# Patient Record
Sex: FEMALE | Race: WHITE | HISPANIC OR LATINO | URBAN - METROPOLITAN AREA
[De-identification: names, ages, dates, MRNs, and addresses within clinical notes are randomized per-mention and may not be internally consistent; named-entity substitution may affect disease eponyms.]

---

## 2017-01-22 ENCOUNTER — OUTPATIENT (OUTPATIENT)
Dept: OUTPATIENT SERVICES | Facility: HOSPITAL | Age: 57
LOS: 1 days | End: 2017-01-22
Payer: COMMERCIAL

## 2017-01-22 DIAGNOSIS — G47.33 OBSTRUCTIVE SLEEP APNEA (ADULT) (PEDIATRIC): ICD-10-CM

## 2017-01-22 PROCEDURE — 95811 POLYSOM 6/>YRS CPAP 4/> PARM: CPT | Mod: 26

## 2017-01-22 PROCEDURE — 95811 POLYSOM 6/>YRS CPAP 4/> PARM: CPT

## 2017-01-23 ENCOUNTER — APPOINTMENT (OUTPATIENT)
Dept: CARDIOLOGY | Facility: CLINIC | Age: 57
End: 2017-01-23

## 2017-07-03 ENCOUNTER — EMERGENCY (EMERGENCY)
Facility: HOSPITAL | Age: 57
LOS: 1 days | Discharge: HOME | End: 2017-07-03
Attending: EMERGENCY MEDICINE
Payer: COMMERCIAL

## 2017-07-03 VITALS
WEIGHT: 199.96 LBS | DIASTOLIC BLOOD PRESSURE: 92 MMHG | RESPIRATION RATE: 18 BRPM | OXYGEN SATURATION: 98 % | SYSTOLIC BLOOD PRESSURE: 142 MMHG | HEIGHT: 61 IN | HEART RATE: 83 BPM | TEMPERATURE: 98 F

## 2017-07-03 PROCEDURE — 29515 APPLICATION SHORT LEG SPLINT: CPT

## 2017-07-03 PROCEDURE — 99284 EMERGENCY DEPT VISIT MOD MDM: CPT | Mod: 25

## 2017-07-04 PROCEDURE — 73610 X-RAY EXAM OF ANKLE: CPT

## 2017-07-04 PROCEDURE — 99284 EMERGENCY DEPT VISIT MOD MDM: CPT | Mod: 25

## 2017-07-04 PROCEDURE — 93971 EXTREMITY STUDY: CPT

## 2017-07-04 PROCEDURE — 93971 EXTREMITY STUDY: CPT | Mod: 26,RT

## 2017-07-04 PROCEDURE — 73630 X-RAY EXAM OF FOOT: CPT | Mod: 26,RT

## 2017-07-04 PROCEDURE — 73630 X-RAY EXAM OF FOOT: CPT

## 2017-07-04 PROCEDURE — 73610 X-RAY EXAM OF ANKLE: CPT | Mod: 26,RT

## 2017-07-04 PROCEDURE — 29515 APPLICATION SHORT LEG SPLINT: CPT | Mod: RT

## 2017-07-04 RX ORDER — IBUPROFEN 200 MG
1 TABLET ORAL
Qty: 15 | Refills: 0
Start: 2017-07-04 | End: 2017-07-09

## 2017-07-04 RX ORDER — IBUPROFEN 200 MG
400 TABLET ORAL ONCE
Qty: 0 | Refills: 0 | Status: COMPLETED | OUTPATIENT
Start: 2017-07-04 | End: 2017-07-04

## 2017-07-04 RX ADMIN — Medication 400 MILLIGRAM(S): at 03:13

## 2017-07-04 NOTE — ED PROVIDER NOTE - MEDICAL DECISION MAKING DETAILS
pt is a 56yo female with right ankle pain and swelling without injury. will do x rays to r/o bony injury. will do US to r/o DVT. will give motrin for pain and re-evaluate.

## 2017-07-04 NOTE — ED PROCEDURE NOTE - CPROC ED POST PROC CARE GUIDE1
Keep the cast/splint/dressing clean and dry./Instructed patient/caregiver regarding signs and symptoms of infection./Elevate the injured extremity as instructed./Verbal/written post procedure instructions were given to patient/caregiver./Instructed patient/caregiver to follow-up with primary care physician.

## 2017-07-04 NOTE — ED PROVIDER NOTE - OBJECTIVE STATEMENT
pt is a 58yo female with PMhx of DM on metformin, HTN, sarcoidosis, psoriasis c/o R ankle pain x tonight. pt reports she woke up pt is a 56yo female with PMhx of DM on metformin, HTN, sarcoidosis, psoriasis c/o R ankle pain x tonight. pt reports she woke up tonight with right ankle pain. pt reports she took 200mg of motrin which provided minimal relief. pt reports she did not injury extremity. pt reports previous episode which was an ankle sprain. pt endorses car ride last week to [place]. ALLERGY: ASA BUT PT TAKES DAILY?

## 2017-07-04 NOTE — ED PROVIDER NOTE - PHYSICAL EXAMINATION
PV: + 2 pitting edema R ankle + 2 dp R LE. cap refill < 2 seconds. no calf pain. + varicosity lateral right calf.   MS: RIGHT ANKLE AND LATERAL FOOT TTP. FROM OF BL LE. SENSATION GROSSLY INTACT BL LE.

## 2017-07-04 NOTE — ED PROCEDURE NOTE - NS ED PERI VASCULAR NEG
no swelling/no paresthesia/no cyanosis of extremity/capillary refill time < 2 seconds/fingers/toes warm to touch

## 2017-07-04 NOTE — ED PROVIDER NOTE - ATTENDING CONTRIBUTION TO CARE
pt with ankle pain over lateral malleolus. pt has avulsion fx, plan splint, x-ray    I personally saw the patient with the PA, and completed the key components of the history and physical exam. I then discussed the management plan with the PA

## 2017-07-04 NOTE — ED PROVIDER NOTE - PROGRESS NOTE DETAILS
X RAY SHOWS LATERAL MALLEOLUS AVULSION FRACTURE, POSSIBLY OLD. WILL SPLINT AFTER US RESULTS. us reviewed. pt splinted and rx motrin since pt states she takes motrin. advised pt to follow up with ortho. pt has crutches with her. pt verbalized understanding and agreement with plan and dx will dc. X RAY SHOWS LATERAL MALLEOLUS AVULSION FRACTURE TO R LE, POSSIBLY OLD. WILL SPLINT AFTER US RESULTS.

## 2017-07-10 ENCOUNTER — APPOINTMENT (OUTPATIENT)
Dept: PULMONOLOGY | Facility: CLINIC | Age: 57
End: 2017-07-10

## 2017-07-24 ENCOUNTER — EMERGENCY (EMERGENCY)
Facility: HOSPITAL | Age: 57
LOS: 1 days | Discharge: HOME | End: 2017-07-24
Attending: EMERGENCY MEDICINE | Admitting: EMERGENCY MEDICINE
Payer: COMMERCIAL

## 2017-07-24 VITALS
SYSTOLIC BLOOD PRESSURE: 145 MMHG | HEART RATE: 85 BPM | HEIGHT: 61 IN | DIASTOLIC BLOOD PRESSURE: 88 MMHG | TEMPERATURE: 99 F | WEIGHT: 199.96 LBS | RESPIRATION RATE: 18 BRPM | OXYGEN SATURATION: 98 %

## 2017-07-24 PROCEDURE — 99284 EMERGENCY DEPT VISIT MOD MDM: CPT

## 2017-07-24 PROCEDURE — 73562 X-RAY EXAM OF KNEE 3: CPT

## 2017-07-24 PROCEDURE — 93971 EXTREMITY STUDY: CPT | Mod: 26,LT

## 2017-07-24 PROCEDURE — 93971 EXTREMITY STUDY: CPT

## 2017-07-24 RX ORDER — IBUPROFEN 200 MG
600 TABLET ORAL ONCE
Qty: 0 | Refills: 0 | Status: COMPLETED | OUTPATIENT
Start: 2017-07-24 | End: 2017-07-24

## 2017-07-24 RX ORDER — CYCLOBENZAPRINE HYDROCHLORIDE 10 MG/1
10 TABLET, FILM COATED ORAL ONCE
Qty: 0 | Refills: 0 | Status: COMPLETED | OUTPATIENT
Start: 2017-07-24 | End: 2017-07-24

## 2017-07-24 RX ADMIN — Medication 600 MILLIGRAM(S): at 23:58

## 2017-07-24 RX ADMIN — CYCLOBENZAPRINE HYDROCHLORIDE 10 MILLIGRAM(S): 10 TABLET, FILM COATED ORAL at 23:58

## 2017-07-24 NOTE — ED ADULT NURSE NOTE - OBJECTIVE STATEMENT
Assumed pt care @ 7715. Pt received sitting on stretcher in NAD. Pt AOx3 C/O 10/10 left knee pain no trauma or injury to knee deon some swelling. Pt presents with a cast to the right ankle due to a  fx from previous injury. Pt ambulates with crutches due to that injury tonight too much and pt in wheelchair at this time.  Neuro WNL. Skin warm, dry, color appropriate for age and race.

## 2017-07-25 PROCEDURE — 73562 X-RAY EXAM OF KNEE 3: CPT | Mod: 26,LT

## 2017-07-25 RX ORDER — CYCLOBENZAPRINE HYDROCHLORIDE 10 MG/1
1 TABLET, FILM COATED ORAL
Qty: 15 | Refills: 0
Start: 2017-07-25 | End: 2017-07-30

## 2017-07-25 NOTE — ED PROVIDER NOTE - PHYSICAL EXAMINATION
appears uncomfortable  mm moist  sitting comfortbale  lef leg and knee no swelling  no skin changes  distal sensory, motor rand capillary intact  neuro cn grossly inatct

## 2017-07-25 NOTE — ED PROVIDER NOTE - OBJECTIVE STATEMENT
57y old has a crutch and boot for right left, states since then has had gradual increasing pain to left knee, pain is sharp, achy, does not radiate, aggravtated with movement, did not take any thing for pain, no fall, no trauma, no skin rash

## 2017-08-04 ENCOUNTER — APPOINTMENT (OUTPATIENT)
Dept: PULMONOLOGY | Facility: CLINIC | Age: 57
End: 2017-08-04
Payer: MEDICAID

## 2017-08-04 VITALS
OXYGEN SATURATION: 98 % | BODY MASS INDEX: 36.4 KG/M2 | WEIGHT: 199 LBS | SYSTOLIC BLOOD PRESSURE: 110 MMHG | HEART RATE: 77 BPM | DIASTOLIC BLOOD PRESSURE: 70 MMHG

## 2017-08-04 PROCEDURE — 99214 OFFICE O/P EST MOD 30 MIN: CPT

## 2017-08-07 ENCOUNTER — APPOINTMENT (OUTPATIENT)
Dept: MAMMOGRAPHY | Facility: CLINIC | Age: 57
End: 2017-08-07

## 2017-08-29 ENCOUNTER — CHART COPY (OUTPATIENT)
Age: 57
End: 2017-08-29

## 2017-09-05 ENCOUNTER — CHART COPY (OUTPATIENT)
Age: 57
End: 2017-09-05

## 2017-09-07 ENCOUNTER — APPOINTMENT (OUTPATIENT)
Dept: PULMONOLOGY | Facility: CLINIC | Age: 57
End: 2017-09-07

## 2017-09-19 ENCOUNTER — CHART COPY (OUTPATIENT)
Age: 57
End: 2017-09-19

## 2017-09-26 ENCOUNTER — OUTPATIENT (OUTPATIENT)
Dept: OUTPATIENT SERVICES | Facility: HOSPITAL | Age: 57
LOS: 1 days | End: 2017-09-26
Payer: MEDICAID

## 2017-09-26 ENCOUNTER — APPOINTMENT (OUTPATIENT)
Dept: MAMMOGRAPHY | Facility: CLINIC | Age: 57
End: 2017-09-26
Payer: MEDICAID

## 2017-09-26 DIAGNOSIS — Z00.8 ENCOUNTER FOR OTHER GENERAL EXAMINATION: ICD-10-CM

## 2017-09-26 PROCEDURE — 77063 BREAST TOMOSYNTHESIS BI: CPT | Mod: 26

## 2017-09-26 PROCEDURE — G0202: CPT | Mod: 26

## 2017-09-26 PROCEDURE — 77067 SCR MAMMO BI INCL CAD: CPT

## 2017-09-26 PROCEDURE — 77063 BREAST TOMOSYNTHESIS BI: CPT

## 2017-09-29 ENCOUNTER — CHART COPY (OUTPATIENT)
Age: 57
End: 2017-09-29

## 2017-10-25 ENCOUNTER — APPOINTMENT (OUTPATIENT)
Dept: RHEUMATOLOGY | Facility: CLINIC | Age: 57
End: 2017-10-25
Payer: MEDICAID

## 2017-10-25 VITALS
DIASTOLIC BLOOD PRESSURE: 75 MMHG | OXYGEN SATURATION: 96 % | BODY MASS INDEX: 35.88 KG/M2 | HEIGHT: 62 IN | HEART RATE: 86 BPM | TEMPERATURE: 98 F | SYSTOLIC BLOOD PRESSURE: 108 MMHG | RESPIRATION RATE: 17 BRPM | WEIGHT: 195 LBS

## 2017-10-25 PROCEDURE — 99205 OFFICE O/P NEW HI 60 MIN: CPT | Mod: 25

## 2017-10-25 PROCEDURE — 36415 COLL VENOUS BLD VENIPUNCTURE: CPT

## 2017-11-03 ENCOUNTER — APPOINTMENT (OUTPATIENT)
Dept: PULMONOLOGY | Facility: CLINIC | Age: 57
End: 2017-11-03
Payer: MEDICAID

## 2017-11-03 VITALS — HEART RATE: 82 BPM | DIASTOLIC BLOOD PRESSURE: 80 MMHG | SYSTOLIC BLOOD PRESSURE: 120 MMHG | OXYGEN SATURATION: 98 %

## 2017-11-03 VITALS — WEIGHT: 188 LBS | BODY MASS INDEX: 34.39 KG/M2

## 2017-11-03 PROCEDURE — 99214 OFFICE O/P EST MOD 30 MIN: CPT | Mod: 25

## 2017-11-03 PROCEDURE — 94010 BREATHING CAPACITY TEST: CPT

## 2017-12-28 LAB
ALBUMIN SERPL ELPH-MCNC: 4.3 G/DL
ALP BLD-CCNC: 38 U/L
ALT SERPL-CCNC: 18 U/L
ANION GAP SERPL CALC-SCNC: 15 MMOL/L
AST SERPL-CCNC: 20 U/L
BASOPHILS # BLD AUTO: 0.02 K/UL
BASOPHILS NFR BLD AUTO: 0.6 %
BILIRUB SERPL-MCNC: 0.4 MG/DL
BUN SERPL-MCNC: 19 MG/DL
CALCIUM SERPL-MCNC: 10.3 MG/DL
CHLORIDE SERPL-SCNC: 99 MMOL/L
CO2 SERPL-SCNC: 27 MMOL/L
CREAT SERPL-MCNC: 0.84 MG/DL
CRP SERPL-MCNC: 1.9 MG/DL
EOSINOPHIL # BLD AUTO: 0.17 K/UL
EOSINOPHIL NFR BLD AUTO: 5.2 %
ERYTHROCYTE [SEDIMENTATION RATE] IN BLOOD BY WESTERGREN METHOD: 37 MM/HR
GLUCOSE SERPL-MCNC: 93 MG/DL
HCT VFR BLD CALC: 35.8 %
HGB BLD-MCNC: 11.6 G/DL
IMM GRANULOCYTES NFR BLD AUTO: 0 %
LYMPHOCYTES # BLD AUTO: 0.81 K/UL
LYMPHOCYTES NFR BLD AUTO: 24.8 %
MAN DIFF?: NORMAL
MCHC RBC-ENTMCNC: 26.4 PG
MCHC RBC-ENTMCNC: 32.4 GM/DL
MCV RBC AUTO: 81.4 FL
MONOCYTES # BLD AUTO: 0.38 K/UL
MONOCYTES NFR BLD AUTO: 11.7 %
NEUTROPHILS # BLD AUTO: 1.88 K/UL
NEUTROPHILS NFR BLD AUTO: 57.7 %
PLATELET # BLD AUTO: 225 K/UL
POTASSIUM SERPL-SCNC: 4.5 MMOL/L
PROT SERPL-MCNC: 8 G/DL
RBC # BLD: 4.4 M/UL
RBC # FLD: 15.8 %
SODIUM SERPL-SCNC: 141 MMOL/L
URATE SERPL-MCNC: 11 MG/DL
WBC # FLD AUTO: 3.26 K/UL

## 2018-05-07 ENCOUNTER — APPOINTMENT (OUTPATIENT)
Dept: PULMONOLOGY | Facility: CLINIC | Age: 58
End: 2018-05-07
Payer: MEDICAID

## 2018-05-07 VITALS
WEIGHT: 193 LBS | DIASTOLIC BLOOD PRESSURE: 72 MMHG | HEART RATE: 82 BPM | OXYGEN SATURATION: 96 % | SYSTOLIC BLOOD PRESSURE: 116 MMHG | BODY MASS INDEX: 35.51 KG/M2 | HEIGHT: 62 IN

## 2018-05-07 PROCEDURE — 99214 OFFICE O/P EST MOD 30 MIN: CPT

## 2018-05-16 ENCOUNTER — APPOINTMENT (OUTPATIENT)
Dept: RHEUMATOLOGY | Facility: CLINIC | Age: 58
End: 2018-05-16
Payer: MEDICAID

## 2018-05-16 VITALS
HEART RATE: 81 BPM | RESPIRATION RATE: 18 BRPM | HEIGHT: 62 IN | DIASTOLIC BLOOD PRESSURE: 80 MMHG | WEIGHT: 193 LBS | TEMPERATURE: 98.3 F | BODY MASS INDEX: 35.51 KG/M2 | SYSTOLIC BLOOD PRESSURE: 122 MMHG | OXYGEN SATURATION: 97 %

## 2018-05-16 PROCEDURE — 99214 OFFICE O/P EST MOD 30 MIN: CPT | Mod: 25

## 2018-05-16 PROCEDURE — 36415 COLL VENOUS BLD VENIPUNCTURE: CPT

## 2018-05-29 ENCOUNTER — APPOINTMENT (OUTPATIENT)
Dept: NEPHROLOGY | Facility: CLINIC | Age: 58
End: 2018-05-29
Payer: MEDICAID

## 2018-05-29 VITALS
HEIGHT: 62 IN | SYSTOLIC BLOOD PRESSURE: 130 MMHG | BODY MASS INDEX: 34.41 KG/M2 | WEIGHT: 187 LBS | DIASTOLIC BLOOD PRESSURE: 70 MMHG | HEART RATE: 95 BPM

## 2018-05-29 PROCEDURE — 99205 OFFICE O/P NEW HI 60 MIN: CPT | Mod: 25

## 2018-05-29 PROCEDURE — 36415 COLL VENOUS BLD VENIPUNCTURE: CPT

## 2018-05-30 LAB
ALBUMIN SERPL ELPH-MCNC: 4.5 G/DL
ANION GAP SERPL CALC-SCNC: 16 MMOL/L
APPEARANCE: CLEAR
BACTERIA: ABNORMAL
BILIRUBIN URINE: NEGATIVE
BLOOD URINE: NEGATIVE
BUN SERPL-MCNC: 24 MG/DL
CALCIUM OXALATE CRYSTALS: ABNORMAL
CALCIUM SERPL-MCNC: 11.3 MG/DL
CHLORIDE SERPL-SCNC: 100 MMOL/L
CO2 SERPL-SCNC: 27 MMOL/L
COLOR: YELLOW
CREAT SERPL-MCNC: 1.09 MG/DL
CREAT SPEC-SCNC: 80 MG/DL
CREAT SPEC-SCNC: 80 MG/DL
CREAT/PROT UR: 0.1 RATIO
GLUCOSE QUALITATIVE U: NEGATIVE MG/DL
GLUCOSE SERPL-MCNC: 87 MG/DL
HYALINE CASTS: 3 /LPF
KETONES URINE: NEGATIVE
LEUKOCYTE ESTERASE URINE: ABNORMAL
MICROALBUMIN 24H UR DL<=1MG/L-MCNC: 0.9 MG/DL
MICROALBUMIN/CREAT 24H UR-RTO: 11 MG/G
MICROSCOPIC-UA: NORMAL
NITRITE URINE: NEGATIVE
PH URINE: 5.5
PHOSPHATE SERPL-MCNC: 3.9 MG/DL
POTASSIUM SERPL-SCNC: 4.7 MMOL/L
PROT UR-MCNC: 10 MG/DL
PROTEIN URINE: NEGATIVE MG/DL
RED BLOOD CELLS URINE: 2 /HPF
SODIUM SERPL-SCNC: 143 MMOL/L
SPECIFIC GRAVITY URINE: 1.01
SQUAMOUS EPITHELIAL CELLS: 6 /HPF
UROBILINOGEN URINE: NEGATIVE MG/DL
WHITE BLOOD CELLS URINE: 4 /HPF

## 2018-05-31 LAB
BASOPHILS # BLD AUTO: 0.02 K/UL
BASOPHILS NFR BLD AUTO: 0.7 %
C3 SERPL-MCNC: 192 MG/DL
C4 SERPL-MCNC: 43 MG/DL
DSDNA AB SER-ACNC: <12 IU/ML
EOSINOPHIL # BLD AUTO: 0.14 K/UL
EOSINOPHIL NFR BLD AUTO: 4.8 %
ESTIMATED AVERAGE GLUCOSE: 126 MG/DL
HBA1C MFR BLD HPLC: 6 %
HCT VFR BLD CALC: 38.7 %
HGB BLD-MCNC: 12 G/DL
IMM GRANULOCYTES NFR BLD AUTO: 0 %
LYMPHOCYTES # BLD AUTO: 0.68 K/UL
LYMPHOCYTES NFR BLD AUTO: 23.4 %
MAN DIFF?: NORMAL
MCHC RBC-ENTMCNC: 26.2 PG
MCHC RBC-ENTMCNC: 31 GM/DL
MCV RBC AUTO: 84.5 FL
MONOCYTES # BLD AUTO: 0.32 K/UL
MONOCYTES NFR BLD AUTO: 11 %
NEUTROPHILS # BLD AUTO: 1.75 K/UL
NEUTROPHILS NFR BLD AUTO: 60.1 %
PLATELET # BLD AUTO: 257 K/UL
RBC # BLD: 4.58 M/UL
RBC # FLD: 15.8 %
WBC # FLD AUTO: 2.91 K/UL

## 2018-06-11 ENCOUNTER — FORM ENCOUNTER (OUTPATIENT)
Age: 58
End: 2018-06-11

## 2018-06-12 ENCOUNTER — OUTPATIENT (OUTPATIENT)
Dept: OUTPATIENT SERVICES | Facility: HOSPITAL | Age: 58
LOS: 1 days | End: 2018-06-12
Payer: MEDICAID

## 2018-06-12 ENCOUNTER — APPOINTMENT (OUTPATIENT)
Dept: ULTRASOUND IMAGING | Facility: CLINIC | Age: 58
End: 2018-06-12
Payer: MEDICAID

## 2018-06-12 DIAGNOSIS — N18.3 CHRONIC KIDNEY DISEASE, STAGE 3 (MODERATE): ICD-10-CM

## 2018-06-12 PROCEDURE — 76775 US EXAM ABDO BACK WALL LIM: CPT | Mod: 26

## 2018-06-12 PROCEDURE — 76775 US EXAM ABDO BACK WALL LIM: CPT

## 2018-06-20 ENCOUNTER — APPOINTMENT (OUTPATIENT)
Dept: RHEUMATOLOGY | Facility: CLINIC | Age: 58
End: 2018-06-20

## 2018-06-27 ENCOUNTER — APPOINTMENT (OUTPATIENT)
Dept: NEPHROLOGY | Facility: CLINIC | Age: 58
End: 2018-06-27
Payer: MEDICAID

## 2018-06-27 VITALS
HEIGHT: 62 IN | SYSTOLIC BLOOD PRESSURE: 160 MMHG | DIASTOLIC BLOOD PRESSURE: 90 MMHG | HEART RATE: 96 BPM | WEIGHT: 185.25 LBS | BODY MASS INDEX: 34.09 KG/M2

## 2018-06-27 VITALS — SYSTOLIC BLOOD PRESSURE: 132 MMHG | DIASTOLIC BLOOD PRESSURE: 80 MMHG

## 2018-06-27 PROCEDURE — 99215 OFFICE O/P EST HI 40 MIN: CPT

## 2018-07-24 ENCOUNTER — RESULT REVIEW (OUTPATIENT)
Age: 58
End: 2018-07-24

## 2018-07-24 LAB
24R-OH-CALCIDIOL SERPL-MCNC: 83.3 PG/ML
25(OH)D3 SERPL-MCNC: 11 NG/ML
ACE BLD-CCNC: 23 U/L
ALBUMIN SERPL ELPH-MCNC: 4.2 G/DL
ANION GAP SERPL CALC-SCNC: 16 MMOL/L
BUN SERPL-MCNC: 35 MG/DL
CA-I SERPL-SCNC: 1.49 MMOL/L
CALCIUM SERPL-MCNC: 11.4 MG/DL
CALCIUM SERPL-MCNC: 11.4 MG/DL
CHLORIDE SERPL-SCNC: 99 MMOL/L
CO2 SERPL-SCNC: 25 MMOL/L
CREAT SERPL-MCNC: 1.31 MG/DL
DEPRECATED KAPPA LC FREE/LAMBDA SER: 2.19 RATIO
GLUCOSE SERPL-MCNC: 88 MG/DL
KAPPA LC CSF-MCNC: 5.34 MG/DL
KAPPA LC SERPL-MCNC: 11.69 MG/DL
M PROTEIN SPEC IFE-MCNC: NORMAL
PARATHYROID HORMONE INTACT: 9 PG/ML
PHOSPHATE SERPL-MCNC: 3.9 MG/DL
POTASSIUM SERPL-SCNC: 4.9 MMOL/L
SODIUM SERPL-SCNC: 140 MMOL/L

## 2018-07-26 PROBLEM — I10 ESSENTIAL (PRIMARY) HYPERTENSION: Chronic | Status: ACTIVE | Noted: 2017-07-04

## 2018-07-26 PROBLEM — D86.9 SARCOIDOSIS, UNSPECIFIED: Chronic | Status: ACTIVE | Noted: 2017-07-04

## 2018-07-26 PROBLEM — E11.9 TYPE 2 DIABETES MELLITUS WITHOUT COMPLICATIONS: Chronic | Status: ACTIVE | Noted: 2017-07-04

## 2018-07-31 ENCOUNTER — APPOINTMENT (OUTPATIENT)
Dept: NEPHROLOGY | Facility: CLINIC | Age: 58
End: 2018-07-31
Payer: MEDICAID

## 2018-07-31 VITALS
BODY MASS INDEX: 32.39 KG/M2 | DIASTOLIC BLOOD PRESSURE: 70 MMHG | SYSTOLIC BLOOD PRESSURE: 118 MMHG | WEIGHT: 176 LBS | HEIGHT: 62 IN | HEART RATE: 78 BPM | OXYGEN SATURATION: 98 %

## 2018-07-31 PROCEDURE — 36415 COLL VENOUS BLD VENIPUNCTURE: CPT

## 2018-07-31 PROCEDURE — 99215 OFFICE O/P EST HI 40 MIN: CPT | Mod: 25

## 2018-08-02 LAB
24R-OH-CALCIDIOL SERPL-MCNC: 81.4 PG/ML
25(OH)D3 SERPL-MCNC: 11 NG/ML
ALBUMIN SERPL ELPH-MCNC: 4.3 G/DL
ANION GAP SERPL CALC-SCNC: 18 MMOL/L
BUN SERPL-MCNC: 35 MG/DL
CALCIUM SERPL-MCNC: 11.7 MG/DL
CALCIUM SERPL-MCNC: 11.7 MG/DL
CHLORIDE SERPL-SCNC: 99 MMOL/L
CO2 SERPL-SCNC: 22 MMOL/L
CREAT SERPL-MCNC: 1.25 MG/DL
GLUCOSE SERPL-MCNC: 84 MG/DL
PARATHYROID HORMONE INTACT: 8 PG/ML
PHOSPHATE SERPL-MCNC: 3.6 MG/DL
POTASSIUM SERPL-SCNC: 4.8 MMOL/L
SODIUM SERPL-SCNC: 140 MMOL/L

## 2018-08-07 LAB — PTH RELATED PROT SERPL-MCNC: <2 PMOL/L

## 2018-08-28 ENCOUNTER — APPOINTMENT (OUTPATIENT)
Dept: NEPHROLOGY | Facility: CLINIC | Age: 58
End: 2018-08-28
Payer: MEDICAID

## 2018-08-28 VITALS
HEIGHT: 62 IN | WEIGHT: 180 LBS | SYSTOLIC BLOOD PRESSURE: 128 MMHG | BODY MASS INDEX: 33.13 KG/M2 | HEART RATE: 72 BPM | DIASTOLIC BLOOD PRESSURE: 80 MMHG

## 2018-08-28 PROCEDURE — 99215 OFFICE O/P EST HI 40 MIN: CPT

## 2018-08-29 LAB
ALBUMIN SERPL ELPH-MCNC: 4.2 G/DL
ANION GAP SERPL CALC-SCNC: 14 MMOL/L
BUN SERPL-MCNC: 36 MG/DL
CA-I SERPL-SCNC: 1.34 MMOL/L
CALCIUM SERPL-MCNC: 10.2 MG/DL
CHLORIDE SERPL-SCNC: 100 MMOL/L
CO2 SERPL-SCNC: 25 MMOL/L
CREAT SERPL-MCNC: 1.12 MG/DL
GLUCOSE SERPL-MCNC: 78 MG/DL
PHOSPHATE SERPL-MCNC: 3.8 MG/DL
POTASSIUM SERPL-SCNC: 4.9 MMOL/L
SODIUM SERPL-SCNC: 139 MMOL/L

## 2018-09-01 ENCOUNTER — OUTPATIENT (OUTPATIENT)
Dept: OUTPATIENT SERVICES | Facility: HOSPITAL | Age: 58
LOS: 1 days | End: 2018-09-01
Payer: MEDICAID

## 2018-09-01 PROCEDURE — G9001: CPT

## 2018-09-14 ENCOUNTER — APPOINTMENT (OUTPATIENT)
Dept: ORTHOPEDIC SURGERY | Facility: CLINIC | Age: 58
End: 2018-09-14
Payer: MEDICAID

## 2018-09-14 VITALS
WEIGHT: 185 LBS | DIASTOLIC BLOOD PRESSURE: 78 MMHG | SYSTOLIC BLOOD PRESSURE: 125 MMHG | HEART RATE: 78 BPM | HEIGHT: 62 IN | BODY MASS INDEX: 34.04 KG/M2

## 2018-09-14 DIAGNOSIS — M79.671 PAIN IN RIGHT LEG: ICD-10-CM

## 2018-09-14 DIAGNOSIS — M25.561 PAIN IN RIGHT KNEE: ICD-10-CM

## 2018-09-14 DIAGNOSIS — M79.604 PAIN IN RIGHT LEG: ICD-10-CM

## 2018-09-14 DIAGNOSIS — M79.672 PAIN IN RIGHT LEG: ICD-10-CM

## 2018-09-14 DIAGNOSIS — M79.605 PAIN IN RIGHT LEG: ICD-10-CM

## 2018-09-14 PROCEDURE — 99214 OFFICE O/P EST MOD 30 MIN: CPT

## 2018-09-14 PROCEDURE — 73630 X-RAY EXAM OF FOOT: CPT | Mod: RT

## 2018-09-18 ENCOUNTER — APPOINTMENT (OUTPATIENT)
Dept: PULMONOLOGY | Facility: CLINIC | Age: 58
End: 2018-09-18
Payer: MEDICAID

## 2018-09-18 VITALS
HEART RATE: 90 BPM | SYSTOLIC BLOOD PRESSURE: 122 MMHG | RESPIRATION RATE: 16 BRPM | OXYGEN SATURATION: 96 % | WEIGHT: 181 LBS | DIASTOLIC BLOOD PRESSURE: 84 MMHG | BODY MASS INDEX: 33.11 KG/M2

## 2018-09-18 PROCEDURE — 85018 HEMOGLOBIN: CPT | Mod: QW

## 2018-09-18 PROCEDURE — 94727 GAS DIL/WSHOT DETER LNG VOL: CPT

## 2018-09-18 PROCEDURE — 94729 DIFFUSING CAPACITY: CPT

## 2018-09-18 PROCEDURE — 99214 OFFICE O/P EST MOD 30 MIN: CPT | Mod: 25

## 2018-09-18 PROCEDURE — 94010 BREATHING CAPACITY TEST: CPT

## 2018-09-18 RX ORDER — PREDNISONE 10 MG/1
10 TABLET ORAL
Qty: 90 | Refills: 0 | Status: DISCONTINUED | COMMUNITY
Start: 2018-08-02 | End: 2018-09-18

## 2018-09-24 ENCOUNTER — EMERGENCY (EMERGENCY)
Facility: HOSPITAL | Age: 58
LOS: 1 days | Discharge: HOME | End: 2018-09-24
Attending: EMERGENCY MEDICINE
Payer: COMMERCIAL

## 2018-09-24 VITALS
RESPIRATION RATE: 18 BRPM | HEART RATE: 97 BPM | DIASTOLIC BLOOD PRESSURE: 81 MMHG | OXYGEN SATURATION: 99 % | SYSTOLIC BLOOD PRESSURE: 143 MMHG | TEMPERATURE: 99 F

## 2018-09-24 VITALS — HEIGHT: 66 IN | WEIGHT: 179.9 LBS

## 2018-09-24 PROCEDURE — 99283 EMERGENCY DEPT VISIT LOW MDM: CPT

## 2018-09-24 PROCEDURE — 73562 X-RAY EXAM OF KNEE 3: CPT

## 2018-09-24 RX ORDER — ACETAMINOPHEN 500 MG
650 TABLET ORAL ONCE
Qty: 0 | Refills: 0 | Status: COMPLETED | OUTPATIENT
Start: 2018-09-24 | End: 2018-09-24

## 2018-09-24 RX ADMIN — Medication 60 MILLIGRAM(S): at 17:38

## 2018-09-24 RX ADMIN — Medication 650 MILLIGRAM(S): at 16:23

## 2018-09-24 NOTE — ED STATDOCS - OBJECTIVE STATEMENT
57 y/o with hx of DM, gout, arthritis presents to ED c/o left knee pain radiating to foot x 1 week, worsening last night. Pt went to orthopedic doctor before pain began and was told she may have neuropathy. Pt had similar issue in the past in right knee and she had fluid taken out of knee. She is using crutches due to knee pain. Pt denies trauma, falls, injury, increased walking. Denies fever. Pt has been taking Tylenol for pain, last dose was last night. She was advised by nephrologist to not take ibuprofen due to kidney issues.  Nephrologist: Dr. Cheng 57 y/o with hx of DM, HTN, gout, arthritis presents to ED c/o left knee pain radiating to foot x 1 week, worsening last night. Pt went to orthopedic doctor before pain began and was told she may have neuropathy. Pt had similar issue in the past in right knee and she had fluid taken out of knee. She is using crutches due to knee pain. Pt denies trauma, falls, injury, increased walking. Denies fever. Pt has been taking Tylenol for pain, last dose was last night. She was advised by nephrologist to not take ibuprofen due to kidney issues.  Nephrologist: Dr. Cheng

## 2018-09-24 NOTE — ED STATDOCS - PROGRESS NOTE DETAILS
knee exam- left - laterally tender and warm, no medial tenderness. xray neg, discussed with dr shana kelsey ortho consult, will treat with steroid and ortho fu. pt will return if fever increased swelling increased pain or erythema

## 2018-09-24 NOTE — ED ADULT NURSE NOTE - NSIMPLEMENTINTERV_GEN_ALL_ED
Implemented All Universal Safety Interventions:  [place] to call system. Call bell, personal items and telephone within reach. Instruct patient to call for assistance. Room bathroom lighting operational. Non-slip footwear when patient is off stretcher. Physically safe environment: no spills, clutter or unnecessary equipment. Stretcher in lowest position, wheels locked, appropriate side rails in place.

## 2018-09-24 NOTE — ED STATDOCS - MEDICAL DECISION MAKING DETAILS
Gout vs arthritis vs septic arthritis. Get x-ray, pain control Gout vs arthritis vs septic arthritis. Get x-ray, pain control, ortho consult

## 2018-09-24 NOTE — ED ADULT NURSE NOTE - OBJECTIVE STATEMENT
Assumed care at 1555.  A+OX4, c/o L knee pain radiating to foot.  Hx; of arthritis and gout.  ambulating with crutches.  L knee appears swollen and is warm to touch.  Denies any other complaints at this time

## 2018-09-24 NOTE — ED STATDOCS - ATTENDING CONTRIBUTION TO CARE
I, Dr. Mcadams, performed the initial face to face bedside interview with this patient regarding history of present illness, review of symptoms and relevant past medical, social and family history.  I completed an independent physical examination.  I was the initial provider who evaluated this patient. I have signed out the follow up of any pending tests (i.e. labs, radiological studies) to the ACP.  I have communicated the patient’s plan of care and disposition with the ACP.

## 2018-09-24 NOTE — ED STATDOCS - MUSCULOSKELETAL, MLM
warmth and tenderness to left knee compared to right, ROM is limited due to pain. tenderness to lateral aspect of left knee with swelling

## 2018-09-25 DIAGNOSIS — Z71.89 OTHER SPECIFIED COUNSELING: ICD-10-CM

## 2018-09-26 PROCEDURE — 73562 X-RAY EXAM OF KNEE 3: CPT | Mod: 26,LT

## 2018-10-02 ENCOUNTER — LABORATORY RESULT (OUTPATIENT)
Age: 58
End: 2018-10-02

## 2018-10-02 ENCOUNTER — APPOINTMENT (OUTPATIENT)
Dept: NEPHROLOGY | Facility: CLINIC | Age: 58
End: 2018-10-02
Payer: MEDICAID

## 2018-10-02 VITALS
WEIGHT: 182 LBS | HEART RATE: 77 BPM | SYSTOLIC BLOOD PRESSURE: 130 MMHG | OXYGEN SATURATION: 97 % | HEIGHT: 62 IN | DIASTOLIC BLOOD PRESSURE: 68 MMHG | BODY MASS INDEX: 33.49 KG/M2

## 2018-10-02 PROCEDURE — 99215 OFFICE O/P EST HI 40 MIN: CPT

## 2018-10-04 LAB
ALBUMIN SERPL ELPH-MCNC: 4.1 G/DL
ANION GAP SERPL CALC-SCNC: 13 MMOL/L
APPEARANCE: CLEAR
BACTERIA: NEGATIVE
BASOPHILS # BLD AUTO: 0.01 K/UL
BASOPHILS NFR BLD AUTO: 0.2 %
BILIRUBIN URINE: NEGATIVE
BLOOD URINE: NEGATIVE
BUN SERPL-MCNC: 32 MG/DL
CALCIUM SERPL-MCNC: 10.1 MG/DL
CHLORIDE SERPL-SCNC: 103 MMOL/L
CO2 SERPL-SCNC: 28 MMOL/L
COLOR: YELLOW
CREAT SERPL-MCNC: 1.1 MG/DL
CREAT SPEC-SCNC: 53 MG/DL
CREAT/PROT UR: 0.1 RATIO
DEPRECATED KAPPA LC FREE/LAMBDA SER: 1.4 RATIO
EOSINOPHIL # BLD AUTO: 0.17 K/UL
EOSINOPHIL NFR BLD AUTO: 3.4 %
GLUCOSE QUALITATIVE U: NEGATIVE MG/DL
GLUCOSE SERPL-MCNC: 85 MG/DL
HCT VFR BLD CALC: 36.1 %
HGB BLD-MCNC: 11.2 G/DL
HYALINE CASTS: 1 /LPF
IMM GRANULOCYTES NFR BLD AUTO: 2.2 %
KAPPA LC CSF-MCNC: 2.29 MG/DL
KAPPA LC SERPL-MCNC: 3.2 MG/DL
KETONES URINE: NEGATIVE
LEUKOCYTE ESTERASE URINE: NEGATIVE
LYMPHOCYTES # BLD AUTO: 0.97 K/UL
LYMPHOCYTES NFR BLD AUTO: 19.2 %
M PROTEIN SPEC IFE-MCNC: NORMAL
MAN DIFF?: NORMAL
MCHC RBC-ENTMCNC: 26.2 PG
MCHC RBC-ENTMCNC: 31 GM/DL
MCV RBC AUTO: 84.3 FL
MICROSCOPIC-UA: NORMAL
MONOCYTES # BLD AUTO: 0.47 K/UL
MONOCYTES NFR BLD AUTO: 9.3 %
NEUTROPHILS # BLD AUTO: 3.33 K/UL
NEUTROPHILS NFR BLD AUTO: 65.7 %
NITRITE URINE: NEGATIVE
PH URINE: 6.5
PHOSPHATE SERPL-MCNC: 3.5 MG/DL
PLATELET # BLD AUTO: 310 K/UL
POTASSIUM SERPL-SCNC: 4.7 MMOL/L
PROT UR-MCNC: 4 MG/DL
PROTEIN URINE: NEGATIVE MG/DL
RBC # BLD: 4.28 M/UL
RBC # FLD: 16.3 %
RED BLOOD CELLS URINE: 3 /HPF
SODIUM SERPL-SCNC: 144 MMOL/L
SPECIFIC GRAVITY URINE: 1.01
SQUAMOUS EPITHELIAL CELLS: 2 /HPF
UROBILINOGEN URINE: NEGATIVE MG/DL
WBC # FLD AUTO: 5.06 K/UL
WHITE BLOOD CELLS URINE: 2 /HPF

## 2018-10-16 ENCOUNTER — APPOINTMENT (OUTPATIENT)
Dept: ORTHOPEDIC SURGERY | Facility: CLINIC | Age: 58
End: 2018-10-16
Payer: MEDICAID

## 2018-10-16 ENCOUNTER — OUTPATIENT (OUTPATIENT)
Dept: OUTPATIENT SERVICES | Facility: HOSPITAL | Age: 58
LOS: 1 days | End: 2018-10-16
Payer: MEDICAID

## 2018-10-16 ENCOUNTER — APPOINTMENT (OUTPATIENT)
Dept: ULTRASOUND IMAGING | Facility: CLINIC | Age: 58
End: 2018-10-16
Payer: MEDICAID

## 2018-10-16 ENCOUNTER — APPOINTMENT (OUTPATIENT)
Dept: MAMMOGRAPHY | Facility: CLINIC | Age: 58
End: 2018-10-16
Payer: MEDICAID

## 2018-10-16 ENCOUNTER — OTHER (OUTPATIENT)
Age: 58
End: 2018-10-16

## 2018-10-16 VITALS
BODY MASS INDEX: 33.49 KG/M2 | SYSTOLIC BLOOD PRESSURE: 107 MMHG | HEIGHT: 62 IN | DIASTOLIC BLOOD PRESSURE: 70 MMHG | WEIGHT: 182 LBS | HEART RATE: 106 BPM

## 2018-10-16 DIAGNOSIS — Z00.00 ENCOUNTER FOR GENERAL ADULT MEDICAL EXAMINATION WITHOUT ABNORMAL FINDINGS: ICD-10-CM

## 2018-10-16 PROCEDURE — 20610 DRAIN/INJ JOINT/BURSA W/O US: CPT | Mod: LT

## 2018-10-16 PROCEDURE — G0279: CPT | Mod: 26

## 2018-10-16 PROCEDURE — 73564 X-RAY EXAM KNEE 4 OR MORE: CPT | Mod: LT

## 2018-10-16 PROCEDURE — G0279: CPT

## 2018-10-16 PROCEDURE — 77066 DX MAMMO INCL CAD BI: CPT

## 2018-10-16 PROCEDURE — 76641 ULTRASOUND BREAST COMPLETE: CPT | Mod: 26,50

## 2018-10-16 PROCEDURE — 99214 OFFICE O/P EST MOD 30 MIN: CPT | Mod: 25

## 2018-10-16 PROCEDURE — 76641 ULTRASOUND BREAST COMPLETE: CPT

## 2018-10-16 PROCEDURE — 77066 DX MAMMO INCL CAD BI: CPT | Mod: 26

## 2018-10-25 ENCOUNTER — APPOINTMENT (OUTPATIENT)
Dept: RHEUMATOLOGY | Facility: CLINIC | Age: 58
End: 2018-10-25
Payer: MEDICAID

## 2018-10-25 VITALS
OXYGEN SATURATION: 98 % | HEART RATE: 79 BPM | RESPIRATION RATE: 20 BRPM | SYSTOLIC BLOOD PRESSURE: 160 MMHG | DIASTOLIC BLOOD PRESSURE: 80 MMHG | TEMPERATURE: 97.7 F

## 2018-10-25 PROCEDURE — 36415 COLL VENOUS BLD VENIPUNCTURE: CPT

## 2018-10-25 PROCEDURE — 99215 OFFICE O/P EST HI 40 MIN: CPT | Mod: 25

## 2018-10-26 ENCOUNTER — RX CHANGE (OUTPATIENT)
Age: 58
End: 2018-10-26

## 2018-10-26 RX ORDER — FEBUXOSTAT 40 MG/1
40 TABLET ORAL DAILY
Qty: 30 | Refills: 3 | Status: DISCONTINUED | COMMUNITY
Start: 2018-10-25 | End: 2018-10-26

## 2018-11-23 ENCOUNTER — EMERGENCY (EMERGENCY)
Facility: HOSPITAL | Age: 58
LOS: 1 days | Discharge: HOME | End: 2018-11-23
Attending: EMERGENCY MEDICINE
Payer: COMMERCIAL

## 2018-11-23 VITALS
SYSTOLIC BLOOD PRESSURE: 120 MMHG | TEMPERATURE: 97 F | DIASTOLIC BLOOD PRESSURE: 74 MMHG | HEART RATE: 75 BPM | RESPIRATION RATE: 19 BRPM | OXYGEN SATURATION: 98 %

## 2018-11-23 VITALS
HEIGHT: 63 IN | HEART RATE: 98 BPM | OXYGEN SATURATION: 98 % | RESPIRATION RATE: 20 BRPM | TEMPERATURE: 98 F | DIASTOLIC BLOOD PRESSURE: 79 MMHG | WEIGHT: 184.97 LBS | SYSTOLIC BLOOD PRESSURE: 126 MMHG

## 2018-11-23 LAB
ALBUMIN SERPL ELPH-MCNC: 4 G/DL — SIGNIFICANT CHANGE UP (ref 3.3–5.2)
ALP SERPL-CCNC: 45 U/L — SIGNIFICANT CHANGE UP (ref 40–120)
ALT FLD-CCNC: 12 U/L — SIGNIFICANT CHANGE UP
ANION GAP SERPL CALC-SCNC: 15 MMOL/L — SIGNIFICANT CHANGE UP (ref 5–17)
APPEARANCE UR: CLEAR — SIGNIFICANT CHANGE UP
APTT BLD: 37.4 SEC — HIGH (ref 27.5–36.3)
AST SERPL-CCNC: 15 U/L — SIGNIFICANT CHANGE UP
BASOPHILS # BLD AUTO: 0 K/UL — SIGNIFICANT CHANGE UP (ref 0–0.2)
BASOPHILS NFR BLD AUTO: 0.3 % — SIGNIFICANT CHANGE UP (ref 0–2)
BILIRUB SERPL-MCNC: 0.5 MG/DL — SIGNIFICANT CHANGE UP (ref 0.4–2)
BILIRUB UR-MCNC: NEGATIVE — SIGNIFICANT CHANGE UP
BUN SERPL-MCNC: 24 MG/DL — HIGH (ref 8–20)
CALCIUM SERPL-MCNC: 10.3 MG/DL — HIGH (ref 8.6–10.2)
CHLORIDE SERPL-SCNC: 97 MMOL/L — LOW (ref 98–107)
CO2 SERPL-SCNC: 26 MMOL/L — SIGNIFICANT CHANGE UP (ref 22–29)
COD CRY URNS QL: ABNORMAL
COLOR SPEC: YELLOW — SIGNIFICANT CHANGE UP
CREAT SERPL-MCNC: 1.16 MG/DL — SIGNIFICANT CHANGE UP (ref 0.5–1.3)
DIFF PNL FLD: ABNORMAL
EOSINOPHIL # BLD AUTO: 0.1 K/UL — SIGNIFICANT CHANGE UP (ref 0–0.5)
EOSINOPHIL NFR BLD AUTO: 0.8 % — SIGNIFICANT CHANGE UP (ref 0–6)
EPI CELLS # UR: SIGNIFICANT CHANGE UP
GLUCOSE SERPL-MCNC: 214 MG/DL — HIGH (ref 70–115)
GLUCOSE UR QL: NEGATIVE MG/DL — SIGNIFICANT CHANGE UP
HCT VFR BLD CALC: 34.4 % — LOW (ref 37–47)
HGB BLD-MCNC: 10.7 G/DL — LOW (ref 12–16)
INR BLD: 1.07 RATIO — SIGNIFICANT CHANGE UP (ref 0.88–1.16)
KETONES UR-MCNC: NEGATIVE — SIGNIFICANT CHANGE UP
LEUKOCYTE ESTERASE UR-ACNC: ABNORMAL
LIDOCAIN IGE QN: 42 U/L — SIGNIFICANT CHANGE UP (ref 22–51)
LYMPHOCYTES # BLD AUTO: 0.6 K/UL — LOW (ref 1–4.8)
LYMPHOCYTES # BLD AUTO: 7.4 % — LOW (ref 20–55)
MAGNESIUM SERPL-MCNC: 1.6 MG/DL — SIGNIFICANT CHANGE UP (ref 1.6–2.6)
MCHC RBC-ENTMCNC: 25.9 PG — LOW (ref 27–31)
MCHC RBC-ENTMCNC: 31.1 G/DL — LOW (ref 32–36)
MCV RBC AUTO: 83.3 FL — SIGNIFICANT CHANGE UP (ref 81–99)
MONOCYTES # BLD AUTO: 0.6 K/UL — SIGNIFICANT CHANGE UP (ref 0–0.8)
MONOCYTES NFR BLD AUTO: 7.4 % — SIGNIFICANT CHANGE UP (ref 3–10)
NEUTROPHILS # BLD AUTO: 6.4 K/UL — SIGNIFICANT CHANGE UP (ref 1.8–8)
NEUTROPHILS NFR BLD AUTO: 83.7 % — HIGH (ref 37–73)
NITRITE UR-MCNC: NEGATIVE — SIGNIFICANT CHANGE UP
PH UR: 5 — SIGNIFICANT CHANGE UP (ref 5–8)
PLATELET # BLD AUTO: 197 K/UL — SIGNIFICANT CHANGE UP (ref 150–400)
POTASSIUM SERPL-MCNC: 4.9 MMOL/L — SIGNIFICANT CHANGE UP (ref 3.5–5.3)
POTASSIUM SERPL-SCNC: 4.9 MMOL/L — SIGNIFICANT CHANGE UP (ref 3.5–5.3)
PROT SERPL-MCNC: 8 G/DL — SIGNIFICANT CHANGE UP (ref 6.6–8.7)
PROT UR-MCNC: 15 MG/DL
PROTHROM AB SERPL-ACNC: 12.3 SEC — SIGNIFICANT CHANGE UP (ref 10–12.9)
RBC # BLD: 4.13 M/UL — LOW (ref 4.4–5.2)
RBC # FLD: 15.3 % — SIGNIFICANT CHANGE UP (ref 11–15.6)
RBC CASTS # UR COMP ASSIST: SIGNIFICANT CHANGE UP /HPF (ref 0–4)
SODIUM SERPL-SCNC: 138 MMOL/L — SIGNIFICANT CHANGE UP (ref 135–145)
SP GR SPEC: 1.02 — SIGNIFICANT CHANGE UP (ref 1.01–1.02)
TROPONIN T SERPL-MCNC: <0.01 NG/ML — SIGNIFICANT CHANGE UP (ref 0–0.06)
UROBILINOGEN FLD QL: NEGATIVE MG/DL — SIGNIFICANT CHANGE UP
WBC # BLD: 7.7 K/UL — SIGNIFICANT CHANGE UP (ref 4.8–10.8)
WBC # FLD AUTO: 7.7 K/UL — SIGNIFICANT CHANGE UP (ref 4.8–10.8)
WBC UR QL: ABNORMAL

## 2018-11-23 PROCEDURE — 81001 URINALYSIS AUTO W/SCOPE: CPT

## 2018-11-23 PROCEDURE — T1013: CPT

## 2018-11-23 PROCEDURE — 99284 EMERGENCY DEPT VISIT MOD MDM: CPT | Mod: 25

## 2018-11-23 PROCEDURE — 84484 ASSAY OF TROPONIN QUANT: CPT

## 2018-11-23 PROCEDURE — 85610 PROTHROMBIN TIME: CPT

## 2018-11-23 PROCEDURE — 83735 ASSAY OF MAGNESIUM: CPT

## 2018-11-23 PROCEDURE — 85027 COMPLETE CBC AUTOMATED: CPT

## 2018-11-23 PROCEDURE — 83690 ASSAY OF LIPASE: CPT

## 2018-11-23 PROCEDURE — 36415 COLL VENOUS BLD VENIPUNCTURE: CPT

## 2018-11-23 PROCEDURE — 85730 THROMBOPLASTIN TIME PARTIAL: CPT

## 2018-11-23 PROCEDURE — 87086 URINE CULTURE/COLONY COUNT: CPT

## 2018-11-23 PROCEDURE — 96375 TX/PRO/DX INJ NEW DRUG ADDON: CPT

## 2018-11-23 PROCEDURE — 80053 COMPREHEN METABOLIC PANEL: CPT

## 2018-11-23 PROCEDURE — 96365 THER/PROPH/DIAG IV INF INIT: CPT

## 2018-11-23 RX ORDER — METOCLOPRAMIDE HCL 10 MG
10 TABLET ORAL ONCE
Qty: 0 | Refills: 0 | Status: COMPLETED | OUTPATIENT
Start: 2018-11-23 | End: 2018-11-23

## 2018-11-23 RX ORDER — PANTOPRAZOLE SODIUM 20 MG/1
40 TABLET, DELAYED RELEASE ORAL ONCE
Qty: 0 | Refills: 0 | Status: COMPLETED | OUTPATIENT
Start: 2018-11-23 | End: 2018-11-23

## 2018-11-23 RX ORDER — SUCRALFATE 1 G
1 TABLET ORAL ONCE
Qty: 0 | Refills: 0 | Status: COMPLETED | OUTPATIENT
Start: 2018-11-23 | End: 2018-11-23

## 2018-11-23 RX ADMIN — Medication 1 GRAM(S): at 04:58

## 2018-11-23 RX ADMIN — PANTOPRAZOLE SODIUM 40 MILLIGRAM(S): 20 TABLET, DELAYED RELEASE ORAL at 03:46

## 2018-11-23 RX ADMIN — Medication 10 MILLIGRAM(S): at 04:20

## 2018-11-23 RX ADMIN — Medication 104 MILLIGRAM(S): at 03:47

## 2018-11-23 RX ADMIN — Medication 60 MILLIGRAM(S): at 04:57

## 2018-11-23 NOTE — ED PROVIDER NOTE - CARE PLAN
Principal Discharge DX:	GERD (gastroesophageal reflux disease)  Secondary Diagnosis:	Chronic knee pain

## 2018-11-23 NOTE — ED PROVIDER NOTE - NS ED ROS FT
no weight change, no fever or chills  no recent travel, no recent abox, no sick contacts  no recent change in medications  has diffuse rash, no bruises  no visual changes no eye discharge  no cough cold or congestion,   no sob, no chest pain  no orthopnea, no pnd  has abd pain, has n/v, -d  no hematuria, no change in urinary habits  has joint pain, artheritis related deformity  no headache, no paresthesia

## 2018-11-23 NOTE — ED ADULT NURSE NOTE - CHIEF COMPLAINT QUOTE
c/o vomiting, started on new medication [id number] today for her gout and after that she started to  vomit

## 2018-11-23 NOTE — ED ADULT NURSE NOTE - NSIMPLEMENTINTERV_GEN_ALL_ED
Implemented All Fall Risk Interventions:  [place] to call system. Call bell, personal items and telephone within reach. Instruct patient to call for assistance. Room bathroom lighting operational. Non-slip footwear when patient is off stretcher. Physically safe environment: no spills, clutter or unnecessary equipment. Stretcher in lowest position, wheels locked, appropriate side rails in place. Provide visual cue, wrist band, yellow gown, etc. Monitor gait and stability. Monitor for mental status changes and reorient to person, place, and time. Review medications for side effects contributing to fall risk. Reinforce activity limits and safety measures with patient and family.

## 2018-11-23 NOTE — ED ADULT NURSE NOTE - OBJECTIVE STATEMENT
Received patient sitting in bed, a&ox3, able to make needs known. Noted with crutches at bedside as stated " I can't walk well because of my gout". Patient states she started vomiting yesterday morning 50 minutes after taking duloxetine. Patient complains of + nausea, (-) vomiting at this time, complains of abdominal pain. Patient denies chest pain, sob, dizziness, headache and fever. Patient not in distress. To continue to monitor.

## 2018-11-23 NOTE — ED PROVIDER NOTE - OBJECTIVE STATEMENT
History via translation  Presents to emergency room with complaints ofEpigastric pain radiating to the back. Started 4 hoursAfter eating a heavy meal.Pain has been gradual increasing constantNo aggravating factors no relieving factors no previous history of similar complaints. Patient did not take any pain medications.Is passing gas feels abdomen is distended no complaints of chest painNo complains of fever or chills no recent travel. Patient was recently started on a new medication she believes this could be side effect of the medicationShe hasComplaints of psoriasis and chronicKnee pain secondary to Psoriasis arthritis was seen by primary care physician

## 2018-11-23 NOTE — ED PROVIDER NOTE - MEDICAL DECISION MAKING DETAILS
51-year-old female otherwise in good state of health with recent change in medications presents to emergency room with complaints of epigastric painPlan to check labsUltrasound of gallbladder and outpatient follow-up

## 2018-11-23 NOTE — ED ADULT TRIAGE NOTE - CHIEF COMPLAINT QUOTE
c/o vomiting, started on new medication [id number] today for her gout and after that she started to  vomit

## 2018-11-23 NOTE — ED PROVIDER NOTE - PHYSICAL EXAMINATION
Constitutional : Appears uncomfortable, talking in full sentences  Head :NC AT , no swelling  Eyes :eomi, no swelling  Mouth :mm moist,  Neck : supple, trachea in midline  Chest :Og air entry, symm chest expansion, no distress  Heart :S1 S2 distant  Abdomen :abd soft, non tender, obese  Musc/Skel :ext no swelling, no deformity, knee swelling noted, no erythema, not warm  no spine tenderness, distal pulses present  rash dry scalt over extensor surface of body  Neuro  :AAO 3 no focal deficits

## 2018-11-23 NOTE — ED PROVIDER NOTE - PROGRESS NOTE DETAILS
Laboratory results reviewed patient feeling better  Results reviewed with patient explainedDifferential diagnoses  Outpatient follow-up with gastroenterologist advised

## 2018-11-25 LAB
CULTURE RESULTS: SIGNIFICANT CHANGE UP
SPECIMEN SOURCE: SIGNIFICANT CHANGE UP

## 2018-11-26 RX ORDER — NITROFURANTOIN MACROCRYSTAL 50 MG
1 CAPSULE ORAL
Qty: 14 | Refills: 0
Start: 2018-11-26 | End: 2018-12-02

## 2018-11-26 NOTE — ED POST DISCHARGE NOTE - RESULT SUMMARY
+[facility] needs abx, spoke to patient, will RX Macrobid, allergy to PCN, swelling to lips +[facility] needs abx, spoke to patient, will RX Macrobid, allergy to PCN, swelling to lips, f/u in [facility]

## 2018-11-29 ENCOUNTER — APPOINTMENT (OUTPATIENT)
Dept: ORTHOPEDIC SURGERY | Facility: CLINIC | Age: 58
End: 2018-11-29
Payer: MEDICAID

## 2018-11-29 VITALS
BODY MASS INDEX: 33.49 KG/M2 | DIASTOLIC BLOOD PRESSURE: 83 MMHG | WEIGHT: 182 LBS | TEMPERATURE: 98.2 F | HEIGHT: 62 IN | HEART RATE: 87 BPM | SYSTOLIC BLOOD PRESSURE: 132 MMHG

## 2018-11-29 PROCEDURE — 20610 DRAIN/INJ JOINT/BURSA W/O US: CPT | Mod: RT

## 2018-11-29 PROCEDURE — 99214 OFFICE O/P EST MOD 30 MIN: CPT | Mod: 25

## 2018-11-29 RX ORDER — DULOXETINE HYDROCHLORIDE 30 MG/1
30 CAPSULE, DELAYED RELEASE PELLETS ORAL
Qty: 30 | Refills: 2 | Status: DISCONTINUED | COMMUNITY
Start: 2018-10-25 | End: 2018-11-29

## 2018-11-30 ENCOUNTER — APPOINTMENT (OUTPATIENT)
Dept: PULMONOLOGY | Facility: CLINIC | Age: 58
End: 2018-11-30

## 2018-12-06 ENCOUNTER — APPOINTMENT (OUTPATIENT)
Dept: RHEUMATOLOGY | Facility: CLINIC | Age: 58
End: 2018-12-06
Payer: MEDICAID

## 2018-12-06 VITALS
RESPIRATION RATE: 20 BRPM | SYSTOLIC BLOOD PRESSURE: 140 MMHG | DIASTOLIC BLOOD PRESSURE: 80 MMHG | HEART RATE: 83 BPM | HEIGHT: 62 IN | OXYGEN SATURATION: 98 %

## 2018-12-06 DIAGNOSIS — M17.11 UNILATERAL PRIMARY OSTEOARTHRITIS, RIGHT KNEE: ICD-10-CM

## 2018-12-06 DIAGNOSIS — M10.062 IDIOPATHIC GOUT, LEFT KNEE: ICD-10-CM

## 2018-12-06 PROCEDURE — 99215 OFFICE O/P EST HI 40 MIN: CPT | Mod: 25

## 2018-12-06 PROCEDURE — 36415 COLL VENOUS BLD VENIPUNCTURE: CPT

## 2018-12-06 RX ORDER — PREDNISONE 5 MG/1
5 TABLET ORAL
Qty: 12 | Refills: 0 | Status: COMPLETED | COMMUNITY
Start: 2018-10-25 | End: 2018-11-10

## 2018-12-10 PROBLEM — M17.11 PRIMARY OSTEOARTHRITIS OF RIGHT KNEE: Status: RESOLVED | Noted: 2018-05-16 | Resolved: 2018-12-10

## 2018-12-10 PROBLEM — M10.062 ACUTE IDIOPATHIC GOUT OF LEFT KNEE: Status: RESOLVED | Noted: 2018-10-25 | Resolved: 2018-12-10

## 2019-01-08 ENCOUNTER — APPOINTMENT (OUTPATIENT)
Dept: NEPHROLOGY | Facility: CLINIC | Age: 59
End: 2019-01-08

## 2019-01-24 ENCOUNTER — APPOINTMENT (OUTPATIENT)
Dept: RHEUMATOLOGY | Facility: CLINIC | Age: 59
End: 2019-01-24

## 2019-01-24 LAB
25(OH)D3 SERPL-MCNC: 10.9 NG/ML
ALBUMIN SERPL ELPH-MCNC: 4 G/DL
ALBUMIN SERPL ELPH-MCNC: 4.2 G/DL
ALBUMIN SERPL ELPH-MCNC: 4.5 G/DL
ALP BLD-CCNC: 35 U/L
ALP BLD-CCNC: 39 U/L
ALP BLD-CCNC: 45 U/L
ALP BONE SERPL-MCNC: 5.9 MCG/L
ALT SERPL-CCNC: 13 U/L
ALT SERPL-CCNC: 26 U/L
ALT SERPL-CCNC: 9 U/L
ANION GAP SERPL CALC-SCNC: 13 MMOL/L
ANION GAP SERPL CALC-SCNC: 13 MMOL/L
ANION GAP SERPL CALC-SCNC: 14 MMOL/L
AST SERPL-CCNC: 14 U/L
AST SERPL-CCNC: 30 U/L
AST SERPL-CCNC: 9 U/L
BASOPHILS # BLD AUTO: 0.01 K/UL
BASOPHILS # BLD AUTO: 0.02 K/UL
BASOPHILS # BLD AUTO: 0.03 K/UL
BASOPHILS NFR BLD AUTO: 0.3 %
BASOPHILS NFR BLD AUTO: 0.4 %
BASOPHILS NFR BLD AUTO: 0.8 %
BILIRUB SERPL-MCNC: 0.2 MG/DL
BILIRUB SERPL-MCNC: 0.3 MG/DL
BILIRUB SERPL-MCNC: 0.6 MG/DL
BUN SERPL-MCNC: 25 MG/DL
BUN SERPL-MCNC: 28 MG/DL
BUN SERPL-MCNC: 33 MG/DL
CA-I SERPL-SCNC: 1.35 MMOL/L
CALCIUM SERPL-MCNC: 10.1 MG/DL
CALCIUM SERPL-MCNC: 10.1 MG/DL
CALCIUM SERPL-MCNC: 10.4 MG/DL
CALCIUM SERPL-MCNC: 10.9 MG/DL
CHLORIDE SERPL-SCNC: 100 MMOL/L
CHLORIDE SERPL-SCNC: 97 MMOL/L
CHLORIDE SERPL-SCNC: 99 MMOL/L
CO2 SERPL-SCNC: 26 MMOL/L
CO2 SERPL-SCNC: 28 MMOL/L
CO2 SERPL-SCNC: 28 MMOL/L
CREAT SERPL-MCNC: 1.02 MG/DL
CREAT SERPL-MCNC: 1.21 MG/DL
CREAT SERPL-MCNC: 1.24 MG/DL
CRP SERPL-MCNC: 0.66 MG/DL
CRP SERPL-MCNC: 0.82 MG/DL
CRP SERPL-MCNC: 2.2 MG/DL
EOSINOPHIL # BLD AUTO: 0.12 K/UL
EOSINOPHIL # BLD AUTO: 0.15 K/UL
EOSINOPHIL # BLD AUTO: 0.2 K/UL
EOSINOPHIL NFR BLD AUTO: 2.5 %
EOSINOPHIL NFR BLD AUTO: 4 %
EOSINOPHIL NFR BLD AUTO: 5.1 %
ERYTHROCYTE [SEDIMENTATION RATE] IN BLOOD BY WESTERGREN METHOD: 38 MM/HR
ERYTHROCYTE [SEDIMENTATION RATE] IN BLOOD BY WESTERGREN METHOD: 47 MM/HR
ERYTHROCYTE [SEDIMENTATION RATE] IN BLOOD BY WESTERGREN METHOD: 50 MM/HR
G6PD SER-CCNC: 19 U/G HGB
GLUCOSE SERPL-MCNC: 82 MG/DL
GLUCOSE SERPL-MCNC: 83 MG/DL
GLUCOSE SERPL-MCNC: 86 MG/DL
HCT VFR BLD CALC: 34.1 %
HCT VFR BLD CALC: 35 %
HCT VFR BLD CALC: 37.2 %
HGB BLD-MCNC: 10.4 G/DL
HGB BLD-MCNC: 10.9 G/DL
HGB BLD-MCNC: 12.1 G/DL
IMM GRANULOCYTES NFR BLD AUTO: 0.3 %
IMM GRANULOCYTES NFR BLD AUTO: 0.3 %
IMM GRANULOCYTES NFR BLD AUTO: 0.8 %
LYMPHOCYTES # BLD AUTO: 0.66 K/UL
LYMPHOCYTES # BLD AUTO: 0.76 K/UL
LYMPHOCYTES # BLD AUTO: 0.97 K/UL
LYMPHOCYTES NFR BLD AUTO: 15.8 %
LYMPHOCYTES NFR BLD AUTO: 17 %
LYMPHOCYTES NFR BLD AUTO: 25.9 %
MAGNESIUM SERPL-MCNC: 1.6 MG/DL
MAN DIFF?: NORMAL
MCHC RBC-ENTMCNC: 25.2 PG
MCHC RBC-ENTMCNC: 26 PG
MCHC RBC-ENTMCNC: 26.9 PG
MCHC RBC-ENTMCNC: 30.5 GM/DL
MCHC RBC-ENTMCNC: 31.1 GM/DL
MCHC RBC-ENTMCNC: 32.5 GM/DL
MCV RBC AUTO: 82.6 FL
MCV RBC AUTO: 82.7 FL
MCV RBC AUTO: 83.3 FL
MONOCYTES # BLD AUTO: 0.38 K/UL
MONOCYTES # BLD AUTO: 0.52 K/UL
MONOCYTES # BLD AUTO: 0.55 K/UL
MONOCYTES NFR BLD AUTO: 13.9 %
MONOCYTES NFR BLD AUTO: 14.1 %
MONOCYTES NFR BLD AUTO: 7.9 %
NEUTROPHILS # BLD AUTO: 2.06 K/UL
NEUTROPHILS # BLD AUTO: 2.46 K/UL
NEUTROPHILS # BLD AUTO: 3.49 K/UL
NEUTROPHILS NFR BLD AUTO: 55.1 %
NEUTROPHILS NFR BLD AUTO: 63.2 %
NEUTROPHILS NFR BLD AUTO: 72.6 %
PARATHYROID HORMONE INTACT: 17 PG/ML
PHOSPHATE SERPL-MCNC: 4.1 MG/DL
PLATELET # BLD AUTO: 239 K/UL
PLATELET # BLD AUTO: 360 K/UL
PLATELET # BLD AUTO: 385 K/UL
POTASSIUM SERPL-SCNC: 4.2 MMOL/L
POTASSIUM SERPL-SCNC: 4.7 MMOL/L
POTASSIUM SERPL-SCNC: 4.8 MMOL/L
PROT SERPL-MCNC: 7.2 G/DL
PROT SERPL-MCNC: 8.1 G/DL
PROT SERPL-MCNC: 8.4 G/DL
RBC # BLD: 4.13 M/UL
RBC # BLD: 4.2 M/UL
RBC # BLD: 4.5 M/UL
RBC # FLD: 15.1 %
RBC # FLD: 15.3 %
RBC # FLD: 15.4 %
SODIUM SERPL-SCNC: 136 MMOL/L
SODIUM SERPL-SCNC: 141 MMOL/L
SODIUM SERPL-SCNC: 141 MMOL/L
URATE SERPL-MCNC: 11.6 MG/DL
URATE SERPL-MCNC: 9.5 MG/DL
WBC # FLD AUTO: 3.74 K/UL
WBC # FLD AUTO: 3.89 K/UL
WBC # FLD AUTO: 4.81 K/UL

## 2019-02-07 ENCOUNTER — APPOINTMENT (OUTPATIENT)
Dept: RHEUMATOLOGY | Facility: CLINIC | Age: 59
End: 2019-02-07

## 2019-04-17 ENCOUNTER — APPOINTMENT (OUTPATIENT)
Dept: NEPHROLOGY | Facility: CLINIC | Age: 59
End: 2019-04-17
Payer: MEDICAID

## 2019-04-17 VITALS
DIASTOLIC BLOOD PRESSURE: 72 MMHG | SYSTOLIC BLOOD PRESSURE: 130 MMHG | HEIGHT: 62 IN | OXYGEN SATURATION: 94 % | WEIGHT: 183 LBS | BODY MASS INDEX: 33.68 KG/M2 | HEART RATE: 83 BPM

## 2019-04-17 PROCEDURE — 99215 OFFICE O/P EST HI 40 MIN: CPT

## 2019-04-17 NOTE — REVIEW OF SYSTEMS
[Fever] : no fever [Chills] : no chills [Red Eyes] : eyes not red [Eye Pain] : no eye pain [Earache] : no earache [Loss Of Hearing] : no hearing loss [Heart Rate Is Fast] : the heart rate was not fast [Heart Rate Is Slow] : the heart rate was not slow [Shortness Of Breath] : no shortness of breath [Arthralgias] : no arthralgias [Wheezing] : no wheezing [Convulsions] : no convulsions [Joint Pain] : no joint pain [Confused] : no confusion [Suicidal] : not suicidal [Sleep Disturbances] : no sleep disturbances [Easy Bleeding] : no tendency for easy bleeding [Proptosis] : no proptosis [Hot Flashes] : no hot flashes [Easy Bruising] : no tendency for easy bruising [Negative] : Heme/Lymph [de-identified] : psoriasis; LE lesions

## 2019-04-17 NOTE — HISTORY OF PRESENT ILLNESS
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 Patient found to have normal SCr of 0.80 and now presents with SCr of 1.3. Patient reports taking ibuprofen 800mg during gout flares or for arthritis pains. Otherwise feels well; is in no distress. Nonsmoker, nondrinker. No pertinent family history. Referred to me by Dr. Tobias. Pt seen and examined;  hypercalcemic on previous labs. Patient seen and examined; no complaints; on last renal panel found to have Cr 1.3; hypercalcemic; history of sarcoid with numerous pulmonary nodules.Patient seen and examined; on last visit was placed on steroids for sarcoidosis induced hypercalcemia. Has been on steroids for 1 month titrated from 30mg daily down to current of 5mg daily. Feels well; having some LE cramping.\par \par Current: Pt seen/examined; no complaints; doing well; just saw derm for psoriasis; \par Current: Patient seen and examined for follow up of hypercalcemia and elevated Cr. Patient was started on prednisone taper by me; with improved Cr and calcium. Has since been off steroids; however was in ER last week for arthritis of knee; given steroids.

## 2019-04-17 NOTE — ASSESSMENT
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

## 2019-04-17 NOTE — PHYSICAL EXAM
[General Appearance - Alert] : alert [General Appearance - In No Acute Distress] : in no acute distress [General Appearance - Well Nourished] : well nourished [Sclera] : the sclera and conjunctiva were normal [General Appearance - Well Developed] : well developed [Extraocular Movements] : extraocular movements were intact [Hearing Threshold Finger Rub Not [place]] : hearing was normal [Outer Ear] : the ears and nose were normal in appearance [Neck Appearance] : the appearance of the neck was normal [Neck Cervical Mass (___cm)] : no neck mass was observed [Respiration, Rhythm And Depth] : normal respiratory rhythm and effort [Heart Rate And Rhythm] : heart rate was normal and rhythm regular [Auscultation Breath Sounds / Voice Sounds] : lungs were clear to auscultation bilaterally [Heart Sounds Gallop] : no gallops [Heart Sounds] : normal S1 and S2 [Arterial Pulses Carotid] : carotid pulses were normal with no bruits [Bowel Sounds] : normal bowel sounds [Abdomen Soft] : soft [Abnormal Walk] : normal gait [Abdomen Tenderness] : non-tender [No CVA Tenderness] : no ~M costovertebral angle tenderness [Skin Turgor] : normal skin turgor [Skin Color & Pigmentation] : normal skin color and pigmentation [Cranial Nerves] : cranial nerves 2-12 were intact [] : no rash [Oriented To Time, Place, And Person] : oriented to person, place, and time

## 2019-06-26 ENCOUNTER — APPOINTMENT (OUTPATIENT)
Dept: PULMONOLOGY | Facility: CLINIC | Age: 59
End: 2019-06-26
Payer: MEDICAID

## 2019-06-26 VITALS
OXYGEN SATURATION: 98 % | SYSTOLIC BLOOD PRESSURE: 112 MMHG | BODY MASS INDEX: 33.47 KG/M2 | HEART RATE: 87 BPM | DIASTOLIC BLOOD PRESSURE: 80 MMHG | WEIGHT: 183 LBS

## 2019-06-26 PROCEDURE — 99214 OFFICE O/P EST MOD 30 MIN: CPT

## 2019-06-26 RX ORDER — HYDROXYCHLOROQUINE SULFATE 200 MG/1
200 TABLET, FILM COATED ORAL TWICE DAILY
Qty: 60 | Refills: 2 | Status: DISCONTINUED | COMMUNITY
Start: 2018-10-25 | End: 2019-06-26

## 2019-06-26 NOTE — REASON FOR VISIT
[Follow-Up] : a follow-up visit [Sarcoidosis] : sarcoidosis [Shortness of Breath] : shortness of Breath [Sleep Apnea] : sleep apnea [FreeTextEntry2] : obesity

## 2019-06-26 NOTE — PROCEDURE
[FreeTextEntry1] : PFTs performed previously were normal.\par Spirometry subsequently was normal.\par PFTs 9/18/18 - normal

## 2019-06-26 NOTE — REVIEW OF SYSTEMS
[Hypertension] : ~T hypertension [Diabetes] : diabetes mellitus [Fever] : no fever [Chills] : no chills [Dry Eyes] : no dryness of the eyes [Eye Irritation] : no ~T irritation of the eyes [Nasal Congestion] : no nasal congestion [Epistaxis] : no nosebleeds [Postnasal Drip] : no postnasal drip [Sinus Problems] : no sinus problems [Cough] : no cough [Sputum] : not coughing up ~M sputum [Dyspnea] : no dyspnea [Chest Tightness] : no chest tightness [Pleuritic Pain] : no pleuritic pain [Wheezing] : no wheezing [Chest Discomfort] : no chest discomfort [Dysrhythmia] : no dysrhythmia [Murmurs] : no murmurs were heard [Palpitations] : no palpitations [Edema] : ~T edema was not present [Hay Fever] : no hay fever [Itchy Eyes] : no itching of ~T the eyes [Reflux] : no reflux [Nausea] : no nausea [Vomiting] : no vomiting [Constipation] : no constipation [Diarrhea] : no diarrhea [Abdominal Pain] : no abdominal pain [Dysuria] : no dysuria [Trauma] : no ~T physical trauma [Fracture] : no fracture [Anemia] : no anemia [Headache] : no headache [Dizziness] : no dizziness [Syncope] : no fainting [Numbness] : no numbness [Paralysis] : no paralysis was seen [Seizures] : no seizures [Depression] : no depression [Anxiety] : no anxiety [Thyroid Problem] : no thyroid problem

## 2019-06-26 NOTE — PHYSICAL EXAM
[General Appearance - Well Developed] : well developed [Normal Appearance] : normal appearance [Normal Conjunctiva] : the conjunctiva exhibited no abnormalities [General Appearance - In No Acute Distress] : no acute distress [Low Lying Soft Palate] : low lying soft palate [Enlarged Base of the Tongue] : enlargement of the base of the tongue [II] : II [Neck Appearance] : the appearance of the neck was normal [Heart Rate And Rhythm] : heart rate and rhythm were normal [Heart Sounds] : normal S1 and S2 [Murmurs] : no murmurs present [Edema] : no peripheral edema present [] : no respiratory distress [Exaggerated Use Of Accessory Muscles For Inspiration] : no accessory muscle use [Respiration, Rhythm And Depth] : normal respiratory rhythm and effort [Abdomen Soft] : soft [Auscultation Breath Sounds / Voice Sounds] : lungs were clear to auscultation bilaterally [Nail Clubbing] : no clubbing of the fingernails [Abdomen Tenderness] : non-tender [No Focal Deficits] : no focal deficits [Cyanosis, Localized] : no localized cyanosis [Oriented To Time, Place, And Person] : oriented to person, place, and time [FreeTextEntry1] : Patient in wheelchair

## 2019-06-26 NOTE — CONSULT LETTER
[Dear  ___] : Dear  [unfilled], [Consult Letter:] : I had the pleasure of evaluating your patient, [unfilled]. [Consult Closing:] : Thank you very much for allowing me to participate in the care of this patient.  If you have any questions, please do not hesitate to contact me. [Sincerely,] : Sincerely, [Please see my note below.] : Please see my note below. [Leland Sims MD] : Leland Sims MD [FreeTextEntry3] : Leland Sims MD, FCCP, SEMAJ. ABSM\par

## 2019-06-26 NOTE — HISTORY OF PRESENT ILLNESS
[Excess Weight] : excess weight [Joint Pain] : joint pain [Dyspnea] : dyspnea [Low Calorie Diet] : low calorie diet [Fair Tolerance] : fair tolerance of treatment [Fair Compliance] : fair compliance with treatment [Diabetes] : diabetes [Hypertension] : hypertension [High] : high [Low Calorie] : low calorie [Infrequently] : exercises infrequently [Well Balanced Diet] : well balanced meals [Walking] : walking [Follow-Up - Routine Clinic] : a routine clinic follow-up of [Excessive Daytime Sleepiness] : excessive daytime sleepiness [Snoring] : snoring [Unrefreshing Sleep] : unrefreshing sleep [Currently Experiencing] : The patient is currently experiencing symptoms. [Sleepy When Sedentary] : sleepy when sedentary [CPAP] : CPAP [None] : No associated symptoms are reported [FreeTextEntry1] : The patient recently suffered an ankle injury and was on crutches and a wheelchair but now is walking without support.\par She is followed by hematology for anemia. [Witnessed Apnea During Sleep] : no witnessed apnea during sleep [Witnessed Gasping During Sleep] : no witnessed gasping during sleep [Poor Compliance] : poor compliance with treatment [Poor Tolerance] : poor tolerance of treatment [Poor Symptom Control] : poor symptom control [de-identified] : at 6 cm of water

## 2019-08-06 ENCOUNTER — RESULT REVIEW (OUTPATIENT)
Age: 59
End: 2019-08-06

## 2019-09-03 ENCOUNTER — APPOINTMENT (OUTPATIENT)
Dept: NEPHROLOGY | Facility: CLINIC | Age: 59
End: 2019-09-03
Payer: MEDICAID

## 2019-09-03 VITALS
HEART RATE: 88 BPM | SYSTOLIC BLOOD PRESSURE: 140 MMHG | WEIGHT: 185 LBS | HEIGHT: 62 IN | OXYGEN SATURATION: 98 % | BODY MASS INDEX: 34.04 KG/M2 | DIASTOLIC BLOOD PRESSURE: 70 MMHG

## 2019-09-03 PROCEDURE — 99215 OFFICE O/P EST HI 40 MIN: CPT

## 2019-09-03 NOTE — HISTORY OF PRESENT ILLNESS
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 Patient found to have normal SCr of 0.80 and now presents with SCr of 1.3. Patient reports taking ibuprofen 800mg during gout flares or for arthritis pains. Otherwise feels well; is in no distress. Nonsmoker, nondrinker. No pertinent family history. Referred to me by Dr. Tobias. Pt seen and examined;  hypercalcemic on previous labs. Patient seen and examined; no complaints; on last renal panel found to have Cr 1.3; hypercalcemic; history of sarcoid with numerous pulmonary nodules.Patient seen and examined; on last visit was placed on steroids for sarcoidosis induced hypercalcemia. Has been on steroids for 1 month titrated from 30mg daily down to current of 5mg daily. Feels well; having some LE cramping.\par Pt seen/examined; no complaints; doing well; just saw derm for psoriasis; Patient seen and examined for follow up of hypercalcemia and elevated Cr. Patient was started on prednisone taper by me; with improved Cr and calcium. Has since been off steroids; however was in ER last week for arthritis of knee; given steroids.\par \par Current: Pt seen/examined ; appears to be having a sarcoid flare; Ca up to 12; Cr up to 1.37;

## 2019-09-03 NOTE — REVIEW OF SYSTEMS
[Chills] : no chills [Fever] : no fever [Eye Pain] : no eye pain [Red Eyes] : eyes not red [Earache] : no earache [Loss Of Hearing] : no hearing loss [Heart Rate Is Fast] : the heart rate was not fast [Heart Rate Is Slow] : the heart rate was not slow [Shortness Of Breath] : no shortness of breath [Arthralgias] : no arthralgias [Wheezing] : no wheezing [Confused] : no confusion [Joint Pain] : no joint pain [Convulsions] : no convulsions [Suicidal] : not suicidal [Sleep Disturbances] : no sleep disturbances [Hot Flashes] : no hot flashes [Proptosis] : no proptosis [Easy Bleeding] : no tendency for easy bleeding [Easy Bruising] : no tendency for easy bruising [Negative] : Heme/Lymph [de-identified] : psoriasis; LE lesions

## 2019-09-03 NOTE — PHYSICAL EXAM
[General Appearance - Alert] : alert [General Appearance - In No Acute Distress] : in no acute distress [General Appearance - Well Nourished] : well nourished [General Appearance - Well Developed] : well developed [Extraocular Movements] : extraocular movements were intact [Sclera] : the sclera and conjunctiva were normal [Neck Appearance] : the appearance of the neck was normal [Outer Ear] : the ears and nose were normal in appearance [Hearing Threshold Finger Rub Not [place]] : hearing was normal [Neck Cervical Mass (___cm)] : no neck mass was observed [Respiration, Rhythm And Depth] : normal respiratory rhythm and effort [Auscultation Breath Sounds / Voice Sounds] : lungs were clear to auscultation bilaterally [Heart Rate And Rhythm] : heart rate was normal and rhythm regular [Heart Sounds] : normal S1 and S2 [Arterial Pulses Carotid] : carotid pulses were normal with no bruits [Heart Sounds Gallop] : no gallops [Abdomen Tenderness] : non-tender [Bowel Sounds] : normal bowel sounds [Abdomen Soft] : soft [Abnormal Walk] : normal gait [No CVA Tenderness] : no ~M costovertebral angle tenderness [Skin Turgor] : normal skin turgor [Skin Color & Pigmentation] : normal skin color and pigmentation [Cranial Nerves] : cranial nerves 2-12 were intact [] : no rash [Oriented To Time, Place, And Person] : oriented to person, place, and time

## 2019-09-03 NOTE — ASSESSMENT
[FreeTextEntry1] : 1) CKD III in the setting of HTN, DM, gout, NSAID use\par 2) Gout\par 3) HTN\par 4) DM\par 5) Obesity\par \par Pt has had multiple rebounds of sarcoid with SHORT tapers; will need to prolong therapy at higher dose considering this is her third flare within the year;\par Will start prednisone 40mg daily for 1 month; 30mg daily for 1 month after; etc; for 6 month taper;\par Guidelines below:\par \par Initial therapy — Therapy is usually initiated with oral prednisone at a daily dose of 0.3 to 0.6 mg/kg ideal body weight (usually 20 to 40 mg/day), depending on the severity of disease activity [2,18]. For patients with shortness of breath on exertion and slowly worsening radiographic opacities, the lower dose is usually adequate. For patients with rapidly progressive disease and severe impairment (eg, oxygen dependent), we favor using the higher end of the dose range. \par \par The initial dose is continued for four to six weeks and then the patient is re-assessed. If the symptoms, radiographic abnormalities, and pulmonary function tests (eg, spirometry, diffusing capacity, ambulatory oximetry) are stable or improved, the dose is tapered (see 'Assessing the response' below). Tapering schedules vary, but a common schedule is to decrease by 5 to 10 mg decrements every 4 to 12 weeks down to 0.2 to 0.4 mg/kg (approximately 10 to 15 mg/day). \par \par If the clinical and physiologic parameters are unimproved after four to six weeks at the initial dose, we continue that dose an additional four to six weeks. Most of the improvement with glucocorticoids is apparent in three to four weeks' time [3,19]. (See 'Assessing the response' below.)\par \par High-dose oral glucocorticoid therapy (80 to 100 mg/day) may rarely be warranted in patients with acute respiratory failure or concomitant cardiac, neurologic, ocular, or upper airway disease [20-22]. This level is continued until the disease is under control (usually 4 to 12 weeks). Once improvement is achieved, the dose is tapered as described for the maintenance phase above. (See "Neurologic sarcoidosis" and "Clinical manifestations and diagnosis of cardiac sarcoidosis".)\par \par Maintenance therapy — No formal data are available to guide maintenance dosing of oral glucocorticoids. Based on clinical experience, a maintenance dose of prednisone in the range of 0.25 to 0.4 mg/kg (usually 10 to 15 mg) per day will prevent worsening of disease [2]. Alternate day therapy with prednisone has also been used for maintenance after initial daily therapy to reduce the risk of adverse effects from the glucocorticoids, but few data are available to support its efficacy. Likely, the benefits are comparable to similar doses of daily glucocorticoid therapy [23]. \par \par During the maintenance phase, the patient is reassessed at 4 to 12 week intervals for evidence of symptomatic worsening or development of glucocorticoid-related adverse effects. (See 'Assessing the response' below and 'Adverse effects' below.)\par \par Recurrence of symptoms, such as cough, dyspnea, and chest pain, is common (occurring in approximately 60 percent of patients), so we continue the maintenance dose for at least three to six to months, giving a total treatment period of approximately one year. Frequently, a brief course of higher doses (increases of 10 to 20 mg above the maintenance dose given for two to four weeks) is required to relieve an episode of recurrent symptoms. (See "Glucocorticoid withdrawal".)\par \par The majority of patients are able to discontinue systemic glucocorticoids after one year [24]. However, approximately one-third will develop a relapse and require another course of therapy, and a small number of patients require more long-term or indefinite maintenance therapy to control their symptoms [25]. \par \par Duration of therapy — The proper length of therapy in patients who respond to treatment is not known [4,12,26]. Therapy must be given for at least three to six months to be effective and to reduce the likelihood of relapse. Relapses are frequent following reduction or withdrawal of therapy, ranging from 14 to 74 percent among those with disease of recent onset (=5 years) [3]. Consequently, we usually aim for at least one year of therapy. \par \par Lifelong low dose treatment (=0.25 mg/kg per day, or 0.25 to 0.5 mg/kg on alternate days) may be required by a minority of patients who suffer frequent relapses. 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

## 2019-09-05 ENCOUNTER — APPOINTMENT (OUTPATIENT)
Dept: NEPHROLOGY | Facility: CLINIC | Age: 59
End: 2019-09-05

## 2019-09-17 ENCOUNTER — APPOINTMENT (OUTPATIENT)
Dept: PULMONOLOGY | Facility: CLINIC | Age: 59
End: 2019-09-17
Payer: MEDICAID

## 2019-09-17 VITALS — HEIGHT: 62 IN | BODY MASS INDEX: 34.04 KG/M2 | WEIGHT: 185 LBS

## 2019-09-17 VITALS — OXYGEN SATURATION: 98 % | DIASTOLIC BLOOD PRESSURE: 78 MMHG | SYSTOLIC BLOOD PRESSURE: 124 MMHG | HEART RATE: 67 BPM

## 2019-09-17 PROCEDURE — 94729 DIFFUSING CAPACITY: CPT

## 2019-09-17 PROCEDURE — 85018 HEMOGLOBIN: CPT | Mod: QW

## 2019-09-17 PROCEDURE — 99215 OFFICE O/P EST HI 40 MIN: CPT | Mod: 25

## 2019-09-17 PROCEDURE — 94727 GAS DIL/WSHOT DETER LNG VOL: CPT

## 2019-09-17 PROCEDURE — 94010 BREATHING CAPACITY TEST: CPT

## 2019-09-17 NOTE — REASON FOR VISIT
[Follow-Up] : a follow-up visit [Shortness of Breath] : shortness of Breath [Sarcoidosis] : sarcoidosis [Sleep Apnea] : sleep apnea [FreeTextEntry2] : obesity

## 2019-09-17 NOTE — PHYSICAL EXAM
[Normal Appearance] : normal appearance [General Appearance - Well Developed] : well developed [General Appearance - In No Acute Distress] : no acute distress [Normal Conjunctiva] : the conjunctiva exhibited no abnormalities [Low Lying Soft Palate] : low lying soft palate [Enlarged Base of the Tongue] : enlargement of the base of the tongue [II] : II [Neck Appearance] : the appearance of the neck was normal [Heart Rate And Rhythm] : heart rate and rhythm were normal [Heart Sounds] : normal S1 and S2 [Murmurs] : no murmurs present [Edema] : no peripheral edema present [] : no respiratory distress [Respiration, Rhythm And Depth] : normal respiratory rhythm and effort [Auscultation Breath Sounds / Voice Sounds] : lungs were clear to auscultation bilaterally [Exaggerated Use Of Accessory Muscles For Inspiration] : no accessory muscle use [Abdomen Soft] : soft [Abdomen Tenderness] : non-tender [Cyanosis, Localized] : no localized cyanosis [Nail Clubbing] : no clubbing of the fingernails [Oriented To Time, Place, And Person] : oriented to person, place, and time [No Focal Deficits] : no focal deficits [FreeTextEntry1] : Patient in wheelchair

## 2019-09-17 NOTE — REVIEW OF SYSTEMS
[Hypertension] : ~T hypertension [Diabetes] : diabetes mellitus [Chills] : no chills [Fever] : no fever [Dry Eyes] : no dryness of the eyes [Eye Irritation] : no ~T irritation of the eyes [Nasal Congestion] : no nasal congestion [Epistaxis] : no nosebleeds [Postnasal Drip] : no postnasal drip [Cough] : no cough [Sinus Problems] : no sinus problems [Sputum] : not coughing up ~M sputum [Dyspnea] : no dyspnea [Chest Tightness] : no chest tightness [Pleuritic Pain] : no pleuritic pain [Wheezing] : no wheezing [Chest Discomfort] : no chest discomfort [Dysrhythmia] : no dysrhythmia [Murmurs] : no murmurs were heard [Palpitations] : no palpitations [Edema] : ~T edema was not present [Hay Fever] : no hay fever [Itchy Eyes] : no itching of ~T the eyes [Reflux] : no reflux [Nausea] : no nausea [Vomiting] : no vomiting [Constipation] : no constipation [Diarrhea] : no diarrhea [Abdominal Pain] : no abdominal pain [Dysuria] : no dysuria [Trauma] : no ~T physical trauma [Headache] : no headache [Anemia] : no anemia [Fracture] : no fracture [Dizziness] : no dizziness [Syncope] : no fainting [Numbness] : no numbness [Paralysis] : no paralysis was seen [Anxiety] : no anxiety [Depression] : no depression [Seizures] : no seizures [Thyroid Problem] : no thyroid problem

## 2019-09-17 NOTE — DISCUSSION/SUMMARY
[FreeTextEntry1] : \par #1. CPAP at 6 cm of water to treat moderate to severe WALTER; encouraged compliance\par #2. PFTs performed previously and subsequent spirometry were normal \par #3. Diet and exercise for weight loss\par #4. Repeat chest CT to f/u sarcoidosis reveals stability; pt is asymptomatic with normal lung function so would consider repeat chest CT in 2 months for 4 month f/u given recent progression on CT\par #5. F/u in 2 months to reassess response to CPAP\par #6. Replace equipment as needed; ordered 6/26/19 with [employer] was given to pt to improve compliance\par #7. SOBOE is likely related to weight or deconditioning given normal PFTs\par #8. The patient does not appear to require BD therapy at this time\par #9. Consider dental evaluation for an oral appliance as alternative therapy for treatment of CPAP if clinically indicated\par #10. Consider ACE level

## 2019-09-17 NOTE — CONSULT LETTER
[Dear  ___] : Dear  [unfilled], [Consult Letter:] : I had the pleasure of evaluating your patient, [unfilled]. [Please see my note below.] : Please see my note below. [Consult Closing:] : Thank you very much for allowing me to participate in the care of this patient.  If you have any questions, please do not hesitate to contact me. [Sincerely,] : Sincerely, [Leland Sims MD] : Leland Sims MD [FreeTextEntry3] : Leland Sims MD, FCCP, D. ABSM\par Pulmonary and Sleep Medicine\par [facility] [place]\par \par

## 2019-09-17 NOTE — HISTORY OF PRESENT ILLNESS
[Excess Weight] : excess weight [Dyspnea] : dyspnea [Joint Pain] : joint pain [Low Calorie Diet] : low calorie diet [Fair Compliance] : fair compliance with treatment [Fair Tolerance] : fair tolerance of treatment [Poor Symptom Control] : poor symptom control [Diabetes] : diabetes [High] : high [Hypertension] : hypertension [Low Calorie] : low calorie [Well Balanced Diet] : well balanced meals [Infrequently] : exercises infrequently [Walking] : walking [Excessive Daytime Sleepiness] : excessive daytime sleepiness [Follow-Up - Routine Clinic] : a routine clinic follow-up of [Unrefreshing Sleep] : unrefreshing sleep [Snoring] : snoring [Sleepy When Sedentary] : sleepy when sedentary [Currently Experiencing] : The patient is currently experiencing symptoms. [None] : No associated symptoms are reported [CPAP] : CPAP [Good Tolerance] : good tolerance of treatment [Good Compliance] : good compliance with treatment [Good Symptom Control] : good symptom control [FreeTextEntry1] : The patient recently suffered an ankle injury and was on crutches and a wheelchair but now is walking without support.\par She is followed by hematology for anemia. [Witnessed Apnea During Sleep] : no witnessed apnea during sleep [Witnessed Gasping During Sleep] : no witnessed gasping during sleep [de-identified] : at 6 cm of water

## 2019-09-17 NOTE — PROCEDURE
[FreeTextEntry1] : PFTs performed previously were normal.\par Spirometry subsequently was normal.\par PFTs 9/18/18 - normal\par PFTs 9/17/19 - normal and without significant change

## 2019-10-22 ENCOUNTER — APPOINTMENT (OUTPATIENT)
Dept: NEPHROLOGY | Facility: CLINIC | Age: 59
End: 2019-10-22
Payer: MEDICAID

## 2019-10-22 VITALS
HEART RATE: 85 BPM | DIASTOLIC BLOOD PRESSURE: 82 MMHG | WEIGHT: 191 LBS | SYSTOLIC BLOOD PRESSURE: 126 MMHG | BODY MASS INDEX: 35.15 KG/M2 | HEIGHT: 62 IN

## 2019-10-22 PROCEDURE — 99215 OFFICE O/P EST HI 40 MIN: CPT | Mod: 25

## 2019-10-22 PROCEDURE — 36415 COLL VENOUS BLD VENIPUNCTURE: CPT

## 2019-10-22 NOTE — PHYSICAL EXAM
[General Appearance - In No Acute Distress] : in no acute distress [General Appearance - Alert] : alert [General Appearance - Well Nourished] : well nourished [Sclera] : the sclera and conjunctiva were normal [General Appearance - Well Developed] : well developed [Extraocular Movements] : extraocular movements were intact [Outer Ear] : the ears and nose were normal in appearance [Neck Appearance] : the appearance of the neck was normal [Hearing Threshold Finger Rub Not Wilbarger] : hearing was normal [Neck Cervical Mass (___cm)] : no neck mass was observed [Respiration, Rhythm And Depth] : normal respiratory rhythm and effort [Auscultation Breath Sounds / Voice Sounds] : lungs were clear to auscultation bilaterally [Heart Rate And Rhythm] : heart rate was normal and rhythm regular [Heart Sounds] : normal S1 and S2 [Heart Sounds Gallop] : no gallops [Abdomen Soft] : soft [Bowel Sounds] : normal bowel sounds [Arterial Pulses Carotid] : carotid pulses were normal with no bruits [No CVA Tenderness] : no ~M costovertebral angle tenderness [Abdomen Tenderness] : non-tender [Abnormal Walk] : normal gait [Skin Color & Pigmentation] : normal skin color and pigmentation [Skin Turgor] : normal skin turgor [] : no rash [Cranial Nerves] : cranial nerves 2-12 were intact [Oriented To Time, Place, And Person] : oriented to person, place, and time

## 2019-10-22 NOTE — ASSESSMENT
[FreeTextEntry1] : 1) CKD III in the setting of HTN, DM, gout, NSAID use\par 2) Gout\par 3) HTN\par 4) DM\par 5) Obesity\par \par Pt has had multiple rebounds of sarcoid with SHORT tapers; will need to prolong therapy at higher dose considering this is her third flare within the year;\par On 6 month taper; did 1 month of 40 mg; on 30mg daily Oct 3-Nov 2; then 20mg Nov 3-Dec 2; then 15mg Dec 3-Jan 2; will see me Dec 3;\par Seeing rheumatology for gout\par Guidelines below:\par \par Initial therapy — Therapy is usually initiated with oral prednisone at a daily dose of 0.3 to 0.6 mg/kg ideal body weight (usually 20 to 40 mg/day), depending on the severity of disease activity [2,18]. For patients with shortness of breath on exertion and slowly worsening radiographic opacities, the lower dose is usually adequate. For patients with rapidly progressive disease and severe impairment (eg, oxygen dependent), we favor using the higher end of the dose range. \par \par The initial dose is continued for four to six weeks and then the patient is re-assessed. If the symptoms, radiographic abnormalities, and pulmonary function tests (eg, spirometry, diffusing capacity, ambulatory oximetry) are stable or improved, the dose is tapered (see 'Assessing the response' below). Tapering schedules vary, but a common schedule is to decrease by 5 to 10 mg decrements every 4 to 12 weeks down to 0.2 to 0.4 mg/kg (approximately 10 to 15 mg/day). \par \par If the clinical and physiologic parameters are unimproved after four to six weeks at the initial dose, we continue that dose an additional four to six weeks. Most of the improvement with glucocorticoids is apparent in three to four weeks' time [3,19]. (See 'Assessing the response' below.)\par \par High-dose oral glucocorticoid therapy (80 to 100 mg/day) may rarely be warranted in patients with acute respiratory failure or concomitant cardiac, neurologic, ocular, or upper airway disease [20-22]. This level is continued until the disease is under control (usually 4 to 12 weeks). Once improvement is achieved, the dose is tapered as described for the maintenance phase above. (See "Neurologic sarcoidosis" and "Clinical manifestations and diagnosis of cardiac sarcoidosis".)\par \par Maintenance therapy — No formal data are available to guide maintenance dosing of oral glucocorticoids. Based on clinical experience, a maintenance dose of prednisone in the range of 0.25 to 0.4 mg/kg (usually 10 to 15 mg) per day will prevent worsening of disease [2]. Alternate day therapy with prednisone has also been used for maintenance after initial daily therapy to reduce the risk of adverse effects from the glucocorticoids, but few data are available to support its efficacy. Likely, the benefits are comparable to similar doses of daily glucocorticoid therapy [23]. \par \par During the maintenance phase, the patient is reassessed at 4 to 12 week intervals for evidence of symptomatic worsening or development of glucocorticoid-related adverse effects. (See 'Assessing the response' below and 'Adverse effects' below.)\par \par Recurrence of symptoms, such as cough, dyspnea, and chest pain, is common (occurring in approximately 60 percent of patients), so we continue the maintenance dose for at least three to six to months, giving a total treatment period of approximately one year. Frequently, a brief course of higher doses (increases of 10 to 20 mg above the maintenance dose given for two to four weeks) is required to relieve an episode of recurrent symptoms. (See "Glucocorticoid withdrawal".)\par \par The majority of patients are able to discontinue systemic glucocorticoids after one year [24]. However, approximately one-third will develop a relapse and require another course of therapy, and a small number of patients require more long-term or indefinite maintenance therapy to control their symptoms [25]. \par \par Duration of therapy — The proper length of therapy in patients who respond to treatment is not known [4,12,26]. Therapy must be given for at least three to six months to be effective and to reduce the likelihood of relapse. Relapses are frequent following reduction or withdrawal of therapy, ranging from 14 to 74 percent among those with disease of recent onset (=5 years) [3]. Consequently, we usually aim for at least one year of therapy. \par \par Lifelong low dose treatment (=0.25 mg/kg per day, or 0.25 to 0.5 mg/kg on alternate days) may be required by a minority of patients who suffer frequent relapses. 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

## 2019-10-22 NOTE — REVIEW OF SYSTEMS
[Fever] : no fever [Chills] : no chills [Eye Pain] : no eye pain [Red Eyes] : eyes not red [Loss Of Hearing] : no hearing loss [Earache] : no earache [Heart Rate Is Fast] : the heart rate was not fast [Heart Rate Is Slow] : the heart rate was not slow [Shortness Of Breath] : no shortness of breath [Joint Pain] : no joint pain [Wheezing] : no wheezing [Arthralgias] : no arthralgias [Confused] : no confusion [Convulsions] : no convulsions [Proptosis] : no proptosis [Suicidal] : not suicidal [Sleep Disturbances] : no sleep disturbances [Hot Flashes] : no hot flashes [Easy Bleeding] : no tendency for easy bleeding [Easy Bruising] : no tendency for easy bruising [de-identified] : psoriasis; LE lesions [Negative] : Heme/Lymph

## 2019-10-22 NOTE — HISTORY OF PRESENT ILLNESS
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 Patient found to have normal SCr of 0.80 and now presents with SCr of 1.3. Patient reports taking ibuprofen 800mg during gout flares or for arthritis pains. Otherwise feels well; is in no distress. Nonsmoker, nondrinker. No pertinent family history. Referred to me by Dr. Tobias. Pt seen and examined;  hypercalcemic on previous labs. Patient seen and examined; no complaints; on last renal panel found to have Cr 1.3; hypercalcemic; history of sarcoid with numerous pulmonary nodules.Patient seen and examined; on last visit was placed on steroids for sarcoidosis induced hypercalcemia. Has been on steroids for 1 month titrated from 30mg daily down to current of 5mg daily. Feels well; having some LE cramping.\par Pt seen/examined; no complaints; doing well; just saw derm for psoriasis; Patient seen and examined for follow up of hypercalcemia and elevated Cr. Patient was started on prednisone taper by me; with improved Cr and calcium. Has since been off steroids; however was in ER last week for arthritis of knee; given steroids. Pt seen/examined ; appears to be having a sarcoid flare; Ca up to 12; Cr up to 1.37;\par \par Current: Pt seen/examined; last Cr 1.49 before starting steroids; was 1.3 with me in office; now on prednisone for a month; was on 40mg for a month; decreased to 30mg for a month, on taper; Nov 3rd going to 20mg;

## 2019-10-29 LAB
ALBUMIN SERPL ELPH-MCNC: 4.2 G/DL
ANION GAP SERPL CALC-SCNC: 12 MMOL/L
BUN SERPL-MCNC: 33 MG/DL
CALCIUM SERPL-MCNC: 10 MG/DL
CHLORIDE SERPL-SCNC: 102 MMOL/L
CO2 SERPL-SCNC: 26 MMOL/L
CREAT SERPL-MCNC: 0.94 MG/DL
GLUCOSE SERPL-MCNC: 104 MG/DL
PHOSPHATE SERPL-MCNC: 2.9 MG/DL
POTASSIUM SERPL-SCNC: 5.1 MMOL/L
SODIUM SERPL-SCNC: 140 MMOL/L

## 2019-10-30 ENCOUNTER — APPOINTMENT (OUTPATIENT)
Dept: RHEUMATOLOGY | Facility: CLINIC | Age: 59
End: 2019-10-30
Payer: MEDICAID

## 2019-10-30 VITALS
HEART RATE: 92 BPM | DIASTOLIC BLOOD PRESSURE: 66 MMHG | SYSTOLIC BLOOD PRESSURE: 130 MMHG | BODY MASS INDEX: 35.15 KG/M2 | WEIGHT: 191 LBS | TEMPERATURE: 98.7 F | RESPIRATION RATE: 18 BRPM | OXYGEN SATURATION: 98 % | HEIGHT: 62 IN

## 2019-10-30 PROCEDURE — 99215 OFFICE O/P EST HI 40 MIN: CPT

## 2019-10-30 NOTE — ASSESSMENT
[FreeTextEntry1] : 1. Gout - h/o R 1st MTP, reaction to allopurinol in past with facial rash/hypertensive.  No current gouty arthritis activity since first occurrence.  No treatment needed at this time for urate lowering therapy. If Uric acid levels > 12 or in setting of CKD or with repeat occurrences >2x/year will consider.  Uric acid last was 11.0, usually above this level I would recommend ULT, will recheck labs today for evaluation.  However, patient had reaction to allopurinol, so we cannot re-attempt use.  Furthermore, Uloric (febuxostat) now will be getting blackbox warnings of increased MI, so it is also not an option.  If her Cr is stable, I will want to start Lesinurad to lower her uric acid. She may also benefit from a weekly 0.6mg of colchicine for gout attack ppx while on a ULT, which should be continued for at least 3 months, with the attack risk lowering by 6 months after start of ULT\par Will plan to start Uloric- reviewed CAD/CVA risks, LighTouch low, recommended low dose.\par 2. Sarcoidosis - mostly pulmonary involvement with SOB that has improved, received no treatment. CT chest with mediastinal and hilar LAD, confirmed with LN bx.  Episode of nephrolithiasis 2/2 calcium stone in past, unknown if hypercalcemic at this time.  No evidence of sarcoid arthropathy.  Stable disease on recent CT imaging, followed by pulmonary with normal PFTs and no changes. Now evidence hypercalcemia, but no other stigmata of sarcoid w/ stable pulmonary disease - no E nodosum, rash, inflammatory arthritis (other than L knee crystalline arthritis), uveitis.\par 3. Psoriasis - generalized psoriatic plaques now localized to b/l shins and knees with little erythema, uses topicals if needed and moisturizes/emollients. No evidence of psoriatic arthropathy.\par 4. B/L knee OA - XR with mod tricomp narrowing on XR. Stable disease without symptoms currently.\par 5. CKD - was taking ibuprofen frequently.  Now was told she needs to stop due to CKD.  Last uric acid 10.6 She should remain off NSAIDs. Agree about remaining off probenecid for now, last renal function GFR~60s with normal Cr\par \par - labs\par - consider Uloric 40mg/d\par - c/w prednisone, taper recommended over next 3 months to off since stable pulm disease\par - pulm f/u\par - renal f/u\par \par RTO 12 weeks

## 2019-10-30 NOTE — PHYSICAL EXAM
[General Appearance - Alert] : alert [General Appearance - In No Acute Distress] : in no acute distress [General Appearance - Well Nourished] : well nourished [General Appearance - Well-Appearing] : healthy appearing [Sclera] : the sclera and conjunctiva were normal [Extraocular Movements] : extraocular movements were intact [Outer Ear] : the ears and nose were normal in appearance [Hearing Threshold Finger Rub Not [place]] : hearing was normal [Examination Of The Oral Cavity] : the lips and gums were normal [Nasal Cavity] : the nasal mucosa and septum were normal [Oropharynx] : the oropharynx was normal [Neck Appearance] : the appearance of the neck was normal [Neck Cervical Mass (___cm)] : no neck mass was observed [Jugular Venous Distention Increased] : there was no jugular-venous distention [Thyroid Diffuse Enlargement] : the thyroid was not enlarged [Thyroid Nodule] : there were no palpable thyroid nodules [Respiration, Rhythm And Depth] : normal respiratory rhythm and effort [Auscultation Breath Sounds / Voice Sounds] : lungs were clear to auscultation bilaterally [Heart Rate And Rhythm] : heart rate was normal and rhythm regular [Heart Sounds] : normal S1 and S2 [Heart Sounds Gallop] : no gallops [Murmurs] : no murmurs [Heart Sounds Pericardial Friction Rub] : no pericardial rub [Full Pulse] : the pedal pulses are present [Edema] : there was no peripheral edema [Veins - Varicosity Changes] : there were no varicosital changes [Bowel Sounds] : normal bowel sounds [Abdomen Soft] : soft [Abdomen Tenderness] : non-tender [Abdomen Mass (___ Cm)] : no abdominal mass palpated [Cervical Lymph Nodes Enlarged Posterior Bilaterally] : posterior cervical [Cervical Lymph Nodes Enlarged Anterior Bilaterally] : anterior cervical [Supraclavicular Lymph Nodes Enlarged Bilaterally] : supraclavicular [No CVA Tenderness] : no ~M costovertebral angle tenderness [No Spinal Tenderness] : no spinal tenderness [Abnormal Walk] : normal gait [Nail Clubbing] : no clubbing  or cyanosis of the fingernails [Musculoskeletal - Swelling] : no joint swelling seen [Motor Tone] : muscle strength and tone were normal [Skin Color & Pigmentation] : normal skin color and pigmentation [Skin Turgor] : normal skin turgor [] : no rash [Skin Lesions] : no skin lesions [No Focal Deficits] : no focal deficits [Oriented To Time, Place, And Person] : oriented to person, place, and time [Impaired Insight] : insight and judgment were intact [Affect] : the affect was normal [Mood] : the mood was normal [FreeTextEntry1] : B/L patellofemoral crepitus in knees, no effusions.  ROM all joints intact, no synovitis or tenderness.

## 2019-10-30 NOTE — DATA REVIEWED
[No studies available for review at this time.] : No studies available for review at this time. [FreeTextEntry1] : XR R knee as scanned \par Path report 6/2015 - non-necrotizing granulomas without fungi or AFB, no evidence of malignancy\par CT chest 4/2015 - increasing mediastinal/hilar LAD\par Pharm stress test 4/2015 - normal

## 2019-10-30 NOTE — HISTORY OF PRESENT ILLNESS
[___ Month(s) Ago] : [unfilled] month(s) ago [None] : The patient is currently asymptomatic [FreeTextEntry1] : - has not been seen in over 11 months - was placed on probenecid - pt returning here being told it was bad for her kidneys from her PCP\par - has been seeing pulmonary and renal- was placed on higher steroids due to her sarcoidosis, however spirometry was normal and not indicating any active restrictive/obstructive process. Last CT chest stable w/o changes, planned for possible CPAP and believes SOBOE is likely 2/2 weight and deconditioning, not active sarcoidosis.\par - reports rash in past w/ Allopurinol, not on Uloric 2/2 coverage issues.\par - has had free water intake increased for hypercalcemia likely 2/2 sarcoid with elevated 1,25-hydroxylation of D3 currently.\par - mild arthralgia of hands, shoulders constantly, reports 3-5/10 at times, but today < 2/10\par - not currently on any ULT, was on probenecid that she stopped.\par - Psoriasis inactive after UV treatments, no active plaques [de-identified] : \par \par All other ROS negative except as mentioned in HPI.

## 2019-11-21 ENCOUNTER — APPOINTMENT (OUTPATIENT)
Dept: PULMONOLOGY | Facility: CLINIC | Age: 59
End: 2019-11-21

## 2019-12-03 ENCOUNTER — APPOINTMENT (OUTPATIENT)
Dept: NEPHROLOGY | Facility: CLINIC | Age: 59
End: 2019-12-03
Payer: MEDICAID

## 2019-12-03 ENCOUNTER — LABORATORY RESULT (OUTPATIENT)
Age: 59
End: 2019-12-03

## 2019-12-03 VITALS
DIASTOLIC BLOOD PRESSURE: 82 MMHG | HEIGHT: 62 IN | WEIGHT: 199 LBS | SYSTOLIC BLOOD PRESSURE: 136 MMHG | HEART RATE: 89 BPM | BODY MASS INDEX: 36.62 KG/M2

## 2019-12-03 PROCEDURE — 36415 COLL VENOUS BLD VENIPUNCTURE: CPT

## 2019-12-03 PROCEDURE — 99215 OFFICE O/P EST HI 40 MIN: CPT | Mod: 25

## 2019-12-03 NOTE — ASSESSMENT
[FreeTextEntry1] : 1) CKD III in the setting of HTN, DM, gout, NSAID use\par 2) Gout\par 3) HTN\par 4) DM\par 5) Obesity\par \par Pt has had multiple rebounds of sarcoid with SHORT tapers; will need to prolong therapy at higher dose considering this is her third flare within the year;\par On 6 month taper; did 1 month of 40 mg; on 30mg daily Oct 3-Nov 2; then 20mg Nov 3-Dec 2; then 15mg Dec 3-Jan 2; then 5mg daily maintenance \par Seeing rheumatology for gout\par Guidelines below:\par \par Initial therapy — Therapy is usually initiated with oral prednisone at a daily dose of 0.3 to 0.6 mg/kg ideal body weight (usually 20 to 40 mg/day), depending on the severity of disease activity [2,18]. For patients with shortness of breath on exertion and slowly worsening radiographic opacities, the lower dose is usually adequate. For patients with rapidly progressive disease and severe impairment (eg, oxygen dependent), we favor using the higher end of the dose range. \par \par The initial dose is continued for four to six weeks and then the patient is re-assessed. If the symptoms, radiographic abnormalities, and pulmonary function tests (eg, spirometry, diffusing capacity, ambulatory oximetry) are stable or improved, the dose is tapered (see 'Assessing the response' below). Tapering schedules vary, but a common schedule is to decrease by 5 to 10 mg decrements every 4 to 12 weeks down to 0.2 to 0.4 mg/kg (approximately 10 to 15 mg/day). \par \par If the clinical and physiologic parameters are unimproved after four to six weeks at the initial dose, we continue that dose an additional four to six weeks. Most of the improvement with glucocorticoids is apparent in three to four weeks' time [3,19]. (See 'Assessing the response' below.)\par \par High-dose oral glucocorticoid therapy (80 to 100 mg/day) may rarely be warranted in patients with acute respiratory failure or concomitant cardiac, neurologic, ocular, or upper airway disease [20-22]. This level is continued until the disease is under control (usually 4 to 12 weeks). Once improvement is achieved, the dose is tapered as described for the maintenance phase above. (See "Neurologic sarcoidosis" and "Clinical manifestations and diagnosis of cardiac sarcoidosis".)\par \par Maintenance therapy — No formal data are available to guide maintenance dosing of oral glucocorticoids. Based on clinical experience, a maintenance dose of prednisone in the range of 0.25 to 0.4 mg/kg (usually 10 to 15 mg) per day will prevent worsening of disease [2]. Alternate day therapy with prednisone has also been used for maintenance after initial daily therapy to reduce the risk of adverse effects from the glucocorticoids, but few data are available to support its efficacy. Likely, the benefits are comparable to similar doses of daily glucocorticoid therapy [23]. \par \par During the maintenance phase, the patient is reassessed at 4 to 12 week intervals for evidence of symptomatic worsening or development of glucocorticoid-related adverse effects. (See 'Assessing the response' below and 'Adverse effects' below.)\par \par Recurrence of symptoms, such as cough, dyspnea, and chest pain, is common (occurring in approximately 60 percent of patients), so we continue the maintenance dose for at least three to six to months, giving a total treatment period of approximately one year. Frequently, a brief course of higher doses (increases of 10 to 20 mg above the maintenance dose given for two to four weeks) is required to relieve an episode of recurrent symptoms. (See "Glucocorticoid withdrawal".)\par \par The majority of patients are able to discontinue systemic glucocorticoids after one year [24]. However, approximately one-third will develop a relapse and require another course of therapy, and a small number of patients require more long-term or indefinite maintenance therapy to control their symptoms [25]. \par \par Duration of therapy — The proper length of therapy in patients who respond to treatment is not known [4,12,26]. Therapy must be given for at least three to six months to be effective and to reduce the likelihood of relapse. Relapses are frequent following reduction or withdrawal of therapy, ranging from 14 to 74 percent among those with disease of recent onset (=5 years) [3]. Consequently, we usually aim for at least one year of therapy. \par \par Lifelong low dose treatment (=0.25 mg/kg per day, or 0.25 to 0.5 mg/kg on alternate days) may be required by a minority of patients who suffer frequent relapses. 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

## 2019-12-03 NOTE — REVIEW OF SYSTEMS
[Fever] : no fever [Chills] : no chills [Eye Pain] : no eye pain [Loss Of Hearing] : no hearing loss [Earache] : no earache [Red Eyes] : eyes not red [Heart Rate Is Fast] : the heart rate was not fast [Heart Rate Is Slow] : the heart rate was not slow [Shortness Of Breath] : no shortness of breath [Wheezing] : no wheezing [Arthralgias] : no arthralgias [Joint Pain] : no joint pain [Confused] : no confusion [Sleep Disturbances] : no sleep disturbances [Convulsions] : no convulsions [Suicidal] : not suicidal [Easy Bleeding] : no tendency for easy bleeding [Proptosis] : no proptosis [Hot Flashes] : no hot flashes [Easy Bruising] : no tendency for easy bruising [Negative] : Endocrine [de-identified] : psoriasis; LE lesions

## 2019-12-03 NOTE — HISTORY OF PRESENT ILLNESS
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 Patient found to have normal SCr of 0.80 and now presents with SCr of 1.3. Patient reports taking ibuprofen 800mg during gout flares or for arthritis pains. Otherwise feels well; is in no distress. Nonsmoker, nondrinker. No pertinent family history. Referred to me by Dr. Tobias. Pt seen and examined;  hypercalcemic on previous labs. Patient seen and examined; no complaints; on last renal panel found to have Cr 1.3; hypercalcemic; history of sarcoid with numerous pulmonary nodules.Patient seen and examined; on last visit was placed on steroids for sarcoidosis induced hypercalcemia. Has been on steroids for 1 month titrated from 30mg daily down to current of 5mg daily. Feels well; having some LE cramping.\par Pt seen/examined; no complaints; doing well; just saw derm for psoriasis; Patient seen and examined for follow up of hypercalcemia and elevated Cr. Patient was started on prednisone taper by me; with improved Cr and calcium. Has since been off steroids; however was in ER last week for arthritis of knee; given steroids. Pt seen/examined ; appears to be having a sarcoid flare; Ca up to 12; Cr up to 1.37;\par  Pt seen/examined; last Cr 1.49 before starting steroids; was 1.3 with me in office; now on prednisone for a month; was on 40mg for a month; decreased to 30mg for a month, on taper; Nov 3rd going to 20mg;\par \par Current: Pt seen/examined; no complaints; on prednisone 15mg daily starting today; on taper; Ca on last labs normal as was Cr;

## 2019-12-03 NOTE — PHYSICAL EXAM
[General Appearance - In No Acute Distress] : in no acute distress [General Appearance - Alert] : alert [Sclera] : the sclera and conjunctiva were normal [General Appearance - Well Developed] : well developed [General Appearance - Well Nourished] : well nourished [Outer Ear] : the ears and nose were normal in appearance [Hearing Threshold Finger Rub Not [place]] : hearing was normal [Extraocular Movements] : extraocular movements were intact [Neck Cervical Mass (___cm)] : no neck mass was observed [Neck Appearance] : the appearance of the neck was normal [Respiration, Rhythm And Depth] : normal respiratory rhythm and effort [Auscultation Breath Sounds / Voice Sounds] : lungs were clear to auscultation bilaterally [Heart Rate And Rhythm] : heart rate was normal and rhythm regular [Heart Sounds] : normal S1 and S2 [Arterial Pulses Carotid] : carotid pulses were normal with no bruits [Heart Sounds Gallop] : no gallops [Bowel Sounds] : normal bowel sounds [Abdomen Soft] : soft [Abdomen Tenderness] : non-tender [Abnormal Walk] : normal gait [No CVA Tenderness] : no ~M costovertebral angle tenderness [] : no rash [Skin Turgor] : normal skin turgor [Skin Color & Pigmentation] : normal skin color and pigmentation [Cranial Nerves] : cranial nerves 2-12 were intact [Oriented To Time, Place, And Person] : oriented to person, place, and time

## 2019-12-04 ENCOUNTER — RX RENEWAL (OUTPATIENT)
Age: 59
End: 2019-12-04

## 2019-12-04 ENCOUNTER — APPOINTMENT (OUTPATIENT)
Dept: MAMMOGRAPHY | Facility: CLINIC | Age: 59
End: 2019-12-04
Payer: MEDICAID

## 2019-12-04 ENCOUNTER — APPOINTMENT (OUTPATIENT)
Dept: ULTRASOUND IMAGING | Facility: CLINIC | Age: 59
End: 2019-12-04
Payer: MEDICAID

## 2019-12-04 ENCOUNTER — OUTPATIENT (OUTPATIENT)
Dept: OUTPATIENT SERVICES | Facility: HOSPITAL | Age: 59
LOS: 1 days | End: 2019-12-04
Payer: MEDICAID

## 2019-12-04 DIAGNOSIS — Z00.8 ENCOUNTER FOR OTHER GENERAL EXAMINATION: ICD-10-CM

## 2019-12-04 PROCEDURE — 77063 BREAST TOMOSYNTHESIS BI: CPT

## 2019-12-04 PROCEDURE — 76641 ULTRASOUND BREAST COMPLETE: CPT | Mod: 26,50

## 2019-12-04 PROCEDURE — 77063 BREAST TOMOSYNTHESIS BI: CPT | Mod: 26

## 2019-12-04 PROCEDURE — 76641 ULTRASOUND BREAST COMPLETE: CPT

## 2019-12-04 PROCEDURE — 77067 SCR MAMMO BI INCL CAD: CPT | Mod: 26

## 2019-12-04 PROCEDURE — 77067 SCR MAMMO BI INCL CAD: CPT

## 2019-12-10 ENCOUNTER — APPOINTMENT (OUTPATIENT)
Dept: RADIOLOGY | Facility: CLINIC | Age: 59
End: 2019-12-10
Payer: MEDICAID

## 2019-12-10 ENCOUNTER — OUTPATIENT (OUTPATIENT)
Dept: OUTPATIENT SERVICES | Facility: HOSPITAL | Age: 59
LOS: 1 days | End: 2019-12-10
Payer: MEDICAID

## 2019-12-10 DIAGNOSIS — Z00.00 ENCOUNTER FOR GENERAL ADULT MEDICAL EXAMINATION WITHOUT ABNORMAL FINDINGS: ICD-10-CM

## 2019-12-10 LAB
ALBUMIN SERPL ELPH-MCNC: 4.2 G/DL
ANION GAP SERPL CALC-SCNC: 13 MMOL/L
BUN SERPL-MCNC: 34 MG/DL
CALCIUM SERPL-MCNC: 9.8 MG/DL
CHLORIDE SERPL-SCNC: 99 MMOL/L
CO2 SERPL-SCNC: 26 MMOL/L
CREAT SERPL-MCNC: 1.08 MG/DL
GLUCOSE SERPL-MCNC: 137 MG/DL
PHOSPHATE SERPL-MCNC: 2.6 MG/DL
POTASSIUM SERPL-SCNC: 5.2 MMOL/L
SODIUM SERPL-SCNC: 138 MMOL/L

## 2019-12-10 PROCEDURE — 77080 DXA BONE DENSITY AXIAL: CPT

## 2019-12-10 PROCEDURE — 77080 DXA BONE DENSITY AXIAL: CPT | Mod: 26

## 2020-01-09 ENCOUNTER — OTHER (OUTPATIENT)
Age: 60
End: 2020-01-09

## 2020-01-22 ENCOUNTER — APPOINTMENT (OUTPATIENT)
Dept: PULMONOLOGY | Facility: CLINIC | Age: 60
End: 2020-01-22
Payer: MEDICAID

## 2020-01-22 VITALS
SYSTOLIC BLOOD PRESSURE: 112 MMHG | DIASTOLIC BLOOD PRESSURE: 64 MMHG | BODY MASS INDEX: 36.25 KG/M2 | OXYGEN SATURATION: 97 % | HEART RATE: 88 BPM | WEIGHT: 197 LBS | HEIGHT: 62 IN

## 2020-01-22 PROCEDURE — 99215 OFFICE O/P EST HI 40 MIN: CPT

## 2020-01-22 RX ORDER — PREDNISONE 10 MG/1
10 TABLET ORAL
Qty: 30 | Refills: 6 | Status: DISCONTINUED | COMMUNITY
Start: 2019-09-03 | End: 2020-01-22

## 2020-01-22 NOTE — DISCUSSION/SUMMARY
[FreeTextEntry1] : \par #1. CPAP at 6 cm of water to treat moderate to severe WALTER; encouraged compliance\par #2. PFTs performed previously and subsequent spirometry were normal \par #3. Diet and exercise for weight loss\par #4. Repeat chest CT to f/u sarcoidosis reveals improvement from prior; pt is on prednisone for hypercalcemia likely from sarcoidosis; repeat CT in 11/2020 for one year f/u\par #5. F/u in 4-6 months to reassess response to CPAP; encouraged compliance\par #6. Replace equipment as needed; ordered 6/26/19 with [employer] was given to pt to improve compliance\par #7. SOBOE is likely related to weight or deconditioning given normal PFTs\par #8. The patient does not appear to require BD therapy at this time\par #9. Consider dental evaluation for an oral appliance as alternative therapy for treatment of CPAP if clinically indicated\par #10. Consider following ACE and Ca levels per rheumatology\par #11. Rheumatology and renal f/u

## 2020-01-22 NOTE — PHYSICAL EXAM
[General Appearance - Well Developed] : well developed [Normal Appearance] : normal appearance [General Appearance - In No Acute Distress] : no acute distress [Normal Conjunctiva] : the conjunctiva exhibited no abnormalities [Low Lying Soft Palate] : low lying soft palate [Enlarged Base of the Tongue] : enlargement of the base of the tongue [II] : II [Neck Appearance] : the appearance of the neck was normal [Heart Rate And Rhythm] : heart rate and rhythm were normal [Heart Sounds] : normal S1 and S2 [Murmurs] : no murmurs present [Edema] : no peripheral edema present [] : no respiratory distress [Respiration, Rhythm And Depth] : normal respiratory rhythm and effort [Exaggerated Use Of Accessory Muscles For Inspiration] : no accessory muscle use [Auscultation Breath Sounds / Voice Sounds] : lungs were clear to auscultation bilaterally [Abdomen Soft] : soft [Abdomen Tenderness] : non-tender [Nail Clubbing] : no clubbing of the fingernails [Cyanosis, Localized] : no localized cyanosis [No Focal Deficits] : no focal deficits [Oriented To Time, Place, And Person] : oriented to person, place, and time [FreeTextEntry1] : Patient in wheelchair

## 2020-01-22 NOTE — CONSULT LETTER
[Dear  ___] : Dear  [unfilled], [Consult Letter:] : I had the pleasure of evaluating your patient, [unfilled]. [Please see my note below.] : Please see my note below. [Consult Closing:] : Thank you very much for allowing me to participate in the care of this patient.  If you have any questions, please do not hesitate to contact me. [Sincerely,] : Sincerely, [Leland Sims MD] : Leland Sims MD [DrYasmani  ___] : Dr. GASPAR [DrYasmani ___] : Dr. GASPAR [FreeTextEntry3] : Leland Sims MD, FCCP, D. ABSM\par Pulmonary and Sleep Medicine\par [facility] [place]\par \par

## 2020-01-22 NOTE — REVIEW OF SYSTEMS
[Hypertension] : ~T hypertension [Diabetes] : diabetes mellitus [Fever] : no fever [Dry Eyes] : no dryness of the eyes [Chills] : no chills [Eye Irritation] : no ~T irritation of the eyes [Nasal Congestion] : no nasal congestion [Epistaxis] : no nosebleeds [Postnasal Drip] : no postnasal drip [Sinus Problems] : no sinus problems [Sputum] : not coughing up ~M sputum [Cough] : no cough [Dyspnea] : no dyspnea [Chest Tightness] : no chest tightness [Pleuritic Pain] : no pleuritic pain [Wheezing] : no wheezing [Chest Discomfort] : no chest discomfort [Dysrhythmia] : no dysrhythmia [Murmurs] : no murmurs were heard [Palpitations] : no palpitations [Hay Fever] : no hay fever [Edema] : ~T edema was not present [Reflux] : no reflux [Itchy Eyes] : no itching of ~T the eyes [Vomiting] : no vomiting [Nausea] : no nausea [Constipation] : no constipation [Diarrhea] : no diarrhea [Abdominal Pain] : no abdominal pain [Dysuria] : no dysuria [Trauma] : no ~T physical trauma [Fracture] : no fracture [Anemia] : no anemia [Headache] : no headache [Dizziness] : no dizziness [Syncope] : no fainting [Seizures] : no seizures [Numbness] : no numbness [Paralysis] : no paralysis was seen [Depression] : no depression [Anxiety] : no anxiety [Thyroid Problem] : no thyroid problem

## 2020-01-22 NOTE — HISTORY OF PRESENT ILLNESS
[Excess Weight] : excess weight [Dyspnea] : dyspnea [Joint Pain] : joint pain [Low Calorie Diet] : low calorie diet [Fair Compliance] : fair compliance with treatment [Fair Tolerance] : fair tolerance of treatment [Diabetes] : diabetes [Hypertension] : hypertension [High] : high [Low Calorie] : low calorie [Well Balanced Diet] : well balanced meals [Infrequently] : exercises infrequently [Walking] : walking [Follow-Up - Routine Clinic] : a routine clinic follow-up of [Excessive Daytime Sleepiness] : excessive daytime sleepiness [Snoring] : snoring [Unrefreshing Sleep] : unrefreshing sleep [Sleepy When Sedentary] : sleepy when sedentary [Currently Experiencing] : The patient is currently experiencing symptoms. [None] : No associated symptoms are reported [CPAP] : CPAP [Poor Compliance] : poor compliance with treatment [Poor Tolerance] : poor tolerance of treatment [Poor Symptom Control] : poor symptom control [FreeTextEntry1] : The patient recently suffered an ankle injury and was on crutches and a wheelchair but now is walking without support.\par She is followed by hematology for anemia.\par She is on prednisone per rheumatology for hypercalcemia. [Witnessed Gasping During Sleep] : no witnessed gasping during sleep [Witnessed Apnea During Sleep] : no witnessed apnea during sleep [de-identified] : at 6 cm of water

## 2020-01-22 NOTE — ED PROVIDER NOTE - PMH
Addended by: YADIRA DAUGHERTY on: 1/22/2020 01:49 PM     Modules accepted: Orders     DM (diabetes mellitus)    HTN (hypertension)    Psoriasis    Sarcoidosis

## 2020-01-29 ENCOUNTER — APPOINTMENT (OUTPATIENT)
Dept: RHEUMATOLOGY | Facility: CLINIC | Age: 60
End: 2020-01-29

## 2020-03-17 ENCOUNTER — APPOINTMENT (OUTPATIENT)
Dept: NEPHROLOGY | Facility: CLINIC | Age: 60
End: 2020-03-17

## 2020-05-01 ENCOUNTER — OUTPATIENT (OUTPATIENT)
Dept: OUTPATIENT SERVICES | Facility: HOSPITAL | Age: 60
LOS: 1 days | End: 2020-05-01
Payer: MEDICAID

## 2020-05-14 ENCOUNTER — APPOINTMENT (OUTPATIENT)
Dept: ORTHOPEDIC SURGERY | Facility: CLINIC | Age: 60
End: 2020-05-14
Payer: MEDICAID

## 2020-05-14 VITALS
HEART RATE: 94 BPM | BODY MASS INDEX: 35.15 KG/M2 | HEIGHT: 62 IN | WEIGHT: 191 LBS | SYSTOLIC BLOOD PRESSURE: 144 MMHG | DIASTOLIC BLOOD PRESSURE: 84 MMHG

## 2020-05-14 DIAGNOSIS — M25.622 STIFFNESS OF LEFT ELBOW, NOT ELSEWHERE CLASSIFIED: ICD-10-CM

## 2020-05-14 PROCEDURE — 99214 OFFICE O/P EST MOD 30 MIN: CPT

## 2020-05-14 PROCEDURE — 73080 X-RAY EXAM OF ELBOW: CPT | Mod: LT

## 2020-05-14 NOTE — REASON FOR VISIT
[Initial Visit] : an initial visit for [Family Member] : family member [FreeTextEntry2] : left elbow pain.

## 2020-05-14 NOTE — PHYSICAL EXAM
[de-identified] : Physical Exam:\par General: Well appearing, no acute distress\par Neurologic: A&Ox3, No focal deficits\par Head: NCAT without abrasions, lacerations, or ecchymosis to head, face, or scalp\par Eyes: No scleral icterus, no gross abnormalities\par Respiratory: Equal chest wall expansion bilaterally, no accessory muscle use\par Lymphatic: No lymphadenopathy palpated\par Skin: Warm and dry\par Psychiatric: Normal mood and affect\par \par Elbow Exam\par \par Skin: Clean, dry, intact. No warmth or erythema. No ecchymosis. Nonfluctuant swelling noted to medial and lateral aspect . No palpable joint effusion.\par ROM: RIGHT 0-140, full supination/pronation.  LEFT -, decreased supination/pronation due to pain.\par Painful ROM: Flexion/Supination/Pronation to lateral right elbow\par Tenderness: [No] medial epicondyle pain. Lateral epicondyle pain. [No] olecranon pain. No pain at radial head.\par Strength: 4/5 4elbow flexion, 4/5 elbow extension, 4/5 supination, 4/5 pronation\par Stability: Stable to vaus/valgus stress\par Vasc: 2+ radial pulse, <2s cap refill\par Sensation: In tact to light touch throughout\par Neuro: Negative tinels at ulnar canal, AIN/PIN/Ulnar nerve in tact to motor/sensation.\par \par Special Tests:\par Dorsiflexion Against Resistance: Negative\par Flexion of Wrist Against Resistance: POS\par Resistance Against Pronation: POS\par Resistance Against Supination: Negative\par Tinel's Sign Cubital Tunnel: Negative

## 2020-05-14 NOTE — REVIEW OF SYSTEMS
[Joint Pain] : joint pain [Joint Stiffness] : joint stiffness [Sleep Disturbances] : ~T sleep disturbances [Constipation] : constipation [Negative] : Heme/Lymph [FreeTextEntry9] : left elbow

## 2020-05-14 NOTE — HISTORY OF PRESENT ILLNESS
[___ mths] : [unfilled] month(s) ago [8] : a current pain level of 8/10 [Constant] : ~He/She~ states the symptoms seem to be constant [de-identified] : RADHA is a 60 year old female who presents today for initial evaluation of left elbow pain. She has history of gout, sarcoidosis, RA, and CKD. She was on allopurinol for gout but had an allergic rxn and had to stop it. She was then put on a new gout medication that her nephrologist said was bad for her kidneys so she is not taking anything to prevent gouty flares. She also is not on any medications for her RA due to worry of further immunosuppression. She comes in today with acute onset left elbow pain, stiffness, and swelling that started out of no where x 1 month ago and has worsened since. She denies redness or warmth.\par

## 2020-05-14 NOTE — DISCUSSION/SUMMARY
[de-identified] : RADHA is a 60 year old female who presents today for initial evaluation of left elbow pain. She has history of gout, sarcoidosis, RA, and CKD. She was on allopurinal for gout but had an allergic rxn and had to stop it. She was then put on a new gout medication that her nephrologist said was bad for her kidneys so she is not taking anything to prevent gouty flares. She also is not on any medications for her RA due to worry of further immunosuppression. She comes in today with acute onset left elbow pain, stiffness, and swelling that started out of no where x 1 month ago and has worsened since. She denies redness or warmth.\par \par Radiographs performed today were reviewed with the patient and reveal no bony lesions or fracture without dislocation. A soft tissue mass noted over olecranon likely gouty flare. Patient to contact her RA doctor in regards to this. Due to no fluctuant effusion, unable to aspirate. Patient may ice the area to comfort and discuss with her PCP/ Nephrologist the best course of pain medication since she is unable to take NSAIDs due to CKD.  Due to her significant decrease in ROM and mass noted on radiographs an MRI is indicated to further evaluate. She will call us in 1 week to assure we have the results to discuss them with her through a Telehealth igor. She agrees with the above plan and all questions were answered.\par

## 2020-05-18 ENCOUNTER — INPATIENT (INPATIENT)
Facility: HOSPITAL | Age: 60
LOS: 1 days | Discharge: HOME | DRG: 684 | End: 2020-05-20
Attending: HOSPITALIST | Admitting: HOSPITALIST
Payer: COMMERCIAL

## 2020-05-18 VITALS
TEMPERATURE: 98 F | RESPIRATION RATE: 18 BRPM | WEIGHT: 190.04 LBS | HEART RATE: 92 BPM | DIASTOLIC BLOOD PRESSURE: 87 MMHG | SYSTOLIC BLOOD PRESSURE: 135 MMHG | HEIGHT: 62 IN | OXYGEN SATURATION: 98 %

## 2020-05-18 DIAGNOSIS — E83.52 HYPERCALCEMIA: ICD-10-CM

## 2020-05-18 LAB
24R-OH-CALCIDIOL SERPL-MCNC: 63.2 NG/ML — SIGNIFICANT CHANGE UP (ref 30–80)
25(OH)D3 SERPL-MCNC: 59 NG/ML
ALBUMIN SERPL ELPH-MCNC: 3.8 G/DL — SIGNIFICANT CHANGE UP (ref 3.3–5.2)
ALBUMIN SERPL ELPH-MCNC: 4.1 G/DL
ALP SERPL-CCNC: 48 U/L — SIGNIFICANT CHANGE UP (ref 40–120)
ALT FLD-CCNC: 9 U/L — SIGNIFICANT CHANGE UP
ANION GAP SERPL CALC-SCNC: 15 MMOL/L
ANION GAP SERPL CALC-SCNC: 16 MMOL/L — SIGNIFICANT CHANGE UP (ref 5–17)
APPEARANCE UR: CLEAR — SIGNIFICANT CHANGE UP
APPEARANCE: ABNORMAL
AST SERPL-CCNC: 17 U/L — SIGNIFICANT CHANGE UP
BACTERIA # UR AUTO: ABNORMAL
BACTERIA: NEGATIVE
BASOPHILS # BLD AUTO: 0.03 K/UL — SIGNIFICANT CHANGE UP (ref 0–0.2)
BASOPHILS # BLD AUTO: 0.05 K/UL
BASOPHILS NFR BLD AUTO: 0.4 % — SIGNIFICANT CHANGE UP (ref 0–2)
BASOPHILS NFR BLD AUTO: 0.8 %
BILIRUB SERPL-MCNC: 0.2 MG/DL — LOW (ref 0.4–2)
BILIRUB UR-MCNC: NEGATIVE — SIGNIFICANT CHANGE UP
BILIRUBIN URINE: NEGATIVE
BLOOD URINE: NEGATIVE
BUN SERPL-MCNC: 44 MG/DL
BUN SERPL-MCNC: 50 MG/DL — HIGH (ref 8–20)
CALCIUM SERPL-MCNC: 14.9 MG/DL
CALCIUM SERPL-MCNC: 14.9 MG/DL
CALCIUM SERPL-MCNC: 14.9 MG/DL — CRITICAL HIGH (ref 8.4–10.5)
CALCIUM SERPL-MCNC: 15 MG/DL — CRITICAL HIGH (ref 8.6–10.2)
CHLORIDE SERPL-SCNC: 100 MMOL/L
CHLORIDE SERPL-SCNC: 101 MMOL/L — SIGNIFICANT CHANGE UP (ref 98–107)
CHLORIDE UR-SCNC: 45 MMOL/L — SIGNIFICANT CHANGE UP
CO2 SERPL-SCNC: 23 MMOL/L — SIGNIFICANT CHANGE UP (ref 22–29)
CO2 SERPL-SCNC: 24 MMOL/L
COD CRY URNS QL: ABNORMAL
COLOR SPEC: YELLOW — SIGNIFICANT CHANGE UP
COLOR: NORMAL
CREAT ?TM UR-MCNC: 90 MG/DL — SIGNIFICANT CHANGE UP
CREAT SERPL-MCNC: 3.37 MG/DL
CREAT SERPL-MCNC: 3.68 MG/DL — HIGH (ref 0.5–1.3)
CREAT SPEC-SCNC: 89 MG/DL
CREAT/PROT UR: 0.6 RATIO
DIFF PNL FLD: ABNORMAL
EOSINOPHIL # BLD AUTO: 0.16 K/UL — SIGNIFICANT CHANGE UP (ref 0–0.5)
EOSINOPHIL # BLD AUTO: 0.28 K/UL
EOSINOPHIL NFR BLD AUTO: 2.4 % — SIGNIFICANT CHANGE UP (ref 0–6)
EOSINOPHIL NFR BLD AUTO: 4.4 %
EPI CELLS # UR: SIGNIFICANT CHANGE UP
GLUCOSE QUALITATIVE U: NEGATIVE
GLUCOSE SERPL-MCNC: 92 MG/DL — SIGNIFICANT CHANGE UP (ref 70–99)
GLUCOSE SERPL-MCNC: 96 MG/DL
GLUCOSE UR QL: NEGATIVE MG/DL — SIGNIFICANT CHANGE UP
HCT VFR BLD CALC: 29.4 %
HCT VFR BLD CALC: 30.5 % — LOW (ref 34.5–45)
HGB BLD-MCNC: 9 G/DL
HGB BLD-MCNC: 9.6 G/DL — LOW (ref 11.5–15.5)
HYALINE CASTS: 0 /LPF
IMM GRANULOCYTES NFR BLD AUTO: 0.4 % — SIGNIFICANT CHANGE UP (ref 0–1.5)
IMM GRANULOCYTES NFR BLD AUTO: 0.5 %
KETONES UR-MCNC: NEGATIVE — SIGNIFICANT CHANGE UP
KETONES URINE: NEGATIVE
LEUKOCYTE ESTERASE UR-ACNC: ABNORMAL
LEUKOCYTE ESTERASE URINE: NEGATIVE
LYMPHOCYTES # BLD AUTO: 0.92 K/UL — LOW (ref 1–3.3)
LYMPHOCYTES # BLD AUTO: 1.15 K/UL
LYMPHOCYTES # BLD AUTO: 13.7 % — SIGNIFICANT CHANGE UP (ref 13–44)
LYMPHOCYTES NFR BLD AUTO: 18.3 %
MAGNESIUM SERPL-MCNC: 1.6 MG/DL
MAN DIFF?: NORMAL
MCHC RBC-ENTMCNC: 25.8 PG
MCHC RBC-ENTMCNC: 26.2 PG — LOW (ref 27–34)
MCHC RBC-ENTMCNC: 30.6 GM/DL
MCHC RBC-ENTMCNC: 31.5 GM/DL — LOW (ref 32–36)
MCV RBC AUTO: 83.1 FL — SIGNIFICANT CHANGE UP (ref 80–100)
MCV RBC AUTO: 84.2 FL
MICROSCOPIC-UA: NORMAL
MONOCYTES # BLD AUTO: 0.65 K/UL — SIGNIFICANT CHANGE UP (ref 0–0.9)
MONOCYTES # BLD AUTO: 0.68 K/UL
MONOCYTES NFR BLD AUTO: 10.8 %
MONOCYTES NFR BLD AUTO: 9.7 % — SIGNIFICANT CHANGE UP (ref 2–14)
NEUTROPHILS # BLD AUTO: 4.11 K/UL
NEUTROPHILS # BLD AUTO: 4.91 K/UL — SIGNIFICANT CHANGE UP (ref 1.8–7.4)
NEUTROPHILS NFR BLD AUTO: 65.2 %
NEUTROPHILS NFR BLD AUTO: 73.4 % — SIGNIFICANT CHANGE UP (ref 43–77)
NITRITE UR-MCNC: NEGATIVE — SIGNIFICANT CHANGE UP
NITRITE URINE: NEGATIVE
OSMOLALITY SERPL: 332 MOSMOL/KG — HIGH (ref 280–301)
OSMOLALITY UR: 376 MOSM/KG — SIGNIFICANT CHANGE UP (ref 300–1000)
PARATHYROID HORMONE INTACT: 17 PG/ML
PH UR: 6 — SIGNIFICANT CHANGE UP (ref 5–8)
PH URINE: 6
PHOSPHATE SERPL-MCNC: 3.9 MG/DL
PHOSPHATE SERPL-MCNC: 4 MG/DL — SIGNIFICANT CHANGE UP (ref 2.4–4.7)
PLATELET # BLD AUTO: 336 K/UL — SIGNIFICANT CHANGE UP (ref 150–400)
PLATELET # BLD AUTO: 337 K/UL
POTASSIUM SERPL-MCNC: 4.3 MMOL/L — SIGNIFICANT CHANGE UP (ref 3.5–5.3)
POTASSIUM SERPL-SCNC: 4.3 MMOL/L
POTASSIUM SERPL-SCNC: 4.3 MMOL/L — SIGNIFICANT CHANGE UP (ref 3.5–5.3)
POTASSIUM UR-SCNC: 39 MMOL/L — SIGNIFICANT CHANGE UP
PROT SERPL-MCNC: 8.6 G/DL — SIGNIFICANT CHANGE UP (ref 6.6–8.7)
PROT UR-MCNC: 30 MG/DL
PROT UR-MCNC: 50 MG/DL
PROTEIN URINE: ABNORMAL
PTH-INTACT FLD-MCNC: 17 PG/ML — SIGNIFICANT CHANGE UP (ref 15–65)
RBC # BLD: 3.49 M/UL
RBC # BLD: 3.67 M/UL — LOW (ref 3.8–5.2)
RBC # FLD: 14 % — SIGNIFICANT CHANGE UP (ref 10.3–14.5)
RBC # FLD: 14.5 %
RBC CASTS # UR COMP ASSIST: SIGNIFICANT CHANGE UP /HPF (ref 0–4)
RED BLOOD CELLS URINE: 3 /HPF
SODIUM SERPL-SCNC: 140 MMOL/L
SODIUM SERPL-SCNC: 140 MMOL/L — SIGNIFICANT CHANGE UP (ref 135–145)
SODIUM UR-SCNC: 51 MMOL/L — SIGNIFICANT CHANGE UP
SP GR SPEC: 1.01 — SIGNIFICANT CHANGE UP (ref 1.01–1.02)
SPECIFIC GRAVITY URINE: 1.01
SQUAMOUS EPITHELIAL CELLS: 6 /HPF
UROBILINOGEN FLD QL: NEGATIVE MG/DL — SIGNIFICANT CHANGE UP
UROBILINOGEN URINE: NORMAL
WBC # BLD: 6.7 K/UL — SIGNIFICANT CHANGE UP (ref 3.8–10.5)
WBC # FLD AUTO: 6.3 K/UL
WBC # FLD AUTO: 6.7 K/UL — SIGNIFICANT CHANGE UP (ref 3.8–10.5)
WBC UR QL: SIGNIFICANT CHANGE UP
WHITE BLOOD CELLS URINE: 9 /HPF

## 2020-05-18 PROCEDURE — 71046 X-RAY EXAM CHEST 2 VIEWS: CPT | Mod: 26

## 2020-05-18 PROCEDURE — 93010 ELECTROCARDIOGRAM REPORT: CPT

## 2020-05-18 PROCEDURE — 71250 CT THORAX DX C-: CPT | Mod: 26

## 2020-05-18 PROCEDURE — 99285 EMERGENCY DEPT VISIT HI MDM: CPT

## 2020-05-18 PROCEDURE — 99233 SBSQ HOSP IP/OBS HIGH 50: CPT

## 2020-05-18 PROCEDURE — 99223 1ST HOSP IP/OBS HIGH 75: CPT

## 2020-05-18 RX ORDER — CALCITONIN SALMON 200 [IU]/ML
340 INJECTION, SOLUTION INTRAMUSCULAR EVERY 12 HOURS
Refills: 0 | Status: COMPLETED | OUTPATIENT
Start: 2020-05-18 | End: 2020-05-18

## 2020-05-18 RX ORDER — CYCLOBENZAPRINE HYDROCHLORIDE 10 MG/1
10 TABLET, FILM COATED ORAL THREE TIMES A DAY
Refills: 0 | Status: DISCONTINUED | OUTPATIENT
Start: 2020-05-18 | End: 2020-05-20

## 2020-05-18 RX ORDER — SODIUM CHLORIDE 9 MG/ML
1000 INJECTION INTRAMUSCULAR; INTRAVENOUS; SUBCUTANEOUS ONCE
Refills: 0 | Status: COMPLETED | OUTPATIENT
Start: 2020-05-18 | End: 2020-05-18

## 2020-05-18 RX ORDER — SODIUM CHLORIDE 9 MG/ML
1000 INJECTION INTRAMUSCULAR; INTRAVENOUS; SUBCUTANEOUS
Refills: 0 | Status: DISCONTINUED | OUTPATIENT
Start: 2020-05-18 | End: 2020-05-19

## 2020-05-18 RX ORDER — HEPARIN SODIUM 5000 [USP'U]/ML
5000 INJECTION INTRAVENOUS; SUBCUTANEOUS EVERY 12 HOURS
Refills: 0 | Status: DISCONTINUED | OUTPATIENT
Start: 2020-05-18 | End: 2020-05-20

## 2020-05-18 RX ORDER — SODIUM CHLORIDE 9 MG/ML
1000 INJECTION INTRAMUSCULAR; INTRAVENOUS; SUBCUTANEOUS
Refills: 0 | Status: DISCONTINUED | OUTPATIENT
Start: 2020-05-18 | End: 2020-05-18

## 2020-05-18 RX ADMIN — SODIUM CHLORIDE 1000 MILLILITER(S): 9 INJECTION INTRAMUSCULAR; INTRAVENOUS; SUBCUTANEOUS at 17:35

## 2020-05-18 RX ADMIN — SODIUM CHLORIDE 150 MILLILITER(S): 9 INJECTION INTRAMUSCULAR; INTRAVENOUS; SUBCUTANEOUS at 20:04

## 2020-05-18 RX ADMIN — SODIUM CHLORIDE 1000 MILLILITER(S): 9 INJECTION INTRAMUSCULAR; INTRAVENOUS; SUBCUTANEOUS at 15:58

## 2020-05-18 RX ADMIN — CALCITONIN SALMON 340 INTERNATIONAL UNIT(S): 200 INJECTION, SOLUTION INTRAMUSCULAR at 18:29

## 2020-05-18 RX ADMIN — Medication 40 MILLIGRAM(S): at 20:37

## 2020-05-18 RX ADMIN — SODIUM CHLORIDE 1000 MILLILITER(S): 9 INJECTION INTRAMUSCULAR; INTRAVENOUS; SUBCUTANEOUS at 19:07

## 2020-05-18 RX ADMIN — SODIUM CHLORIDE 1000 MILLILITER(S): 9 INJECTION INTRAMUSCULAR; INTRAVENOUS; SUBCUTANEOUS at 17:57

## 2020-05-18 RX ADMIN — CALCITONIN SALMON 340 INTERNATIONAL UNIT(S): 200 INJECTION, SOLUTION INTRAMUSCULAR at 19:07

## 2020-05-18 NOTE — ED STATDOCS - PMH
Patient seen in real time with LPN staff for telegenetic counseling visit with remote genetic counselor.   DM (diabetes mellitus)    HTN (hypertension)    Psoriasis    Sarcoidosis

## 2020-05-18 NOTE — H&P ADULT - HISTORY OF PRESENT ILLNESS
60 yr female with history of sarcoidosis 60 yr female with history of sarcoidosis, psoriasis, DM, Hypertension.  Said she was not feeling well for the past one week notably tired and poor po intake. Had gone to see nephrology who ordered labs. Call to go to ED today for abnormal lab report Ca 14.9 and Cr 3.3 up from baseline of 1.0  Had complete steroid taper 2 months prior for rheumatologic disease flare up.  In the ED Serum Ca 15.0 , BUN/Cr 50/3.68  CXR scattered jason pulm nodules and thoracic lympadenopathy compartible with pulm sarcoid.

## 2020-05-18 NOTE — ED PROVIDER NOTE - PROGRESS NOTE DETAILS
D/w Dr. Maria- would like to give 2nd 1L NS bolus, start IVF @200cc/hr, calcitonin 4U/kg every 12 hours x 2 doses. Will reassess in AM. WIll admit patient to Dr. Lunsford. Luzma Vidal DO

## 2020-05-18 NOTE — CONSULT NOTE ADULT - ASSESSMENT
1) Hypercalcemia  2) Acute kidney injury  3) Sarcoidosis 1) Hypercalcemia  2) Acute kidney injury  3) Sarcoidosis with pulmonary nodules    H/o hypercalcemia previously attributed to Sarcoidosis; tapered off steroids  Sca++ 14.9 on recent OP labs--> 15.0 in ED; likely flare up of sarcoidosis  Also with moisés; baseline Scr ~ 1.0; Scr 3.68 now- likely in setting of dehydration from hypercalcemia  s/p 2 L NS bolus; start NS at 150 cc/hr  Monitor for volume overload; may give Lasix 40 mg IV prn  Give Calcitonin 4 IU / kg x 2 doses 12 hours apart  Start Prednisone 40 mg daily empirically  Obtain iPTH, 25-OH vitamin D, 1-25 (OH)2 vitamin D, PTHrP, ACE, LDH , free light chains and SPEP  UA, urine lytes, TP/cr ratio  Bladder scan  Renal/ bladder US  Obtain CT chest  Monitor Scr, lytes, UOP  Strict I/O's 1) Hypercalcemia  2) Acute kidney injury  3) Sarcoidosis with pulmonary nodules    H/o hypercalcemia previously attributed to Sarcoidosis; tapered off steroids  Sca++ 14.9 on recent OP labs--> 15.0 in ED; likely flare up of sarcoidosis  Also with moisés; baseline Scr ~ 1.0; Scr 3.68 now- likely in setting of dehydration from hypercalcemia  s/p 2 L NS bolus; start NS at 150 cc/hr  Monitor for volume overload; may give Lasix 40 mg IV prn  Give Calcitonin 4 IU / kg x 2 doses 12 hours apart  Start Prednisone 40 mg daily empirically  Obtain iPTH, 25-OH vitamin D, 1-25 (OH)2 vitamin D, PTHrP, ACE, LDH , free light chains and SPEP  UA, urine lytes, TP/cr ratio  Bladder scan  Renal/ bladder US  Obtain CT chest  Hold Metformin, ACE-I  Monitor Scr, lytes, UOP  Strict I/O's 1) Hypercalcemia  2) Acute kidney injury  3) Sarcoidosis with pulmonary nodules    H/o hypercalcemia previously attributed to Sarcoidosis; tapered off steroids  Sca++ 14.9 on recent OP labs--> 15.0 in ED; ? flare up of sarcoidosis; pt also on high dose Vitamin D   Also with moisés; baseline Scr ~ 1.0; Scr 3.68 now- likely in setting of dehydration from hypercalcemia  s/p 2 L NS bolus; start NS at 150 cc/hr  Monitor for volume overload; may give Lasix 40 mg IV prn  Give Calcitonin 4 IU / kg x 2 doses 12 hours apart  Start Prednisone 40 mg daily empirically  Obtain iPTH, 25-OH vitamin D, 1-25 (OH)2 vitamin D, PTHrP, ACE, LDH , free light chains and SPEP  UA, urine lytes, TP/cr ratio  Bladder scan  Renal/ bladder US  Obtain CT chest  Hold Metformin, ACE-I and vitamin D   Monitor Scr, lytes, UOP  Strict I/O's

## 2020-05-18 NOTE — ED ADULT TRIAGE NOTE - CHIEF COMPLAINT QUOTE
patient was called back from lab for abnormal kidney functions, and calcium, patient has been feeling weak and palpitations for 2 days, Hx of Sarcoidosis, DM, Kidney Function failure.

## 2020-05-18 NOTE — ED ADULT NURSE REASSESSMENT NOTE - NS ED NURSE REASSESS COMMENT FT1
Pt received in stable condition, pt is aao x4, speaks in complete sentences, breathes spontaneous on room air and has no apparent respiratory distress. pt is ambulatory, I, has ID band in place attached to monitor. Pt is admitted and pending bed assignment. Safety maintained. Will continue to monitor.

## 2020-05-18 NOTE — ED ADULT NURSE NOTE - OBJECTIVE STATEMENT
60 year old female a&xo3 comes to ED sent for abnormal blood work, pt. states her kidney function and calcium were abnormal. pt. denies pain or discomfort. pt. states she has had a non productive cough for awhile. pt. denies diarrhea, vomiting, chest pain, sob, fevers, abdominal pain. pt. in no apparent distress at this time, breathing even and unlabored.

## 2020-05-18 NOTE — ED PROVIDER NOTE - OBJECTIVE STATEMENT
61yo female PMH DM, HTN, sarcoidosis presenting with abnormal lab result. Patient states that she was tapered off of steroids 2 months ago. For last week, she has noticed that she is more lethargic than usual, not wanting to perform ADL's. Made apt with nephrologist who sent her for labs, found to have calcium of 14.9 and creatinine 3.3, sent to ED for further evaluation. No abdominal pain, nausea, or vomiting. Patient states she is urinating normally but had episode of foamy urine 1 week ago that has since resolved.

## 2020-05-18 NOTE — H&P ADULT - PROBLEM SELECTOR PLAN 2
Cr up to 3.68 from previous baseline of 1.0 at previous labs  Cautious rehydration with iv fluid saline.

## 2020-05-18 NOTE — ED STATDOCS - PROGRESS NOTE DETAILS
59 y/o F pt with significant PMHx of DM, HTN, kidney function failure and sarcoidosis presents to the ED c/o abnormal lab results with elevated calcium. She also notes that her kidney function is decreased due to sarcoidosis. Sent in for worsening renal function and hypercalcemia. Focused eval, protocol orders entered. Pt to be moved to main ED for further evaluation by another provider. 61 y/o F pt with significant PMHx of DM, HTN, kidney function failure and sarcoidosis presents to the ED c/o abnormal lab results with elevated calcium. She also notes that her kidney function is decreased due to sarcoidosis. Sent in for worsening renal function and hypercalcemia. Pt's kidney Dr. Maria has been paged. Focused eval, protocol orders entered. Pt to be moved to main ED for further evaluation by another provider.

## 2020-05-18 NOTE — ED PROVIDER NOTE - PHYSICAL EXAMINATION
Gen: NAD, AOx3  Head: NCAT  Lung: CTAB, no respiratory distress, no wheezing, rales, rhonchi  CV: normal s1/s2, rrr, no murmurs, Normal perfusion, pulses 2+ throughout  Abd: soft, NTND  MSK: No edema, no visible deformities, full range of motion in all 4 extremities  Neuro: No focal neurologic deficits  Skin: No rash   Psych: normal affect

## 2020-05-18 NOTE — CONSULT NOTE ADULT - SUBJECTIVE AND OBJECTIVE BOX
[facility] DIVISION OF KIDNEY DISEASES AND HYPERTENSION -- INITIAL CONSULT NOTE  --------------------------------------------------------------------------------  HPI:   59yo female pt with PMH of DM, HTN, sarcoidosis sent in from our office for abnormal labs. Pt was previously seeing Dr. Cheng; was on prednisone for hypercalcemia which was tapered off two months ago. She was also started on high dose vitamin D at that time. She was scheduled to see me on June 9th in nephrology clinic and had labs done prior which revealed SCa++ of 14.9 and acute kidney injury with creatinine 3.3. Pt was then directed to go to ED. Pt seen and examined in ED. Daughter at bedside. Pt c/o feeling tired, also reports decreased appetite and constipation.       PAST HISTORY  --------------------------------------------------------------------------------  PAST MEDICAL & SURGICAL HISTORY:  Sarcoidosis  DM (diabetes mellitus)  HTN (hypertension)  Psoriasis  No significant past surgical history    FAMILY HISTORY: non contributory    PAST SOCIAL HISTORY: , lives at home  ALLERGIES & MEDICATIONS  --------------------------------------------------------------------------------  Allergies    Aspirin Adult Low Strength (Swelling)  penicillins (Swelling)    Intolerances      Standing Inpatient Medications  calcitonin Injectable 340 International Unit(s) IntraMuscular every 12 hours  sodium chloride 0.9%. 1000 milliLiter(s) IV Continuous <Continuous>    PRN Inpatient Medications      REVIEW OF SYSTEMS  --------------------------------------------------------------------------------  Gen: No weight changes, fatigue+, no fevers/chills  Skin: No rashes  Head/Eyes/Ears/Mouth: No headache; Normal hearing; Normal vision w/o blurriness; No sinus pain/discomfort, sore throat  Respiratory: No dyspnea, cough, wheezing, hemoptysis  CV: No chest pain, PND, orthopnea  GI: No abdominal pain, diarrhea, constipation+ ,no nausea, vomiting, melena, hematochezia  : No increased frequency, dysuria, hematuria, nocturia  MSK: No joint pain/swelling; no back pain; no edema  Neuro: No dizziness/lightheadedness, weakness  Heme: No easy bruising or bleeding  Endo: No heat/cold intolerance  Psych: No significant nervousness, anxiety, stress, depression    All other systems were reviewed and are negative, except as noted.    VITALS/PHYSICAL EXAM  --------------------------------------------------------------------------------  T(C): 36.8 (05-18-20 @ 18:59), Max: 36.8 (05-18-20 @ 14:08)  HR: 75 (05-18-20 @ 18:59) (75 - 92)  BP: 149/79 (05-18-20 @ 18:59) (135/87 - 149/79)  RR: 18 (05-18-20 @ 18:59) (18 - 18)  SpO2: 95% (05-18-20 @ 18:59) (95% - 98%)  Wt(kg): --  Height (cm): 157.48 (05-18-20 @ 14:08)  Weight (kg): 86.2 (05-18-20 @ 14:08)  BMI (kg/m2): 34.8 (05-18-20 @ 14:08)  BSA (m2): 1.87 (05-18-20 @ 14:08)      Physical Exam:  	Gen: NAD  	HEENT: PERRL, supple neck  	Pulm: CTA B/L  	CV: RRR, S1S2; no rub  	Back: No spinal or CVA tenderness; no sacral edema  	Abd: +BS, soft, nontender/nondistended  	: No suprapubic tenderness  	UE: Warm, no edema; no asterixis  	LE: Warm,  no edema  	Neuro: No focal deficits  	Psych: Normal affect and mood  	Skin: Warm, pink rash on legs  	Vascular access:    LABS/STUDIES  --------------------------------------------------------------------------------              9.6    6.70  >-----------<  336      [05-18-20 @ 16:42]              30.5     140  |  101  |  50.0  ----------------------------<  92      [05-18-20 @ 16:41]  4.3   |  23.0  |  3.68        Ca     15.0     [05-18-20 @ 16:41]      Phos  4.0     [05-18-20 @ 16:41]    TPro  8.6  /  Alb  3.8  /  TBili  0.2  /  DBili  x   /  AST  17  /  ALT  9   /  AlkPhos  48  [05-18-20 @ 16:41]        Serum Osmolality 332      [05-18-20 @ 16:39]    Creatinine Trend:  SCr 3.68 [05-18 @ 16:41]    Urinalysis - [05-18-20 @ 16:47]      Color Yellow / Appearance Clear / SG 1.015 / pH 6.0      Gluc Negative / Ketone Negative  / Bili Negative / Urobili Negative       Blood Trace / Protein 30 / Leuk Est Trace / Nitrite Negative      RBC 0-2 / WBC 3-5 / Hyaline  / Gran  / Sq Epi  / Non Sq Epi Occasional / Bacteria Few    Urine Creatinine 90      [05-18-20 @ 16:46]  Urine Sodium 51      [05-18-20 @ 16:46]  Urine Potassium 39      [05-18-20 @ 16:46]  Urine Chloride 45      [05-18-20 @ 16:46]  Urine Osmolality 376      [05-18-20 @ 16:45] [facility] DIVISION OF KIDNEY DISEASES AND HYPERTENSION -- INITIAL CONSULT NOTE  --------------------------------------------------------------------------------  HPI:   61yo female pt with PMH of DM, HTN, sarcoidosis with numerous pulmonary nodules, history of hypercalcemia on steroids, ? 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 Pt was previously seeing Dr. Cheng; was on prednisone for hypercalcemia which was tapered off two months ago by rheum. She was also started on high dose vitamin D at that time. Pt was scheduled to see me on June 9th in nephrology clinic and had labs done prior which revealed SCa++ of 14.9 and acute kidney injury with creatinine 3.3. Pt was then directed to go to ED. Pt seen and examined in ED. Daughter at bedside. Pt c/o feeling tired, also reports decreased appetite and constipation.       PAST HISTORY  --------------------------------------------------------------------------------  PAST MEDICAL & SURGICAL HISTORY:  Sarcoidosis  DM (diabetes mellitus)  HTN (hypertension)  Psoriasis  No significant past surgical history    FAMILY HISTORY: non contributory    PAST SOCIAL HISTORY: , lives at home  ALLERGIES & MEDICATIONS  --------------------------------------------------------------------------------  Allergies    Aspirin Adult Low Strength (Swelling)  penicillins (Swelling)    Intolerances      Standing Inpatient Medications  calcitonin Injectable 340 International Unit(s) IntraMuscular every 12 hours  sodium chloride 0.9%. 1000 milliLiter(s) IV Continuous <Continuous>    PRN Inpatient Medications      REVIEW OF SYSTEMS  --------------------------------------------------------------------------------  Gen: No weight changes, fatigue+, no fevers/chills  Skin: No rashes  Head/Eyes/Ears/Mouth: No headache; Normal hearing; Normal vision w/o blurriness; No sinus pain/discomfort, sore throat  Respiratory: No dyspnea, cough, wheezing, hemoptysis  CV: No chest pain, PND, orthopnea  GI: No abdominal pain, diarrhea, constipation+ ,no nausea, vomiting, melena, hematochezia  : No increased frequency, dysuria, hematuria, nocturia  MSK: No joint pain/swelling; no back pain; no edema  Neuro: No dizziness/lightheadedness, weakness  Heme: No easy bruising or bleeding  Endo: No heat/cold intolerance  Psych: No significant nervousness, anxiety, stress, depression    All other systems were reviewed and are negative, except as noted.    VITALS/PHYSICAL EXAM  --------------------------------------------------------------------------------  T(C): 36.8 (05-18-20 @ 18:59), Max: 36.8 (05-18-20 @ 14:08)  HR: 75 (05-18-20 @ 18:59) (75 - 92)  BP: 149/79 (05-18-20 @ 18:59) (135/87 - 149/79)  RR: 18 (05-18-20 @ 18:59) (18 - 18)  SpO2: 95% (05-18-20 @ 18:59) (95% - 98%)  Wt(kg): --  Height (cm): 157.48 (05-18-20 @ 14:08)  Weight (kg): 86.2 (05-18-20 @ 14:08)  BMI (kg/m2): 34.8 (05-18-20 @ 14:08)  BSA (m2): 1.87 (05-18-20 @ 14:08)      Physical Exam:  	Gen: NAD  	HEENT: PERRL, supple neck  	Pulm: CTA B/L  	CV: RRR, S1S2; no rub  	Back: No spinal or CVA tenderness; no sacral edema  	Abd: +BS, soft, nontender/nondistended  	: No suprapubic tenderness  	UE: Warm, no edema; no asterixis  	LE: Warm,  no edema  	Neuro: No focal deficits  	Psych: Normal affect and mood  	Skin: Warm, pink rash on legs  	Vascular access:    LABS/STUDIES  --------------------------------------------------------------------------------              9.6    6.70  >-----------<  336      [05-18-20 @ 16:42]              30.5     140  |  101  |  50.0  ----------------------------<  92      [05-18-20 @ 16:41]  4.3   |  23.0  |  3.68        Ca     15.0     [05-18-20 @ 16:41]      Phos  4.0     [05-18-20 @ 16:41]    TPro  8.6  /  Alb  3.8  /  TBili  0.2  /  DBili  x   /  AST  17  /  ALT  9   /  AlkPhos  48  [05-18-20 @ 16:41]        Serum Osmolality 332      [05-18-20 @ 16:39]    Creatinine Trend:  SCr 3.68 [05-18 @ 16:41]    Urinalysis - [05-18-20 @ 16:47]      Color Yellow / Appearance Clear / SG 1.015 / pH 6.0      Gluc Negative / Ketone Negative  / Bili Negative / Urobili Negative       Blood Trace / Protein 30 / Leuk Est Trace / Nitrite Negative      RBC 0-2 / WBC 3-5 / Hyaline  / Gran  / Sq Epi  / Non Sq Epi Occasional / Bacteria Few    Urine Creatinine 90      [05-18-20 @ 16:46]  Urine Sodium 51      [05-18-20 @ 16:46]  Urine Potassium 39      [05-18-20 @ 16:46]  Urine Chloride 45      [05-18-20 @ 16:46]  Urine Osmolality 376      [05-18-20 @ 16:45] [facility] DIVISION OF KIDNEY DISEASES AND HYPERTENSION -- INITIAL CONSULT NOTE  --------------------------------------------------------------------------------  HPI:   59yo female pt with PMH of DM, HTN, sarcoidosis with numerous pulmonary nodules, history of hypercalcemia on steroids, ? 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 Pt was previously seeing Dr. Cheng; was on prednisone for hypercalcemia which was tapered off two months ago by rheum. She was also started on high dose vitamin D at that time. Pt was scheduled to see me on June 9th in nephrology clinic and had labs done prior which revealed SCa++ of 14.9 and acute kidney injury with creatinine 3.3. Pt was then directed to go to ED. Pt seen and examined in ED. Daughter at bedside. Pt c/o feeling tired, also reports decreased appetite and constipation. Also denies changes in urination, leg swelling. Denies NSAID use, OTC meds or herbal supplements use. Takes Metformin, Enalapril and Lopressor at home.    PAST HISTORY  --------------------------------------------------------------------------------  PAST MEDICAL & SURGICAL HISTORY:  Sarcoidosis  DM (diabetes mellitus)  HTN (hypertension)  Psoriasis  No significant past surgical history    FAMILY HISTORY: non contributory    PAST SOCIAL HISTORY: , lives at home  ALLERGIES & MEDICATIONS  --------------------------------------------------------------------------------  Allergies    Aspirin Adult Low Strength (Swelling)  penicillins (Swelling)    Intolerances      Standing Inpatient Medications  calcitonin Injectable 340 International Unit(s) IntraMuscular every 12 hours  sodium chloride 0.9%. 1000 milliLiter(s) IV Continuous <Continuous>    PRN Inpatient Medications      REVIEW OF SYSTEMS  --------------------------------------------------------------------------------  Gen: No weight changes, fatigue+, no fevers/chills  Skin: No rashes  Head/Eyes/Ears/Mouth: No headache; Normal hearing; Normal vision w/o blurriness; No sinus pain/discomfort, sore throat  Respiratory: No dyspnea, cough, wheezing, hemoptysis  CV: No chest pain, PND, orthopnea  GI: No abdominal pain, diarrhea, constipation+ ,no nausea, vomiting, melena, hematochezia  : No increased frequency, dysuria, hematuria, nocturia  MSK: No joint pain/swelling; no back pain; no edema  Neuro: No dizziness/lightheadedness, weakness  Heme: No easy bruising or bleeding  Endo: No heat/cold intolerance  Psych: No significant nervousness, anxiety, stress, depression    All other systems were reviewed and are negative, except as noted.    VITALS/PHYSICAL EXAM  --------------------------------------------------------------------------------  T(C): 36.8 (05-18-20 @ 18:59), Max: 36.8 (05-18-20 @ 14:08)  HR: 75 (05-18-20 @ 18:59) (75 - 92)  BP: 149/79 (05-18-20 @ 18:59) (135/87 - 149/79)  RR: 18 (05-18-20 @ 18:59) (18 - 18)  SpO2: 95% (05-18-20 @ 18:59) (95% - 98%)  Wt(kg): --  Height (cm): 157.48 (05-18-20 @ 14:08)  Weight (kg): 86.2 (05-18-20 @ 14:08)  BMI (kg/m2): 34.8 (05-18-20 @ 14:08)  BSA (m2): 1.87 (05-18-20 @ 14:08)      Physical Exam:  	Gen: NAD  	HEENT: PERRL, supple neck  	Pulm: CTA B/L  	CV: RRR, S1S2; no rub  	Back: No spinal or CVA tenderness; no sacral edema  	Abd: +BS, soft, nontender/nondistended  	: No suprapubic tenderness  	UE: Warm, no edema; no asterixis  	LE: Warm,  no edema  	Neuro: No focal deficits  	Psych: Normal affect and mood  	Skin: Warm, pink rash on legs  	Vascular access:    LABS/STUDIES  --------------------------------------------------------------------------------              9.6    6.70  >-----------<  336      [05-18-20 @ 16:42]              30.5     140  |  101  |  50.0  ----------------------------<  92      [05-18-20 @ 16:41]  4.3   |  23.0  |  3.68        Ca     15.0     [05-18-20 @ 16:41]      Phos  4.0     [05-18-20 @ 16:41]    TPro  8.6  /  Alb  3.8  /  TBili  0.2  /  DBili  x   /  AST  17  /  ALT  9   /  AlkPhos  48  [05-18-20 @ 16:41]        Serum Osmolality 332      [05-18-20 @ 16:39]    Creatinine Trend:  SCr 3.68 [05-18 @ 16:41]    Urinalysis - [05-18-20 @ 16:47]      Color Yellow / Appearance Clear / SG 1.015 / pH 6.0      Gluc Negative / Ketone Negative  / Bili Negative / Urobili Negative       Blood Trace / Protein 30 / Leuk Est Trace / Nitrite Negative      RBC 0-2 / WBC 3-5 / Hyaline  / Gran  / Sq Epi  / Non Sq Epi Occasional / Bacteria Few    Urine Creatinine 90      [05-18-20 @ 16:46]  Urine Sodium 51      [05-18-20 @ 16:46]  Urine Potassium 39      [05-18-20 @ 16:46]  Urine Chloride 45      [05-18-20 @ 16:46]  Urine Osmolality 376      [05-18-20 @ 16:45]

## 2020-05-18 NOTE — H&P ADULT - ASSESSMENT
60 yr female with psoriasis and sarcoid, presenting with hypercalcemia, dehydration and acute renal failure  from likely sarcoid flare up

## 2020-05-18 NOTE — ED PROVIDER NOTE - CLINICAL SUMMARY MEDICAL DECISION MAKING FREE TEXT BOX
61yo female with PMH sarcoidosis, found to have hypercalcemia and Acute Kidney Injury on outpatient labs. Seen by Dr. Maria (nephro) in ED- plan for IV hydration, repeat labs, and admission. Luzma Vidal DO

## 2020-05-18 NOTE — H&P ADULT - PROBLEM SELECTOR PLAN 1
Receiving saline infusion  Calcitonin 2 doses per nephrology  Nephrology is following.  Order labs - ACE, LDH , iPTH, PTHrP, Chest CT  CMP in am

## 2020-05-18 NOTE — H&P ADULT - NSHPPHYSICALEXAM_GEN_ALL_CORE
Normocephalic   EOMI PERRL  Neck supple no JVD  S1 and S2 regular   Chest CTA B   Abd soft + BS not tender  No pedal edema , No calf tenderness  AAO X3 no focal neurologic deficit.   Psoriatic skin rash scattered   Depressed mood. Affect is appropriate

## 2020-05-19 DIAGNOSIS — E11.9 TYPE 2 DIABETES MELLITUS WITHOUT COMPLICATIONS: ICD-10-CM

## 2020-05-19 DIAGNOSIS — L40.9 PSORIASIS, UNSPECIFIED: ICD-10-CM

## 2020-05-19 DIAGNOSIS — D86.9 SARCOIDOSIS, UNSPECIFIED: ICD-10-CM

## 2020-05-19 DIAGNOSIS — I10 ESSENTIAL (PRIMARY) HYPERTENSION: ICD-10-CM

## 2020-05-19 DIAGNOSIS — N17.9 ACUTE KIDNEY FAILURE, UNSPECIFIED: ICD-10-CM

## 2020-05-19 DIAGNOSIS — E83.52 HYPERCALCEMIA: ICD-10-CM

## 2020-05-19 LAB
ALBUMIN SERPL ELPH-MCNC: 3.5 G/DL — SIGNIFICANT CHANGE UP (ref 3.3–5.2)
ALP SERPL-CCNC: 46 U/L — SIGNIFICANT CHANGE UP (ref 40–120)
ALT FLD-CCNC: 7 U/L — SIGNIFICANT CHANGE UP
ANION GAP SERPL CALC-SCNC: 14 MMOL/L — SIGNIFICANT CHANGE UP (ref 5–17)
AST SERPL-CCNC: 12 U/L — SIGNIFICANT CHANGE UP
BILIRUB SERPL-MCNC: 0.2 MG/DL — LOW (ref 0.4–2)
BUN SERPL-MCNC: 43 MG/DL — HIGH (ref 8–20)
CA-I BLD-SCNC: 1.75 MMOL/L — CRITICAL HIGH (ref 1.12–1.3)
CA-I BLD-SCNC: 1.92 MMOL/L — CRITICAL HIGH (ref 1.12–1.3)
CALCIUM SERPL-MCNC: 12.4 MG/DL — HIGH (ref 8.6–10.2)
CALCIUM UR-MCNC: 18 MG/DL — SIGNIFICANT CHANGE UP
CHLORIDE SERPL-SCNC: 108 MMOL/L — HIGH (ref 98–107)
CO2 SERPL-SCNC: 21 MMOL/L — LOW (ref 22–29)
CREAT SERPL-MCNC: 2.93 MG/DL — HIGH (ref 0.5–1.3)
GLUCOSE BLDC GLUCOMTR-MCNC: 117 MG/DL — HIGH (ref 70–99)
GLUCOSE BLDC GLUCOMTR-MCNC: 129 MG/DL — HIGH (ref 70–99)
GLUCOSE BLDC GLUCOMTR-MCNC: 129 MG/DL — HIGH (ref 70–99)
GLUCOSE BLDC GLUCOMTR-MCNC: 140 MG/DL — HIGH (ref 70–99)
GLUCOSE SERPL-MCNC: 136 MG/DL — HIGH (ref 70–99)
HAV IGM SER-ACNC: SIGNIFICANT CHANGE UP
HBV CORE IGM SER-ACNC: SIGNIFICANT CHANGE UP
HBV SURFACE AG SER-ACNC: SIGNIFICANT CHANGE UP
HCT VFR BLD CALC: 27.5 % — LOW (ref 34.5–45)
HCV AB S/CO SERPL IA: 0.17 S/CO — SIGNIFICANT CHANGE UP (ref 0–0.99)
HCV AB SERPL-IMP: SIGNIFICANT CHANGE UP
HGB BLD-MCNC: 8.7 G/DL — LOW (ref 11.5–15.5)
HIV 1 & 2 AB SERPL IA.RAPID: SIGNIFICANT CHANGE UP
KAPPA LC SER QL IFE: 9.63 MG/DL — HIGH (ref 0.33–1.94)
KAPPA/LAMBDA FREE LIGHT CHAIN RATIO, SERUM: 2 RATIO — HIGH (ref 0.26–1.65)
LAMBDA LC SER QL IFE: 4.81 MG/DL — HIGH (ref 0.57–2.63)
LDH SERPL L TO P-CCNC: 197 U/L — HIGH (ref 98–192)
MCHC RBC-ENTMCNC: 26.2 PG — LOW (ref 27–34)
MCHC RBC-ENTMCNC: 31.6 GM/DL — LOW (ref 32–36)
MCV RBC AUTO: 82.8 FL — SIGNIFICANT CHANGE UP (ref 80–100)
PLATELET # BLD AUTO: 307 K/UL — SIGNIFICANT CHANGE UP (ref 150–400)
POTASSIUM SERPL-MCNC: 4.3 MMOL/L — SIGNIFICANT CHANGE UP (ref 3.5–5.3)
POTASSIUM SERPL-SCNC: 4.3 MMOL/L — SIGNIFICANT CHANGE UP (ref 3.5–5.3)
PROT SERPL-MCNC: 7.1 G/DL — SIGNIFICANT CHANGE UP (ref 6–8.3)
PROT SERPL-MCNC: 7.1 G/DL — SIGNIFICANT CHANGE UP (ref 6–8.3)
PROT SERPL-MCNC: 7.7 G/DL — SIGNIFICANT CHANGE UP (ref 6.6–8.7)
RBC # BLD: 3.32 M/UL — LOW (ref 3.8–5.2)
RBC # FLD: 14.2 % — SIGNIFICANT CHANGE UP (ref 10.3–14.5)
SARS-COV-2 RNA SPEC QL NAA+PROBE: SIGNIFICANT CHANGE UP
SODIUM SERPL-SCNC: 143 MMOL/L — SIGNIFICANT CHANGE UP (ref 135–145)
VIT D25+D1,25 OH+D1,25 PNL SERPL-MCNC: 131 PG/ML — HIGH (ref 19.9–79.3)
WBC # BLD: 5.3 K/UL — SIGNIFICANT CHANGE UP (ref 3.8–10.5)
WBC # FLD AUTO: 5.3 K/UL — SIGNIFICANT CHANGE UP (ref 3.8–10.5)

## 2020-05-19 PROCEDURE — 76775 US EXAM ABDO BACK WALL LIM: CPT | Mod: 26

## 2020-05-19 PROCEDURE — 99232 SBSQ HOSP IP/OBS MODERATE 35: CPT

## 2020-05-19 PROCEDURE — 99233 SBSQ HOSP IP/OBS HIGH 50: CPT

## 2020-05-19 RX ORDER — INSULIN LISPRO 100/ML
VIAL (ML) SUBCUTANEOUS
Refills: 0 | Status: DISCONTINUED | OUTPATIENT
Start: 2020-05-19 | End: 2020-05-20

## 2020-05-19 RX ORDER — DEXTROSE 50 % IN WATER 50 %
15 SYRINGE (ML) INTRAVENOUS ONCE
Refills: 0 | Status: DISCONTINUED | OUTPATIENT
Start: 2020-05-19 | End: 2020-05-20

## 2020-05-19 RX ORDER — GLUCAGON INJECTION, SOLUTION 0.5 MG/.1ML
1 INJECTION, SOLUTION SUBCUTANEOUS ONCE
Refills: 0 | Status: DISCONTINUED | OUTPATIENT
Start: 2020-05-19 | End: 2020-05-20

## 2020-05-19 RX ORDER — DEXTROSE 50 % IN WATER 50 %
25 SYRINGE (ML) INTRAVENOUS ONCE
Refills: 0 | Status: DISCONTINUED | OUTPATIENT
Start: 2020-05-19 | End: 2020-05-20

## 2020-05-19 RX ORDER — METOPROLOL TARTRATE 50 MG
1 TABLET ORAL
Qty: 0 | Refills: 0 | DISCHARGE

## 2020-05-19 RX ORDER — ACETAMINOPHEN 500 MG
650 TABLET ORAL EVERY 6 HOURS
Refills: 0 | Status: DISCONTINUED | OUTPATIENT
Start: 2020-05-19 | End: 2020-05-20

## 2020-05-19 RX ORDER — SODIUM CHLORIDE 9 MG/ML
1000 INJECTION, SOLUTION INTRAVENOUS
Refills: 0 | Status: DISCONTINUED | OUTPATIENT
Start: 2020-05-19 | End: 2020-05-20

## 2020-05-19 RX ORDER — SODIUM CHLORIDE 9 MG/ML
1000 INJECTION INTRAMUSCULAR; INTRAVENOUS; SUBCUTANEOUS
Refills: 0 | Status: DISCONTINUED | OUTPATIENT
Start: 2020-05-19 | End: 2020-05-20

## 2020-05-19 RX ORDER — DEXTROSE 50 % IN WATER 50 %
12.5 SYRINGE (ML) INTRAVENOUS ONCE
Refills: 0 | Status: DISCONTINUED | OUTPATIENT
Start: 2020-05-19 | End: 2020-05-20

## 2020-05-19 RX ORDER — ERGOCALCIFEROL 1.25 MG/1
1 CAPSULE ORAL
Qty: 0 | Refills: 0 | DISCHARGE

## 2020-05-19 RX ADMIN — Medication 40 MILLIGRAM(S): at 09:33

## 2020-05-19 RX ADMIN — SODIUM CHLORIDE 150 MILLILITER(S): 9 INJECTION INTRAMUSCULAR; INTRAVENOUS; SUBCUTANEOUS at 09:33

## 2020-05-19 RX ADMIN — HEPARIN SODIUM 5000 UNIT(S): 5000 INJECTION INTRAVENOUS; SUBCUTANEOUS at 05:41

## 2020-05-19 RX ADMIN — SODIUM CHLORIDE 100 MILLILITER(S): 9 INJECTION INTRAMUSCULAR; INTRAVENOUS; SUBCUTANEOUS at 11:38

## 2020-05-19 RX ADMIN — HEPARIN SODIUM 5000 UNIT(S): 5000 INJECTION INTRAVENOUS; SUBCUTANEOUS at 16:01

## 2020-05-19 NOTE — PROGRESS NOTE ADULT - SUBJECTIVE AND OBJECTIVE BOX
[facility] DIVISION OF KIDNEY DISEASES AND HYPERTENSION -- FOLLOW UP NOTE  --------------------------------------------------------------------------------  Chief Complaint: hypercalcemia, moisés    24 hour events/subjective:  s/p IV hydration, calcitonin 2 doses and prednisone 40 mg   Pt seen and examined; feels well         PAST HISTORY  --------------------------------------------------------------------------------  No significant changes to PMH, PSH, FHx, SHx, unless otherwise noted    ALLERGIES & MEDICATIONS  --------------------------------------------------------------------------------  Allergies    Aspirin Adult Low Strength (Swelling)  penicillins (Swelling)    Intolerances      Standing Inpatient Medications  dextrose 5%. 1000 milliLiter(s) IV Continuous <Continuous>  dextrose 50% Injectable 12.5 Gram(s) IV Push once  dextrose 50% Injectable 25 Gram(s) IV Push once  dextrose 50% Injectable 25 Gram(s) IV Push once  heparin   Injectable 5000 Unit(s) SubCutaneous every 12 hours  insulin lispro (HumaLOG) corrective regimen sliding scale   SubCutaneous three times a day before meals  predniSONE   Tablet 40 milliGRAM(s) Oral daily  sodium chloride 0.9%. 1000 milliLiter(s) IV Continuous <Continuous>    PRN Inpatient Medications  acetaminophen   Tablet .. 650 milliGRAM(s) Oral every 6 hours PRN  cyclobenzaprine 10 milliGRAM(s) Oral three times a day PRN  dextrose 40% Gel 15 Gram(s) Oral once PRN  glucagon  Injectable 1 milliGRAM(s) IntraMuscular once PRN      REVIEW OF SYSTEMS  --------------------------------------------------------------------------------  Gen: No weight changes,  fevers/chill  Skin: No rashes  Head/Eyes/Ears/Mouth: No headache; Normal hearing; Normal vision w/o blurriness  Respiratory: No dyspnea, cough, wheezing, hemoptysis  CV: No chest pain, PND, orthopnea  GI: No abdominal pain, diarrhea, constipation+  : No increased frequency, dysuria, hematuria, nocturia  MSK: No joint pain/swelling; no back pain; no edema  Neuro: No dizziness/lightheadedness, weakness, seizures, numbness, tingling  Heme: No easy bruising or bleeding  Endo: No heat/cold intolerance  Psych: No significant nervousness, anxiety, stress, depression    All other systems were reviewed and are negative, except as noted.    VITALS/PHYSICAL EXAM  --------------------------------------------------------------------------------  T(C): 36.7 (05-19-20 @ 04:17), Max: 36.8 (05-18-20 @ 14:08)  HR: 88 (05-19-20 @ 04:17) (75 - 92)  BP: 153/82 (05-19-20 @ 04:17) (127/69 - 153/82)  RR: 18 (05-19-20 @ 04:17) (18 - 18)  SpO2: 97% (05-19-20 @ 04:17) (95% - 98%)  Wt(kg): --  Height (cm): 157.48 (05-18-20 @ 14:08)  Weight (kg): 86.2 (05-18-20 @ 14:08)  BMI (kg/m2): 34.8 (05-18-20 @ 14:08)  BSA (m2): 1.87 (05-18-20 @ 14:08)      Physical Exam:  	Gen: NAD,   	HEENT: supple neck, clear oropharynx  	Pulm: CTA B/L  	CV: RRR, S1S2; no rub  	Back: No spinal or CVA tenderness; no sacral edema  	Abd: +BS, soft, nontender/nondistended  	: No suprapubic tenderness  	UE: Warm,  no edema; no asterixis  	LE: Warm,  no edema  	Neuro: No focal deficits  	Psych: Normal affect and mood  	Skin: Warm,      LABS/STUDIES  --------------------------------------------------------------------------------              8.7    5.30  >-----------<  307      [05-19-20 @ 08:16]              27.5     143  |  108  |  43.0  ----------------------------<  136      [05-19-20 @ 08:16]  4.3   |  21.0  |  2.93        Ca     12.4     [05-19-20 @ 08:16]      iCa    1.92     [05-19 @ 03:45]      Phos  4.0     [05-18-20 @ 16:41]    TPro  7.7  /  Alb  3.5  /  TBili  0.2  /  DBili  x   /  AST  12  /  ALT  7   /  AlkPhos  46  [05-19-20 @ 08:16]              [05-19-20 @ 08:16]  Serum Osmolality 332      [05-18-20 @ 16:39]    Creatinine Trend:  SCr 2.93 [05-19 @ 08:16]  SCr 3.68 [05-18 @ 16:41]    Urinalysis - [05-18-20 @ 16:47]      Color Yellow / Appearance Clear / SG 1.015 / pH 6.0      Gluc Negative / Ketone Negative  / Bili Negative / Urobili Negative       Blood Trace / Protein 30 / Leuk Est Trace / Nitrite Negative      RBC 0-2 / WBC 3-5 / Hyaline  / Gran  / Sq Epi  / Non Sq Epi Occasional / Bacteria Few    Urine Creatinine 90      [05-18-20 @ 16:46]  Urine Sodium 51      [05-18-20 @ 16:46]  Urine Potassium 39      [05-18-20 @ 16:46]  Urine Chloride 45      [05-18-20 @ 16:46]  Urine Osmolality 376      [05-18-20 @ 16:45]    PTH -- (Ca 14.9)      [05-18-20 @ 22:10]   17  Vitamin D (25OH) 63.2      [05-18-20 @ 22:10]    HIV Nonreact      [05-19-20 @ 08:16]     EXAM:  US KIDNEY(S)                          PROCEDURE DATE:  05/19/2020      INTERPRETATION:  CLINICAL INFORMATION: Renal insufficiency.    COMPARISON: CT chest 5/18/2020    TECHNIQUE: Sonography of the kidneys.     FINDINGS:    Right kidney: 10.3 cm. No hydronephrosis. 9 mm cyst in the mid kidney. Echogenic focus in the lower pole measuring 4 mm, possibly a nonobstructing calculus.    Left kidney:  10.2 cm. No renal mass, hydronephrosis or calculi.      IMPRESSION:     No hydronephrosis.      RENEE AMAYA M.D., ATTENDING RADIOLOGIST  This document has been electronically signed. May 19 2020  9:54AM               EXAM:  CT CHEST                          PROCEDURE DATE:  05/18/2020        INTERPRETATION:  CLINICAL INFORMATION: Sarcoidosis    COMPARISON: None.    PROCEDURE:   CT of the Chest was performed without intravenous contrast.  Sagittal and coronalreformats were performed.      FINDINGS:    LUNGS AND AIRWAYS: Patent central airways.  Scattered bilateral pulmonary nodules measuring under 5 mm in size, likely in keeping with patient's history of sarcoidosis.    PLEURA: No pleural effusion.    MEDIASTINUM AND ALEXIA: Calcified mediastinal and hilar lymphadenopathy in keeping with patient's history of sarcoidosis.    VESSELS: Within normal limits.    HEART: Heart size is normal. No pericardial effusion.    CHEST WALL AND LOWER NECK: Prominent bilateral axillary lymph nodes have a benign reniform shape.    VISUALIZED UPPER ABDOMEN: Surgical clips in the left suprarenal fossa. Right adrenal myelolipoma measures 2.5 cm.    BONES: Degenerative changes of the spine    IMPRESSION:     Scattered bilateral pulmonary nodules and thoracic lymphadenopathy keeping with patient's history of sarcoidosis. Continued surveillance recommended.

## 2020-05-19 NOTE — PROGRESS NOTE ADULT - ASSESSMENT
1) Hypercalcemia  2) Acute kidney injury  3) Sarcoidosis with pulmonary nodules  4) HTN    H/o hypercalcemia previously attributed to Sarcoidosis; tapered off steroids  Hypercalcemia likely from flare up of sarcoidosis; pt was also on high dose Vitamin D - 1-25(OH)2 vitamin D high at 131  Also with moisés in setting of dehydration from hypercalcemia  s/p IV hydration and 2 doses of calcitonin  SCa++ improving; continue Iv hydration- decrease rate to 100 cc/hr  Monitor for volume overload; may give Lasix 40 mg IV prn  Continue Prednisone 40 mg daily- will taper off as outpt  Continue to hold Metformin, ACE-I and vitamin D   CT chest noted- pulm follow up outpt  Renal US with no hydronephrosis  Monitor Scr, lytes, UOP

## 2020-05-19 NOTE — PROGRESS NOTE ADULT - SUBJECTIVE AND OBJECTIVE BOX
RADHA BRAND    [id number]    60y      Female    INTERVAL HPI/OVERNIGHT EVENTS: patient beign seen for moisés and hypercalcemia. patient seen at bedside and states feeling well.     REVIEW OF SYSTEMS:    CONSTITUTIONAL: No fever, weight loss, or fatigue  RESPIRATORY: No cough, wheezing, hemoptysis; No shortness of breath  CARDIOVASCULAR: No chest pain, palpitations  GASTROINTESTINAL: No abdominal or epigastric pain. No nausea, vomiting  NEUROLOGICAL: No headaches, memory loss, loss of strength.  MISCELLANEOUS:      Vital Signs Last 24 Hrs  T(C): 36.5 (19 May 2020 11:38), Max: 36.8 (18 May 2020 14:08)  T(F): 97.7 (19 May 2020 11:38), Max: 98.3 (18 May 2020 14:08)  HR: 69 (19 May 2020 11:47) (69 - 92)  BP: 134/76 (19 May 2020 11:47) (127/69 - 157/84)  BP(mean): --  RR: 18 (19 May 2020 11:38) (18 - 18)  SpO2: 96% (19 May 2020 11:38) (95% - 98%)    PHYSICAL EXAM:    GENERAL: NAD, obese  HEENT: PERRL, +EOMI  NECK: soft, Supple, No JVD,   CHEST/LUNG: Clear to auscultation bilaterally; No wheezing  HEART: S1S2+, Regular rate and rhythm; No murmurs, rubs, or gallops  ABDOMEN: Soft, Nontender,   EXTREMITIES:  chronic skin changes  SKIN: No rashes or lesions  NEURO: AAOX3,   PSYCH: normal mood      LABS:                        8.7    5.30  )-----------( 307      ( 19 May 2020 08:16 )             27.5     05-19    143  |  108<H>  |  43.0<H>  ----------------------------<  136<H>  4.3   |  21.0<L>  |  2.93<H>    Ca    12.4<H>      19 May 2020 08:16  Phos  4.0     05-18    TPro  7.7  /  Alb  3.5  /  TBili  0.2<L>  /  DBili  x   /  AST  12  /  ALT  7   /  AlkPhos  46  05-19      Urinalysis Basic - ( 18 May 2020 16:47 )    Color: Yellow / Appearance: Clear / S.015 / pH: x  Gluc: x / Ketone: Negative  / Bili: Negative / Urobili: Negative mg/dL   Blood: x / Protein: 30 mg/dL / Nitrite: Negative   Leuk Esterase: Trace / RBC: 0-2 /HPF / WBC 3-5   Sq Epi: x / Non Sq Epi: Occasional / Bacteria: Few          MEDICATIONS  (STANDING):  dextrose 5%. 1000 milliLiter(s) (50 mL/Hr) IV Continuous <Continuous>  dextrose 50% Injectable 12.5 Gram(s) IV Push once  dextrose 50% Injectable 25 Gram(s) IV Push once  dextrose 50% Injectable 25 Gram(s) IV Push once  heparin   Injectable 5000 Unit(s) SubCutaneous every 12 hours  insulin lispro (HumaLOG) corrective regimen sliding scale   SubCutaneous three times a day before meals  predniSONE   Tablet 40 milliGRAM(s) Oral daily  sodium chloride 0.9%. 1000 milliLiter(s) (100 mL/Hr) IV Continuous <Continuous>    MEDICATIONS  (PRN):  acetaminophen   Tablet .. 650 milliGRAM(s) Oral every 6 hours PRN Temp greater or equal to 38C (100.4F), Mild Pain (1 - 3)  cyclobenzaprine 10 milliGRAM(s) Oral three times a day PRN Muscle Spasm  dextrose 40% Gel 15 Gram(s) Oral once PRN Blood Glucose LESS THAN 70 milliGRAM(s)/deciliter  glucagon  Injectable 1 milliGRAM(s) IntraMuscular once PRN Glucose LESS THAN 70 milligrams/deciliter      RADIOLOGY & ADDITIONAL TESTS:

## 2020-05-19 NOTE — PROGRESS NOTE ADULT - ASSESSMENT
1) MALIKA - renal following  --> improved  -> hold ace-I    2) hypercalemia - imrpvoed with calcitonin     3) dm2 - ssi  --> check a1c    4) sarcoidosis - will call pulm for am     dispo - hopeful dc next 24-48 hours

## 2020-05-20 ENCOUNTER — TRANSCRIPTION ENCOUNTER (OUTPATIENT)
Age: 60
End: 2020-05-20

## 2020-05-20 VITALS
HEART RATE: 62 BPM | DIASTOLIC BLOOD PRESSURE: 62 MMHG | RESPIRATION RATE: 18 BRPM | OXYGEN SATURATION: 96 % | SYSTOLIC BLOOD PRESSURE: 161 MMHG | TEMPERATURE: 98 F

## 2020-05-20 DIAGNOSIS — Z71.89 OTHER SPECIFIED COUNSELING: ICD-10-CM

## 2020-05-20 LAB
% ALBUMIN: 46.4 % — SIGNIFICANT CHANGE UP
% ALPHA 1: 5.2 % — SIGNIFICANT CHANGE UP
% ALPHA 2: 11.4 % — SIGNIFICANT CHANGE UP
% BETA: 15 % — SIGNIFICANT CHANGE UP
% GAMMA: 22 % — SIGNIFICANT CHANGE UP
A1C WITH ESTIMATED AVERAGE GLUCOSE RESULT: 5.8 % — HIGH (ref 4–5.6)
ACE SERPL-CCNC: 7 U/L — LOW (ref 14–82)
ALBUMIN SERPL ELPH-MCNC: 3.3 G/DL — LOW (ref 3.6–5.5)
ALBUMIN SERPL ELPH-MCNC: 3.4 G/DL — SIGNIFICANT CHANGE UP (ref 3.3–5.2)
ALBUMIN/GLOB SERPL ELPH: 0.9 RATIO — SIGNIFICANT CHANGE UP
ALP SERPL-CCNC: 41 U/L — SIGNIFICANT CHANGE UP (ref 40–120)
ALPHA1 GLOB SERPL ELPH-MCNC: 0.4 G/DL — SIGNIFICANT CHANGE UP (ref 0.1–0.4)
ALPHA2 GLOB SERPL ELPH-MCNC: 0.8 G/DL — SIGNIFICANT CHANGE UP (ref 0.5–1)
ALT FLD-CCNC: 7 U/L — SIGNIFICANT CHANGE UP
ANION GAP SERPL CALC-SCNC: 12 MMOL/L — SIGNIFICANT CHANGE UP (ref 5–17)
AST SERPL-CCNC: 13 U/L — SIGNIFICANT CHANGE UP
B-GLOBULIN SERPL ELPH-MCNC: 1.1 G/DL — HIGH (ref 0.5–1)
BASOPHILS # BLD AUTO: 0.01 K/UL — SIGNIFICANT CHANGE UP (ref 0–0.2)
BASOPHILS NFR BLD AUTO: 0.1 % — SIGNIFICANT CHANGE UP (ref 0–2)
BILIRUB SERPL-MCNC: <0.2 MG/DL — LOW (ref 0.4–2)
BUN SERPL-MCNC: 49 MG/DL — HIGH (ref 8–20)
CALCIUM SERPL-MCNC: 11.6 MG/DL — HIGH (ref 8.6–10.2)
CHLORIDE SERPL-SCNC: 109 MMOL/L — HIGH (ref 98–107)
CO2 SERPL-SCNC: 22 MMOL/L — SIGNIFICANT CHANGE UP (ref 22–29)
CREAT SERPL-MCNC: 2.55 MG/DL — HIGH (ref 0.5–1.3)
EOSINOPHIL # BLD AUTO: 0.16 K/UL — SIGNIFICANT CHANGE UP (ref 0–0.5)
EOSINOPHIL NFR BLD AUTO: 2.3 % — SIGNIFICANT CHANGE UP (ref 0–6)
ESTIMATED AVERAGE GLUCOSE: 120 MG/DL — HIGH (ref 68–114)
GAMMA GLOBULIN: 1.6 G/DL — SIGNIFICANT CHANGE UP (ref 0.6–1.6)
GLUCOSE BLDC GLUCOMTR-MCNC: 105 MG/DL — HIGH (ref 70–99)
GLUCOSE BLDC GLUCOMTR-MCNC: 121 MG/DL — HIGH (ref 70–99)
GLUCOSE BLDC GLUCOMTR-MCNC: 170 MG/DL — HIGH (ref 70–99)
GLUCOSE SERPL-MCNC: 88 MG/DL — SIGNIFICANT CHANGE UP (ref 70–99)
HCT VFR BLD CALC: 26.5 % — LOW (ref 34.5–45)
HCV AB S/CO SERPL IA: 0.13 S/CO — SIGNIFICANT CHANGE UP (ref 0–0.99)
HCV AB SERPL-IMP: SIGNIFICANT CHANGE UP
HGB BLD-MCNC: 8.3 G/DL — LOW (ref 11.5–15.5)
IMM GRANULOCYTES NFR BLD AUTO: 0.3 % — SIGNIFICANT CHANGE UP (ref 0–1.5)
INTERPRETATION SERPL IFE-IMP: SIGNIFICANT CHANGE UP
KAPPA LC SER QL IFE: 6.89 MG/DL — HIGH (ref 0.33–1.94)
KAPPA/LAMBDA FREE LIGHT CHAIN RATIO, SERUM: 1.88 RATIO — HIGH (ref 0.26–1.65)
LAMBDA LC SER QL IFE: 3.66 MG/DL — HIGH (ref 0.57–2.63)
LYMPHOCYTES # BLD AUTO: 0.78 K/UL — LOW (ref 1–3.3)
LYMPHOCYTES # BLD AUTO: 11 % — LOW (ref 13–44)
MAGNESIUM SERPL-MCNC: 1.5 MG/DL — LOW (ref 1.6–2.6)
MCHC RBC-ENTMCNC: 26.3 PG — LOW (ref 27–34)
MCHC RBC-ENTMCNC: 31.3 GM/DL — LOW (ref 32–36)
MCV RBC AUTO: 83.9 FL — SIGNIFICANT CHANGE UP (ref 80–100)
MONOCYTES # BLD AUTO: 0.62 K/UL — SIGNIFICANT CHANGE UP (ref 0–0.9)
MONOCYTES NFR BLD AUTO: 8.8 % — SIGNIFICANT CHANGE UP (ref 2–14)
NEUTROPHILS # BLD AUTO: 5.49 K/UL — SIGNIFICANT CHANGE UP (ref 1.8–7.4)
NEUTROPHILS NFR BLD AUTO: 77.5 % — HIGH (ref 43–77)
PLATELET # BLD AUTO: 310 K/UL — SIGNIFICANT CHANGE UP (ref 150–400)
POTASSIUM SERPL-MCNC: 4 MMOL/L — SIGNIFICANT CHANGE UP (ref 3.5–5.3)
POTASSIUM SERPL-SCNC: 4 MMOL/L — SIGNIFICANT CHANGE UP (ref 3.5–5.3)
PROT PATTERN SERPL ELPH-IMP: SIGNIFICANT CHANGE UP
PROT SERPL-MCNC: 6.9 G/DL — SIGNIFICANT CHANGE UP (ref 6.6–8.7)
RBC # BLD: 3.16 M/UL — LOW (ref 3.8–5.2)
RBC # FLD: 14.1 % — SIGNIFICANT CHANGE UP (ref 10.3–14.5)
SODIUM SERPL-SCNC: 143 MMOL/L — SIGNIFICANT CHANGE UP (ref 135–145)
WBC # BLD: 7.08 K/UL — SIGNIFICANT CHANGE UP (ref 3.8–10.5)
WBC # FLD AUTO: 7.08 K/UL — SIGNIFICANT CHANGE UP (ref 3.8–10.5)

## 2020-05-20 PROCEDURE — 99239 HOSP IP/OBS DSCHRG MGMT >30: CPT

## 2020-05-20 PROCEDURE — 99233 SBSQ HOSP IP/OBS HIGH 50: CPT

## 2020-05-20 PROCEDURE — 99222 1ST HOSP IP/OBS MODERATE 55: CPT

## 2020-05-20 RX ORDER — SITAGLIPTIN 50 MG/1
1 TABLET, FILM COATED ORAL
Qty: 30 | Refills: 0
Start: 2020-05-20 | End: 2020-06-18

## 2020-05-20 RX ORDER — METFORMIN HYDROCHLORIDE 850 MG/1
1 TABLET ORAL
Qty: 0 | Refills: 0 | DISCHARGE

## 2020-05-20 RX ORDER — MAGNESIUM SULFATE 500 MG/ML
2 VIAL (ML) INJECTION ONCE
Refills: 0 | Status: COMPLETED | OUTPATIENT
Start: 2020-05-20 | End: 2020-05-20

## 2020-05-20 RX ORDER — MAGNESIUM SULFATE 500 MG/ML
1 VIAL (ML) INJECTION EVERY 6 HOURS
Refills: 0 | Status: DISCONTINUED | OUTPATIENT
Start: 2020-05-20 | End: 2020-05-20

## 2020-05-20 RX ORDER — AMLODIPINE BESYLATE 2.5 MG/1
5 TABLET ORAL DAILY
Refills: 0 | Status: DISCONTINUED | OUTPATIENT
Start: 2020-05-20 | End: 2020-05-20

## 2020-05-20 RX ORDER — AMLODIPINE BESYLATE 2.5 MG/1
1 TABLET ORAL
Qty: 30 | Refills: 0
Start: 2020-05-20 | End: 2020-06-18

## 2020-05-20 RX ADMIN — SODIUM CHLORIDE 100 MILLILITER(S): 9 INJECTION INTRAMUSCULAR; INTRAVENOUS; SUBCUTANEOUS at 12:58

## 2020-05-20 RX ADMIN — HEPARIN SODIUM 5000 UNIT(S): 5000 INJECTION INTRAVENOUS; SUBCUTANEOUS at 05:55

## 2020-05-20 RX ADMIN — Medication 2: at 13:06

## 2020-05-20 RX ADMIN — AMLODIPINE BESYLATE 5 MILLIGRAM(S): 2.5 TABLET ORAL at 09:57

## 2020-05-20 RX ADMIN — Medication 50 GRAM(S): at 10:00

## 2020-05-20 RX ADMIN — HEPARIN SODIUM 5000 UNIT(S): 5000 INJECTION INTRAVENOUS; SUBCUTANEOUS at 17:44

## 2020-05-20 RX ADMIN — Medication 40 MILLIGRAM(S): at 05:55

## 2020-05-20 NOTE — DISCHARGE NOTE PROVIDER - PROVIDER TOKENS
PROVIDER:[TOKEN:[[id number]]],PROVIDER:[TOKEN:[[id number]]],FREE:[LAST:[primary care],PHONE:[(   )    -],FAX:[(   )    -]]

## 2020-05-20 NOTE — DISCHARGE NOTE PROVIDER - NSDCMRMEDTOKEN_GEN_ALL_CORE_FT
amLODIPine 5 mg oral tablet: 1 tab(s) orally once a day  Januvia 50 mg oral tablet: 1 tab(s) orally once a day   metoprolol tartrate 25 mg oral tablet: 1 tab(s) orally 2 times a day  Vitamin D2 50,000 intl units (1.25 mg) oral capsule: 1 cap(s) orally once a week amLODIPine 5 mg oral tablet: 1 tab(s) orally once a day  glipiZIDE 5 mg oral tablet: 1 tab(s) orally once a day   metoprolol tartrate 25 mg oral tablet: 1 tab(s) orally 2 times a day  predniSONE 20 mg oral tablet: 1 tab(s) orally once a day  Vitamin D2 50,000 intl units (1.25 mg) oral capsule: 1 cap(s) orally once a week

## 2020-05-20 NOTE — DISCHARGE NOTE PROVIDER - CARE PROVIDER_API CALL
Leland Sims  CRITICAL CARE MEDICINE  [address] [place] [address]  Phone: [phone]  Fax: [phone]  Follow Up Time:     Edgardo Cheng (DO)  Internal Medicine  [address] [place] [address], [address] [place] [address]  Phone: [phone]  Fax: [phone]  Follow Up Time:     primary care,   Phone: (   )    -  Fax: (   )    -  Follow Up Time:

## 2020-05-20 NOTE — DISCHARGE NOTE PROVIDER - CARE PROVIDERS DIRECT ADDRESSES
[email or web address],DirectAddress_Unknown

## 2020-05-20 NOTE — DISCHARGE NOTE PROVIDER - HOSPITAL COURSE
Patient is a 60 yr female with history of sarcoidosis, psoriasis, DM, Hypertension.  Said she was not feeling well for the past one week notably tired and poor po intake. Had gone to see nephrology who ordered labs. Call to go to ED  for an abnormal lab report Ca 14.9 and Cr 3.3 up from baseline of 1.0  Had complete steroid taper 2 months prior for rheumatologic disease flare up.  In the ED Serum Ca 15.0 , BUN/Cr 50/3.68  CXR scattered jason pulm nodules and thoracic lympadenopathy compartible with pulm sarcoid.          Patient admitted to medicine and seen by renal in consult. Metformin and ace-I held. Patient started on fluids and improved clinically. Patients calcium improved as did her CR. Patient is likley to be discharged to home with close follow up with primary         calcitonin and prednisone started        time spent on dc 42 minutes        PE    GENERAL: NAD, obese    HEENT: PERRL, +EOMI    NECK: soft, Supple, No JVD,     CHEST/LUNG: Clear to auscultation bilaterally; No wheezing    HEART: S1S2+, Regular rate and rhythm; No murmurs, rubs, or gallops    ABDOMEN: Soft, Nontender,     EXTREMITIES:  chronic skin changes    SKIN: No rashes or lesions    NEURO: AAOX3,     PSYCH: normal mood Patient is a 60 yr female with history of sarcoidosis, psoriasis, DM, Hypertension.  Said she was not feeling well for the past one week notably tired and poor po intake. Had gone to see nephrology who ordered labs. Call to go to ED  for an abnormal lab report Ca 14.9 and Cr 3.3 up from baseline of 1.0  Had complete steroid taper 2 months prior for rheumatologic disease flare up.  In the ED Serum Ca 15.0 , BUN/Cr 50/3.68  CXR scattered jason pulm nodules and thoracic lympadenopathy compartible with pulm sarcoid.          Patient admitted to medicine and seen by renal in consult. Metformin and ace-I held. Patient started on fluids and improved clinically. Patients calcium improved as did her CR. Patient is likley to be discharged to home with close follow up with primary         calcitonin and prednisone started. patient advised to avoid high calcium food. patient advised to stop metformin and to check her blood work next week.         time spent on dc 42 minutes        PE    GENERAL: NAD, obese    HEENT: PERRL, +EOMI    NECK: soft, Supple, No JVD,     CHEST/LUNG: Clear to auscultation bilaterally; No wheezing    HEART: S1S2+, Regular rate and rhythm; No murmurs, rubs, or gallops    ABDOMEN: Soft, Nontender,     EXTREMITIES:  chronic skin changes    SKIN: No rashes or lesions    NEURO: AAOX3,     PSYCH: normal mood

## 2020-05-20 NOTE — CHART NOTE - NSCHARTNOTEFT_GEN_A_CORE
Aware MD requesting nutrition education for hypercalcemia. Verbal education not provided due to precautionary measures to limit the spread of infection. Written education (calcium content of foods) provided on lunch tray. Encouraged to reach out to in RD with any questions regarding literature provided prior to d/c. RD to remain available.

## 2020-05-20 NOTE — DISCHARGE NOTE PROVIDER - NSDCCPCAREPLAN_GEN_ALL_CORE_FT
PRINCIPAL DISCHARGE DIAGNOSIS  Diagnosis: Hypercalcemia  Assessment and Plan of Treatment: SARCOIDOSIS      SECONDARY DISCHARGE DIAGNOSES  Diagnosis: MALIKA (acute kidney injury)  Assessment and Plan of Treatment:

## 2020-05-20 NOTE — DISCHARGE NOTE NURSING/CASE MANAGEMENT/SOCIAL WORK - PATIENT PORTAL LINK FT
You can access the FollowMyHealth Patient Portal offered by [facility] by registering at the following website: [email or web address]. By joining [employer]’s FollowMyHealth portal, you will also be able to view your health information using other applications (apps) compatible with our system.

## 2020-05-20 NOTE — DISCHARGE NOTE PROVIDER - NSDCFUSCHEDAPPT_GEN_ALL_CORE_FT
[facility] ; 06/09/2020 ; NPP Nephro [address] [facility] ; 07/23/2020 ; NPP PulmMed [address] [place] [address] [facility] ; 06/09/2020 ; NPP Nephro [address] [facility] ; 07/23/2020 ; NPP PulmMed [address] [place] [address]

## 2020-05-20 NOTE — CONSULT NOTE ADULT - SUBJECTIVE AND OBJECTIVE BOX
PULMONARY CONSULT NOTE      RADHA BRANDN-[id number]    Patient is a 60y old  Female who presents with a chief complaint of Hypercalcemia (20 May 2020 08:30)      INTERVAL HPI/OVERNIGHT EVENTS: Patient follows with Dr. Duff for sarcoidosis. She recently completed a course of steroids. She denies SOB currently and at baseline. She complains of a mild occasional dry cough. She complains of decreased appetite and fatigue.    MEDICATIONS  (STANDING):  amLODIPine   Tablet 5 milliGRAM(s) Oral daily  dextrose 5%. 1000 milliLiter(s) (50 mL/Hr) IV Continuous <Continuous>  dextrose 50% Injectable 12.5 Gram(s) IV Push once  dextrose 50% Injectable 25 Gram(s) IV Push once  dextrose 50% Injectable 25 Gram(s) IV Push once  heparin   Injectable 5000 Unit(s) SubCutaneous every 12 hours  insulin lispro (HumaLOG) corrective regimen sliding scale   SubCutaneous three times a day before meals  predniSONE   Tablet 40 milliGRAM(s) Oral daily  sodium chloride 0.9%. 1000 milliLiter(s) (100 mL/Hr) IV Continuous <Continuous>      MEDICATIONS  (PRN):  acetaminophen   Tablet .. 650 milliGRAM(s) Oral every 6 hours PRN Temp greater or equal to 38C (100.4F), Mild Pain (1 - 3)  cyclobenzaprine 10 milliGRAM(s) Oral three times a day PRN Muscle Spasm  dextrose 40% Gel 15 Gram(s) Oral once PRN Blood Glucose LESS THAN 70 milliGRAM(s)/deciliter  glucagon  Injectable 1 milliGRAM(s) IntraMuscular once PRN Glucose LESS THAN 70 milligrams/deciliter      Allergies    Aspirin Adult Low Strength (Swelling)  penicillins (Swelling)    Intolerances        PAST MEDICAL & SURGICAL HISTORY:  Sarcoidosis  DM (diabetes mellitus)  HTN (hypertension)  Psoriasis  No significant past surgical history      FAMILY HISTORY:      SOCIAL HISTORY  Smoking History: never    REVIEW OF SYSTEMS:    CONSTITUTIONAL:  As per HPI.    HEENT:  Eyes:  No diplopia or blurred vision. ENT:  No earache, sore throat or runny nose.    CARDIOVASCULAR:  No pressure, squeezing, tightness, heaviness or aching about the chest; no palpitations.    RESPIRATORY:  No cough, shortness of breath, PND or orthopnea. Mild SOBOE    GASTROINTESTINAL:  No nausea, vomiting or diarrhea.    GENITOURINARY:  No dysuria, frequency or urgency.    MUSCULOSKELETAL:  No joint pains    SKIN:  No new lesions.    NEUROLOGIC:  No paresthesias, fasciculations, seizures or weakness.    PSYCHIATRIC:  No disorder of thought or mood.    ENDOCRINE:  No heat or cold intolerance, polyuria or polydipsia.    HEMATOLOGICAL:  No easy bruising or bleeding.     Vital Signs Last 24 Hrs  T(C): 36.6 (20 May 2020 07:30), Max: 36.6 (20 May 2020 07:30)  T(F): 97.8 (20 May 2020 07:30), Max: 97.8 (20 May 2020 07:30)  HR: 62 (20 May 2020 07:30) (62 - 75)  BP: 161/62 (20 May 2020 07:30) (117/75 - 161/62)  BP(mean): --  RR: 18 (20 May 2020 07:30) (16 - 18)  SpO2: 96% (20 May 2020 07:30) (96% - 98%)    PHYSICAL EXAMINATION:    GENERAL: The patient is a well-developed, well-nourished. in no apparent distress.     HEENT: Head is normocephalic and atraumatic. Extraocular muscles are intact. Mucous membranes are moist.     NECK: Supple.     LUNGS: Clear to auscultation without wheezing, rales, or rhonchi. Respirations unlabored    HEART: Regular rate and rhythm without murmur.    ABDOMEN: Soft, nontender, and nondistended.  No hepatosplenomegaly is noted.    EXTREMITIES: Without any cyanosis, clubbing, rash, lesions or edema.    NEUROLOGIC: Grossly intact.    SKIN: No ulceration or induration present.      LABS:                        8.3    7.08  )-----------( 310      ( 20 May 2020 07:23 )             26.5     05-20    143  |  109<H>  |  49.0<H>  ----------------------------<  88  4.0   |  22.0  |  2.55<H>    Ca    11.6<H>      20 May 2020 07:23  Phos  4.0     05-18  Mg     1.5     05-20    TPro  6.9  /  Alb  3.4  /  TBili  <0.2<L>  /  DBili  x   /  AST  13  /  ALT  7   /  AlkPhos  41  05-20      Urinalysis Basic - ( 18 May 2020 16:47 )    Color: Yellow / Appearance: Clear / S.015 / pH: x  Gluc: x / Ketone: Negative  / Bili: Negative / Urobili: Negative mg/dL   Blood: x / Protein: 30 mg/dL / Nitrite: Negative   Leuk Esterase: Trace / RBC: 0-2 /HPF / WBC 3-5   Sq Epi: x / Non Sq Epi: Occasional / Bacteria: Few                      MICROBIOLOGY:    RADIOLOGY & ADDITIONAL STUDIES:    < from: CT Chest No Cont (20 @ 22:33) >  IMPRESSION:     Scattered bilateral pulmonary nodules and thoracic lymphadenopathy keeping with patient's history of sarcoidosis. Continued surveillance recommended.    < end of copied text > PULMONARY CONSULT NOTE      RADHA BRANDN-[id number]    Patient is a 60y old  Female who presents with a chief complaint of Hypercalcemia (20 May 2020 08:30)      INTERVAL HPI/OVERNIGHT EVENTS: Patient follows with Dr. Duff for sarcoidosis. She recently completed a course of steroids. She denies SOB currently and at baseline. She complains of a mild occasional dry cough. She complains of decreased appetite and fatigue.    MEDICATIONS  (STANDING):  amLODIPine   Tablet 5 milliGRAM(s) Oral daily  dextrose 5%. 1000 milliLiter(s) (50 mL/Hr) IV Continuous <Continuous>  dextrose 50% Injectable 12.5 Gram(s) IV Push once  dextrose 50% Injectable 25 Gram(s) IV Push once  dextrose 50% Injectable 25 Gram(s) IV Push once  heparin   Injectable 5000 Unit(s) SubCutaneous every 12 hours  insulin lispro (HumaLOG) corrective regimen sliding scale   SubCutaneous three times a day before meals  predniSONE   Tablet 40 milliGRAM(s) Oral daily  sodium chloride 0.9%. 1000 milliLiter(s) (100 mL/Hr) IV Continuous <Continuous>      MEDICATIONS  (PRN):  acetaminophen   Tablet .. 650 milliGRAM(s) Oral every 6 hours PRN Temp greater or equal to 38C (100.4F), Mild Pain (1 - 3)  cyclobenzaprine 10 milliGRAM(s) Oral three times a day PRN Muscle Spasm  dextrose 40% Gel 15 Gram(s) Oral once PRN Blood Glucose LESS THAN 70 milliGRAM(s)/deciliter  glucagon  Injectable 1 milliGRAM(s) IntraMuscular once PRN Glucose LESS THAN 70 milligrams/deciliter      Allergies    Aspirin Adult Low Strength (Swelling)  penicillins (Swelling)    Intolerances        PAST MEDICAL & SURGICAL HISTORY:  Sarcoidosis  DM (diabetes mellitus)  HTN (hypertension)  Psoriasis  No significant past surgical history      FAMILY HISTORY:      SOCIAL HISTORY  Smoking History: never    REVIEW OF SYSTEMS:    CONSTITUTIONAL:  As per HPI.    HEENT:  Eyes:  No diplopia or blurred vision. ENT:  No earache, sore throat or runny nose.    CARDIOVASCULAR:  No pressure, squeezing, tightness, heaviness or aching about the chest; no palpitations.    RESPIRATORY:  No cough, shortness of breath, PND or orthopnea. Mild SOBOE    GASTROINTESTINAL:  No nausea, vomiting or diarrhea.    GENITOURINARY:  No dysuria, frequency or urgency.    MUSCULOSKELETAL:  No joint pains    SKIN:  No new lesions.    NEUROLOGIC:  No paresthesias, fasciculations, seizures or weakness.    PSYCHIATRIC:  No disorder of thought or mood.    ENDOCRINE:  No heat or cold intolerance, polyuria or polydipsia.    HEMATOLOGICAL:  No easy bruising or bleeding.     Vital Signs Last 24 Hrs  T(C): 36.6 (20 May 2020 07:30), Max: 36.6 (20 May 2020 07:30)  T(F): 97.8 (20 May 2020 07:30), Max: 97.8 (20 May 2020 07:30)  HR: 62 (20 May 2020 07:30) (62 - 75)  BP: 161/62 (20 May 2020 07:30) (117/75 - 161/62)  BP(mean): --  RR: 18 (20 May 2020 07:30) (16 - 18)  SpO2: 96% (20 May 2020 07:30) (96% - 98%)    PHYSICAL EXAMINATION:    GENERAL: The patient is a well-developed, well-nourished. in no apparent distress.     HEENT: Head is normocephalic and atraumatic. Extraocular muscles are intact. Mucous membranes are moist.     NECK: Supple.     LUNGS: Clear to auscultation without wheezing, rales, or rhonchi. Respirations unlabored on room air.    HEART: Regular rate and rhythm without murmur.    ABDOMEN: Soft, nontender, and nondistended.  No hepatosplenomegaly is noted.    EXTREMITIES: Without any cyanosis, clubbing, rash, lesions or edema.    NEUROLOGIC: Grossly intact.    SKIN: No ulceration or induration present.      LABS:                        8.3    7.08  )-----------( 310      ( 20 May 2020 07:23 )             26.5     05-20    143  |  109<H>  |  49.0<H>  ----------------------------<  88  4.0   |  22.0  |  2.55<H>    Ca    11.6<H>      20 May 2020 07:23  Phos  4.0     05-18  Mg     1.5     05-20    TPro  6.9  /  Alb  3.4  /  TBili  <0.2<L>  /  DBili  x   /  AST  13  /  ALT  7   /  AlkPhos  41  05-20      Urinalysis Basic - ( 18 May 2020 16:47 )    Color: Yellow / Appearance: Clear / S.015 / pH: x  Gluc: x / Ketone: Negative  / Bili: Negative / Urobili: Negative mg/dL   Blood: x / Protein: 30 mg/dL / Nitrite: Negative   Leuk Esterase: Trace / RBC: 0-2 /HPF / WBC 3-5   Sq Epi: x / Non Sq Epi: Occasional / Bacteria: Few                      MICROBIOLOGY:    RADIOLOGY & ADDITIONAL STUDIES:    < from: CT Chest No Cont (20 @ 22:33) >  IMPRESSION:     Scattered bilateral pulmonary nodules and thoracic lymphadenopathy keeping with patient's history of sarcoidosis. Continued surveillance recommended.    < end of copied text > PULMONARY CONSULT NOTE      RADHA BRANDN-[id number]    Patient is a 60y old  Female who presents with a chief complaint of Hypercalcemia (20 May 2020 08:30)      INTERVAL HPI/OVERNIGHT EVENTS: Patient follows with Dr. Sims for sarcoidosis. She recently completed a course of steroids. She denies SOB currently and at baseline. She complains of a mild occasional dry cough. She complains of decreased appetite and fatigue.    MEDICATIONS  (STANDING):  amLODIPine   Tablet 5 milliGRAM(s) Oral daily  dextrose 5%. 1000 milliLiter(s) (50 mL/Hr) IV Continuous <Continuous>  dextrose 50% Injectable 12.5 Gram(s) IV Push once  dextrose 50% Injectable 25 Gram(s) IV Push once  dextrose 50% Injectable 25 Gram(s) IV Push once  heparin   Injectable 5000 Unit(s) SubCutaneous every 12 hours  insulin lispro (HumaLOG) corrective regimen sliding scale   SubCutaneous three times a day before meals  predniSONE   Tablet 40 milliGRAM(s) Oral daily  sodium chloride 0.9%. 1000 milliLiter(s) (100 mL/Hr) IV Continuous <Continuous>      MEDICATIONS  (PRN):  acetaminophen   Tablet .. 650 milliGRAM(s) Oral every 6 hours PRN Temp greater or equal to 38C (100.4F), Mild Pain (1 - 3)  cyclobenzaprine 10 milliGRAM(s) Oral three times a day PRN Muscle Spasm  dextrose 40% Gel 15 Gram(s) Oral once PRN Blood Glucose LESS THAN 70 milliGRAM(s)/deciliter  glucagon  Injectable 1 milliGRAM(s) IntraMuscular once PRN Glucose LESS THAN 70 milligrams/deciliter      Allergies    Aspirin Adult Low Strength (Swelling)  penicillins (Swelling)    Intolerances        PAST MEDICAL & SURGICAL HISTORY:  Sarcoidosis  DM (diabetes mellitus)  HTN (hypertension)  Psoriasis  No significant past surgical history      FAMILY HISTORY:      SOCIAL HISTORY  Smoking History: never    REVIEW OF SYSTEMS:    CONSTITUTIONAL:  As per HPI.    HEENT:  Eyes:  No diplopia or blurred vision. ENT:  No earache, sore throat or runny nose.    CARDIOVASCULAR:  No pressure, squeezing, tightness, heaviness or aching about the chest; no palpitations.    RESPIRATORY:  No cough, shortness of breath, PND or orthopnea. Mild SOBOE    GASTROINTESTINAL:  No nausea, vomiting or diarrhea.    GENITOURINARY:  No dysuria, frequency or urgency.    MUSCULOSKELETAL:  No joint pains    SKIN:  No new lesions.    NEUROLOGIC:  No paresthesias, fasciculations, seizures or weakness.    PSYCHIATRIC:  No disorder of thought or mood.    ENDOCRINE:  No heat or cold intolerance, polyuria or polydipsia.    HEMATOLOGICAL:  No easy bruising or bleeding.     Vital Signs Last 24 Hrs  T(C): 36.6 (20 May 2020 07:30), Max: 36.6 (20 May 2020 07:30)  T(F): 97.8 (20 May 2020 07:30), Max: 97.8 (20 May 2020 07:30)  HR: 62 (20 May 2020 07:30) (62 - 75)  BP: 161/62 (20 May 2020 07:30) (117/75 - 161/62)  BP(mean): --  RR: 18 (20 May 2020 07:30) (16 - 18)  SpO2: 96% (20 May 2020 07:30) (96% - 98%)    PHYSICAL EXAMINATION:    GENERAL: The patient is a well-developed, well-nourished. in no apparent distress.     HEENT: Head is normocephalic and atraumatic. Extraocular muscles are intact. Mucous membranes are moist.     NECK: Supple.     LUNGS: Clear to auscultation without wheezing, rales, or rhonchi. Respirations unlabored on room air.    HEART: Regular rate and rhythm without murmur.    ABDOMEN: Soft, nontender, and nondistended.  No hepatosplenomegaly is noted.    EXTREMITIES: Without any cyanosis, clubbing, rash, lesions or edema.    NEUROLOGIC: Grossly intact.    SKIN: No ulceration or induration present.      LABS:                        8.3    7.08  )-----------( 310      ( 20 May 2020 07:23 )             26.5     05-20    143  |  109<H>  |  49.0<H>  ----------------------------<  88  4.0   |  22.0  |  2.55<H>    Ca    11.6<H>      20 May 2020 07:23  Phos  4.0     05-18  Mg     1.5     05-20    TPro  6.9  /  Alb  3.4  /  TBili  <0.2<L>  /  DBili  x   /  AST  13  /  ALT  7   /  AlkPhos  41  05-20      Urinalysis Basic - ( 18 May 2020 16:47 )    Color: Yellow / Appearance: Clear / S.015 / pH: x  Gluc: x / Ketone: Negative  / Bili: Negative / Urobili: Negative mg/dL   Blood: x / Protein: 30 mg/dL / Nitrite: Negative   Leuk Esterase: Trace / RBC: 0-2 /HPF / WBC 3-5   Sq Epi: x / Non Sq Epi: Occasional / Bacteria: Few                      MICROBIOLOGY:    RADIOLOGY & ADDITIONAL STUDIES:    < from: CT Chest No Cont (20 @ 22:33) >  IMPRESSION:     Scattered bilateral pulmonary nodules and thoracic lymphadenopathy keeping with patient's history of sarcoidosis. Continued surveillance recommended.    < end of copied text >

## 2020-05-20 NOTE — CONSULT NOTE ADULT - ASSESSMENT
Pt is a 60 year old female with PMHx sarcoidosis, HTN, DM, and psoriasis admitted for hypercalcemia and MALIKA. CT chest with b/l pulmonary nodules and thoracic lymphadenopathy, consistent with sarcoidosis. PFTs from 09/2019 reviewed, WNL.    Plan - no acute change in respiratory status. Can d/c home with follow up with Dr. Duff Pt is a 60 year old female with PMHx sarcoidosis, HTN, DM, and psoriasis admitted for hypercalcemia and MALIKA. CT chest with b/l pulmonary nodules and thoracic lymphadenopathy, consistent with sarcoidosis. PFTs from 09/2019 reviewed, WNL.    Plan - Patient saturating well without supplemental oxygen. No acute change in respiratory status. Can d/c home with follow up with Dr. Duff Pt is a 60 year old female with PMHx sarcoidosis, HTN, DM, and psoriasis admitted for hypercalcemia and MALIKA. CT chest with b/l pulmonary nodules and thoracic lymphadenopathy, consistent with sarcoidosis. PFTs from 09/2019 reviewed, WNL.    Plan - Patient saturating well without supplemental oxygen. No evidence of active lung disease or acute change in respiratory status. Can d/c home with follow up with Dr. Duff Pt is a 60 year old female with PMHx sarcoidosis, HTN, DM, and psoriasis admitted for hypercalcemia and MALIKA. CT chest with b/l pulmonary nodules and thoracic lymphadenopathy, consistent with sarcoidosis. PFTs from 09/2019 reviewed, WNL.    Plan - Patient saturating well without supplemental oxygen. No evidence of active lung disease or acute change in respiratory status. Can d/c home with follow up with Dr. Sims

## 2020-05-20 NOTE — PROGRESS NOTE ADULT - ASSESSMENT
Pt is a 60 year old female with PMH :  sarcoidosis, HTN, DM, and psoriasis admitted for hypercalcemia and MALIKA.     CT chest with b/l pulmonary nodules and thoracic lymphadenopathy, consistent with sarcoidosis.     PFTs from 09/2019 reviewed, WNL.    Patient saturating well without supplemental oxygen.     No evidence of active lung disease or acute change in respiratory status.     Hypercalcemia & MALIKA Improving,   KDIGO Care Bundle:    Avoidance of nephrotoxins/nephrotoxin medication adjustment  Medication dosage adjustment for kidney function  Continuous IVF administration (guided by CVP and testing of fluid responsiveness for 6 hours in combination with nephrology consultation)  Discontinuation of ACE/ARBs for first 48 postoperative hours  Close monitoring of serum Creatinine and UO  Acid-base, electrolyte and albumin status management  Avoidance of hyperglycemia for first 72 postoperative hours  Consider alternatives to radiocontrast administration  Optimizing hemodynamics       To Replete Mg ++, no

## 2020-05-20 NOTE — PROGRESS NOTE ADULT - SUBJECTIVE AND OBJECTIVE BOX
Vital Signs Last 24 Hrs,    T(C): 36.6 (20 May 2020 07:30), Max: 36.6 (20 May 2020 07:30)  T(F): 97.8 (20 May 2020 07:30), Max: 97.8 (20 May 2020 07:30)  HR: 62 (20 May 2020 07:30) (62 - 75)  BP: 161/62 (20 May 2020 07:30) (117/75 - 161/62)  RR: 18 (20 May 2020 07:30) (16 - 18)  SpO2: 96% (20 May 2020 07:30) (96% - 98%)    143    |  109<H>  |  49.0<H>  ----------------------------<  88     Ca:11.6<H> (20 May 2020 07:23)  4.0     |  22.0   |  2.55<H>    eGFR if Non : 20 <L>  eGFR if : 23 <L>    TPro  6.9    /  Alb  3.4    /  TBili  <0.2<L>  /  DBili  x      /  AST  13     /  ALT  7      /  AlkPhos  41     20 May 2020 07:23                         8.3<L>  7.08  )-----------( 310      ( 20 May 2020 07:23 )             26.5<L>    M.5 mg/dL<L>     Urinalysis Basic - ( 18 May 2020 16:47 )  Color: Yellow / Appearance: Clear / S.015 / pH: x  Gluc: x / Ketone: Negative  / Bili: Negative / Urobili: Negative mg/dL   Blood: x / Protein: 30 mg/dL<!> / Nitrite: Negative   Leuk Esterase: Trace<!> / RBC: 0-2 /HPF / WBC 3-5   Sq Epi: x / Non Sq Epi: Occasional / Bacteria: Few<!>    UCr:90 mg/dL   Micaela:51 mmol/L UCl:45 mmol/L UK:39 mmol/L ( @ 16:46)    [facility] DIVISION OF KIDNEY DISEASES AND HYPERTENSION -- FOLLOW UP NOTE  --------------------------------------------------------------------------------  Chief Complaint: hypercalcemia, moisés    24 hour events/subjective:  s/p IV hydration, calcitonin 2 doses and prednisone 40 mg   Pt seen and examined; feels well     PAST HISTORY  --------------------------------------------------------------------------------  No significant changes to PMH, PSH, FHx, SHx, unless otherwise noted    ALLERGIES & MEDICATIONS  --------------------------------------------------------------------------------  Allergies    Aspirin Adult Low Strength (Swelling)  penicillins (Swelling)    Standing Inpatient Medications  dextrose 5%. 1000 milliLiter(s) IV Continuous <Continuous>  dextrose 50% Injectable 12.5 Gram(s) IV Push once  dextrose 50% Injectable 25 Gram(s) IV Push once  dextrose 50% Injectable 25 Gram(s) IV Push once  heparin   Injectable 5000 Unit(s) SubCutaneous every 12 hours  insulin lispro (HumaLOG) corrective regimen sliding scale   SubCutaneous three times a day before meals  predniSONE   Tablet 40 milliGRAM(s) Oral daily  sodium chloride 0.9%. 1000 milliLiter(s) IV Continuous <Continuous>    PRN Inpatient Medications  acetaminophen   Tablet .. 650 milliGRAM(s) Oral every 6 hours PRN  cyclobenzaprine 10 milliGRAM(s) Oral three times a day PRN  dextrose 40% Gel 15 Gram(s) Oral once PRN  glucagon  Injectable 1 milliGRAM(s) IntraMuscular once PRN    REVIEW OF SYSTEMS  --------------------------------------------------------------------------------  Gen: No weight changes,  fevers/chill  Skin: No rashes  Head/Eyes/Ears/Mouth: No headache; Normal hearing; Normal vision w/o blurriness  Respiratory: No dyspnea, cough, wheezing, hemoptysis  CV: No chest pain, PND, orthopnea  GI: No abdominal pain, diarrhea, constipation+  : No increased frequency, dysuria, hematuria, nocturia  MSK: No joint pain/swelling; no back pain; no edema  Neuro: No dizziness/lightheadedness, weakness, seizures, numbness, tingling  Heme: No easy bruising or bleeding  Endo: No heat/cold intolerance  Psych: No significant nervousness, anxiety, stress, depression    All other systems were reviewed and are negative, except as noted.    VITALS/PHYSICAL EXAM  --------------------------------------------------------------------------------  T(C): 36.7 (20 @ 04:17), Max: 36.8 (20 @ 14:08)  HR: 88 (20 @ 04:17) (75 - 92)  BP: 153/82 (20 @ 04:17) (127/69 - 153/82)  RR: 18 (20 @ 04:17) (18 - 18)  SpO2: 97% (20 @ 04:17) (95% - 98%)  Height (cm): 157.48 (20 @ 14:08)  Weight (kg): 86.2 (20 @ 14:08)  BMI (kg/m2): 34.8 (20 @ 14:08)  BSA (m2): 1.87 (20 @ 14:08)    Physical Exam:  	Gen: NAD,   	HEENT: supple neck, clear oropharynx  	Pulm: CTA B/L  	CV: RRR, S1S2; no rub  	Back: No spinal or CVA tenderness; no sacral edema  	Abd: +BS, soft, nontender/nondistended  	: No suprapubic tenderness  	UE: Warm,  no edema; no asterixis  	LE: Warm,  no edema  	Neuro: No focal deficits  	Psych: Normal affect and mood  	Skin: Warm,      LABS/STUDIES  --------------------------------------------------------------------------------              8.7    5.30  >-----------<  307      [20 08:16]              27.5     143  |  108  |  43.0  ----------------------------<  136      [20 08:16]  4.3   |  21.0  |  2.93        Ca     12.4     [20 08:16]      iCa    1.92     [ 03:45]      Phos  4.0     [20 16:41]    TPro  7.7  /  Alb  3.5  /  TBili  0.2  /  DBili  x   /  AST  12  /  ALT  7   /  AlkPhos  46  [20 08:16]          [20 08:16]  Serum Osmolality 332      [20 16:39]    Creatinine Trend:  SCr 2.93 [ 08:16]  SCr 3.68 [ 16:41]    Urinalysis - [20 16:47]      Color Yellow / Appearance Clear / SG 1.015 / pH 6.0      Gluc Negative / Ketone Negative  / Bili Negative / Urobili Negative       Blood Trace / Protein 30 / Leuk Est Trace / Nitrite Negative      RBC 0-2 / WBC 3-5 / Hyaline  / Gran  / Sq Epi  / Non Sq Epi Occasional / Bacteria Few    Urine Creatinine 90      [20 16:46]  Urine Sodium 51      [05-18-20 @ 16:46]  Urine Potassium 39      [20 @ 16:46]  Urine Chloride 45      [20 @ 16:46]  Urine Osmolality 376      [20 @ 16:45]    PTH -- (Ca 14.9)      [20 @ 22:10]   17  Vitamin D (25OH) 63.2      [20 @ 22:10]    HIV Nonreact      [20 @ 08:16]    EXAM:  US KIDNEY(S)                          PROCEDURE DATE:  2020      INTERPRETATION:  CLINICAL INFORMATION: Renal insufficiency.    COMPARISON: CT chest 2020    TECHNIQUE: Sonography of the kidneys.     FINDINGS:    Right kidney: 10.3 cm. No hydronephrosis. 9 mm cyst in the mid kidney. Echogenic focus in the lower pole measuring 4 mm, possibly a nonobstructing calculus.    Left kidney:  10.2 cm. No renal mass, hydronephrosis or calculi.    IMPRESSION:     No hydronephrosis.    RENEE AMAYA M.D., ATTENDING RADIOLOGIST  This document has been electronically signed. May 19 2020  9:54AM      EXAM:  CT CHEST                          PROCEDURE DATE:  2020        INTERPRETATION:  CLINICAL INFORMATION: Sarcoidosis    COMPARISON: None.    PROCEDURE:   CT of the Chest was performed without intravenous contrast.  Sagittal and coronal reformats were performed.    FINDINGS:    LUNGS AND AIRWAYS: Patent central airways.  Scattered bilateral pulmonary nodules measuring under 5 mm in size, likely in keeping with patient's history of sarcoidosis.    PLEURA: No pleural effusion.    MEDIASTINUM AND ALEXIA: Calcified mediastinal and hilar lymphadenopathy in keeping with patient's history of sarcoidosis.    VESSELS: Within normal limits.    HEART: Heart size is normal. No pericardial effusion.    CHEST WALL AND LOWER NECK: Prominent bilateral axillary lymph nodes have a benign reniform shape.    VISUALIZED UPPER ABDOMEN: Surgical clips in the left suprarenal fossa. Right adrenal myelolipoma measures 2.5 cm.    BONES: Degenerative changes of the spine    IMPRESSION:     Scattered bilateral pulmonary nodules and thoracic lymphadenopathy keeping with patient's history of sarcoidosis. Continued surveillance recommended.    1) Hypercalcemia  2) Acute kidney injury  3) Sarcoidosis with pulmonary nodules  4) HTN    H/o hypercalcemia previously attributed to Sarcoidosis; tapered off steroids  Hypercalcemia likely from flare up of sarcoidosis; pt was also on high dose Vitamin D - 1-25(OH)2 vitamin D high at 131  Also with moisés in setting of dehydration from hypercalcemia  s/p IV hydration and 2 doses of calcitonin  SCa++ improving; continue Iv hydration- decrease rate to 100 cc/hr  Monitor for volume overload; may give Lasix 40 mg IV prn  Continue Prednisone 40 mg daily- will taper off as outpt  Continue to hold Metformin, ACE-I and vitamin D   CT chest noted- pulm follow up outpt  Renal US with no hydronephrosis  Monitor Scr, lytes, UOP

## 2020-05-24 LAB — PTH RELATED PROT SERPL-MCNC: <2 PMOL/L — SIGNIFICANT CHANGE UP

## 2020-05-29 PROCEDURE — 82652 VIT D 1 25-DIHYDROXY: CPT

## 2020-05-29 PROCEDURE — 84165 PROTEIN E-PHORESIS SERUM: CPT

## 2020-05-29 PROCEDURE — 86703 HIV-1/HIV-2 1 RESULT ANTBDY: CPT

## 2020-05-29 PROCEDURE — 83036 HEMOGLOBIN GLYCOSYLATED A1C: CPT

## 2020-05-29 PROCEDURE — 86803 HEPATITIS C AB TEST: CPT

## 2020-05-29 PROCEDURE — 83615 LACTATE (LD) (LDH) ENZYME: CPT

## 2020-05-29 PROCEDURE — 96361 HYDRATE IV INFUSION ADD-ON: CPT

## 2020-05-29 PROCEDURE — 36415 COLL VENOUS BLD VENIPUNCTURE: CPT

## 2020-05-29 PROCEDURE — 82962 GLUCOSE BLOOD TEST: CPT

## 2020-05-29 PROCEDURE — 84133 ASSAY OF URINE POTASSIUM: CPT

## 2020-05-29 PROCEDURE — T1013: CPT

## 2020-05-29 PROCEDURE — 84300 ASSAY OF URINE SODIUM: CPT

## 2020-05-29 PROCEDURE — 82310 ASSAY OF CALCIUM: CPT

## 2020-05-29 PROCEDURE — 84100 ASSAY OF PHOSPHORUS: CPT

## 2020-05-29 PROCEDURE — 96360 HYDRATION IV INFUSION INIT: CPT

## 2020-05-29 PROCEDURE — 83935 ASSAY OF URINE OSMOLALITY: CPT

## 2020-05-29 PROCEDURE — 83735 ASSAY OF MAGNESIUM: CPT

## 2020-05-29 PROCEDURE — U0003: CPT

## 2020-05-29 PROCEDURE — 83930 ASSAY OF BLOOD OSMOLALITY: CPT

## 2020-05-29 PROCEDURE — 85027 COMPLETE CBC AUTOMATED: CPT

## 2020-05-29 PROCEDURE — 99285 EMERGENCY DEPT VISIT HI MDM: CPT | Mod: 25

## 2020-05-29 PROCEDURE — 83970 ASSAY OF PARATHORMONE: CPT

## 2020-05-29 PROCEDURE — 71046 X-RAY EXAM CHEST 2 VIEWS: CPT

## 2020-05-29 PROCEDURE — 76775 US EXAM ABDO BACK WALL LIM: CPT

## 2020-05-29 PROCEDURE — 93005 ELECTROCARDIOGRAM TRACING: CPT

## 2020-05-29 PROCEDURE — 82570 ASSAY OF URINE CREATININE: CPT

## 2020-05-29 PROCEDURE — 82164 ANGIOTENSIN I ENZYME TEST: CPT

## 2020-05-29 PROCEDURE — 82330 ASSAY OF CALCIUM: CPT

## 2020-05-29 PROCEDURE — 71250 CT THORAX DX C-: CPT

## 2020-05-29 PROCEDURE — 84155 ASSAY OF PROTEIN SERUM: CPT

## 2020-05-29 PROCEDURE — 86334 IMMUNOFIX E-PHORESIS SERUM: CPT

## 2020-05-29 PROCEDURE — 83519 RIA NONANTIBODY: CPT

## 2020-05-29 PROCEDURE — 82306 VITAMIN D 25 HYDROXY: CPT

## 2020-05-29 PROCEDURE — 80074 ACUTE HEPATITIS PANEL: CPT

## 2020-05-29 PROCEDURE — 81001 URINALYSIS AUTO W/SCOPE: CPT

## 2020-05-29 PROCEDURE — 80053 COMPREHEN METABOLIC PANEL: CPT

## 2020-05-29 PROCEDURE — 82340 ASSAY OF CALCIUM IN URINE: CPT

## 2020-05-29 PROCEDURE — 82436 ASSAY OF URINE CHLORIDE: CPT

## 2020-05-29 PROCEDURE — 83521 IG LIGHT CHAINS FREE EACH: CPT

## 2020-06-08 ENCOUNTER — EMERGENCY (EMERGENCY)
Facility: HOSPITAL | Age: 60
LOS: 1 days | Discharge: HOME | End: 2020-06-08
Attending: EMERGENCY MEDICINE
Payer: COMMERCIAL

## 2020-06-08 VITALS
WEIGHT: 190.04 LBS | OXYGEN SATURATION: 98 % | DIASTOLIC BLOOD PRESSURE: 84 MMHG | SYSTOLIC BLOOD PRESSURE: 138 MMHG | HEART RATE: 88 BPM | RESPIRATION RATE: 18 BRPM | TEMPERATURE: 99 F

## 2020-06-08 PROCEDURE — 99284 EMERGENCY DEPT VISIT MOD MDM: CPT

## 2020-06-08 PROCEDURE — 71046 X-RAY EXAM CHEST 2 VIEWS: CPT | Mod: 26

## 2020-06-08 RX ORDER — DIAZEPAM 5 MG
5 TABLET ORAL ONCE
Refills: 0 | Status: DISCONTINUED | OUTPATIENT
Start: 2020-06-08 | End: 2020-06-08

## 2020-06-08 NOTE — ED ADULT TRIAGE NOTE - CHIEF COMPLAINT QUOTE
Patient is alert and oriented x3 c/o upper back pain raditing to jason shoulder blades since yesterday intermittently. denies chest pain or sob. breathing unlabored. denies numbness or tingling to any or all extremities. denies trauma or heavy lifting to the area.

## 2020-06-08 NOTE — ED PROVIDER NOTE - GASTROINTESTINAL NEGATIVE STATEMENT, MLM
no abrasions, no jaundice, no lesions, no pruritis, and no rashes.
no abdominal pain, no bloating, no constipation, no diarrhea, no nausea and no vomiting.

## 2020-06-08 NOTE — ED PROVIDER NOTE - PATIENT PORTAL LINK FT
You can access the FollowMyHealth Patient Portal offered by [facility] by registering at the following website: [email or web address]. By joining [employer]’s FollowMyHealth portal, you will also be able to view your health information using other applications (apps) compatible with our system.

## 2020-06-08 NOTE — ED PROVIDER NOTE - CLINICAL SUMMARY MEDICAL DECISION MAKING FREE TEXT BOX
EKG, Chest xray, Lab Work, Reassess EKG, Chest xray, Lab Work, Reassess  ekg SR 80 no signs acute ischemia

## 2020-06-08 NOTE — ED PROVIDER NOTE - OBJECTIVE STATEMENT
Patient is a 61 y/o female with a pmhx of sacroidosis, HTN, DM presenting to the ER for evaluation. pt states experiencing upper back pain for the past day, no injuries or trauma to the area, worse with walking. pt states feels lethargic more than usual. pt states was recently admitted to [facility] for hypercalcemia, was on steroids which she finished two days ago, has appointment with nephrologist tomorrow. pt states she felt sob two hours ago. pt denies cp, palpitations, fevers, nausea, vomiting, back pain, dysuria

## 2020-06-09 ENCOUNTER — APPOINTMENT (OUTPATIENT)
Dept: NEPHROLOGY | Facility: CLINIC | Age: 60
End: 2020-06-09
Payer: MEDICAID

## 2020-06-09 VITALS
RESPIRATION RATE: 20 BRPM | SYSTOLIC BLOOD PRESSURE: 118 MMHG | HEART RATE: 77 BPM | TEMPERATURE: 98 F | OXYGEN SATURATION: 100 % | DIASTOLIC BLOOD PRESSURE: 76 MMHG

## 2020-06-09 VITALS
HEART RATE: 125 BPM | WEIGHT: 185 LBS | DIASTOLIC BLOOD PRESSURE: 91 MMHG | BODY MASS INDEX: 34.04 KG/M2 | SYSTOLIC BLOOD PRESSURE: 150 MMHG | HEIGHT: 62 IN

## 2020-06-09 LAB
ALBUMIN SERPL ELPH-MCNC: 4.1 G/DL — SIGNIFICANT CHANGE UP (ref 3.3–5.2)
ALBUMIN SERPL ELPH-MCNC: 4.3 G/DL
ALP SERPL-CCNC: 56 U/L — SIGNIFICANT CHANGE UP (ref 40–120)
ALT FLD-CCNC: 11 U/L — SIGNIFICANT CHANGE UP
ANION GAP SERPL CALC-SCNC: 13 MMOL/L
ANION GAP SERPL CALC-SCNC: 14 MMOL/L — SIGNIFICANT CHANGE UP (ref 5–17)
AST SERPL-CCNC: 10 U/L — SIGNIFICANT CHANGE UP
BASOPHILS # BLD AUTO: 0.01 K/UL — SIGNIFICANT CHANGE UP (ref 0–0.2)
BASOPHILS NFR BLD AUTO: 0.1 % — SIGNIFICANT CHANGE UP (ref 0–2)
BILIRUB SERPL-MCNC: 0.7 MG/DL — SIGNIFICANT CHANGE UP (ref 0.4–2)
BUN SERPL-MCNC: 24 MG/DL — HIGH (ref 8–20)
BUN SERPL-MCNC: 41 MG/DL
CALCIUM SERPL-MCNC: 10.6 MG/DL
CALCIUM SERPL-MCNC: 10.6 MG/DL — HIGH (ref 8.6–10.2)
CHLORIDE SERPL-SCNC: 100 MMOL/L — SIGNIFICANT CHANGE UP (ref 98–107)
CHLORIDE SERPL-SCNC: 105 MMOL/L
CO2 SERPL-SCNC: 22 MMOL/L
CO2 SERPL-SCNC: 23 MMOL/L — SIGNIFICANT CHANGE UP (ref 22–29)
CREAT SERPL-MCNC: 1.38 MG/DL — HIGH (ref 0.5–1.3)
CREAT SERPL-MCNC: 1.62 MG/DL
EOSINOPHIL # BLD AUTO: 0.14 K/UL — SIGNIFICANT CHANGE UP (ref 0–0.5)
EOSINOPHIL NFR BLD AUTO: 1.5 % — SIGNIFICANT CHANGE UP (ref 0–6)
GLUCOSE SERPL-MCNC: 180 MG/DL — HIGH (ref 70–99)
GLUCOSE SERPL-MCNC: 58 MG/DL
HCT VFR BLD CALC: 34.8 % — SIGNIFICANT CHANGE UP (ref 34.5–45)
HGB BLD-MCNC: 10.9 G/DL — LOW (ref 11.5–15.5)
IMM GRANULOCYTES NFR BLD AUTO: 0.5 % — SIGNIFICANT CHANGE UP (ref 0–1.5)
LYMPHOCYTES # BLD AUTO: 0.69 K/UL — LOW (ref 1–3.3)
LYMPHOCYTES # BLD AUTO: 7.5 % — LOW (ref 13–44)
MCHC RBC-ENTMCNC: 26.5 PG — LOW (ref 27–34)
MCHC RBC-ENTMCNC: 31.3 GM/DL — LOW (ref 32–36)
MCV RBC AUTO: 84.7 FL — SIGNIFICANT CHANGE UP (ref 80–100)
MONOCYTES # BLD AUTO: 0.65 K/UL — SIGNIFICANT CHANGE UP (ref 0–0.9)
MONOCYTES NFR BLD AUTO: 7 % — SIGNIFICANT CHANGE UP (ref 2–14)
NEUTROPHILS # BLD AUTO: 7.7 K/UL — HIGH (ref 1.8–7.4)
NEUTROPHILS NFR BLD AUTO: 83.4 % — HIGH (ref 43–77)
NT-PROBNP SERPL-SCNC: 191 PG/ML — SIGNIFICANT CHANGE UP (ref 0–300)
PHOSPHATE SERPL-MCNC: 3.3 MG/DL
PLATELET # BLD AUTO: 190 K/UL — SIGNIFICANT CHANGE UP (ref 150–400)
POTASSIUM SERPL-MCNC: 3.7 MMOL/L — SIGNIFICANT CHANGE UP (ref 3.5–5.3)
POTASSIUM SERPL-SCNC: 3.7 MMOL/L — SIGNIFICANT CHANGE UP (ref 3.5–5.3)
POTASSIUM SERPL-SCNC: 5 MMOL/L
PROT SERPL-MCNC: 7.7 G/DL — SIGNIFICANT CHANGE UP (ref 6.6–8.7)
RBC # BLD: 4.11 M/UL — SIGNIFICANT CHANGE UP (ref 3.8–5.2)
RBC # FLD: 16.6 % — HIGH (ref 10.3–14.5)
SODIUM SERPL-SCNC: 136 MMOL/L — SIGNIFICANT CHANGE UP (ref 135–145)
SODIUM SERPL-SCNC: 141 MMOL/L
WBC # BLD: 9.24 K/UL — SIGNIFICANT CHANGE UP (ref 3.8–10.5)
WBC # FLD AUTO: 9.24 K/UL — SIGNIFICANT CHANGE UP (ref 3.8–10.5)

## 2020-06-09 PROCEDURE — 80053 COMPREHEN METABOLIC PANEL: CPT

## 2020-06-09 PROCEDURE — 99215 OFFICE O/P EST HI 40 MIN: CPT

## 2020-06-09 PROCEDURE — 85027 COMPLETE CBC AUTOMATED: CPT

## 2020-06-09 PROCEDURE — 93005 ELECTROCARDIOGRAM TRACING: CPT

## 2020-06-09 PROCEDURE — 99284 EMERGENCY DEPT VISIT MOD MDM: CPT | Mod: 25

## 2020-06-09 PROCEDURE — 83880 ASSAY OF NATRIURETIC PEPTIDE: CPT

## 2020-06-09 PROCEDURE — T1013: CPT

## 2020-06-09 PROCEDURE — 84484 ASSAY OF TROPONIN QUANT: CPT

## 2020-06-09 PROCEDURE — 82550 ASSAY OF CK (CPK): CPT

## 2020-06-09 PROCEDURE — 36415 COLL VENOUS BLD VENIPUNCTURE: CPT

## 2020-06-09 PROCEDURE — 71046 X-RAY EXAM CHEST 2 VIEWS: CPT

## 2020-06-09 PROCEDURE — 93010 ELECTROCARDIOGRAM REPORT: CPT

## 2020-06-09 RX ORDER — CHOLECALCIFEROL (VITAMIN D3) 1250 MCG
1.25 MG CAPSULE ORAL
Qty: 12 | Refills: 0 | Status: DISCONTINUED | COMMUNITY
Start: 2019-12-04 | End: 2020-06-09

## 2020-06-09 RX ADMIN — Medication 5 MILLIGRAM(S): at 00:39

## 2020-06-09 NOTE — HISTORY OF PRESENT ILLNESS
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 Pt was tapered off steroids in the past and was also on high dose vitamin D.\par Hypercalcemia was attributed to be likely from flare up of sarcoidosis; 1-25(OH)2 vitamin D ~131)\par s/p IV hydration and 2 doses of calcitonin, started on prednisone 40 mg daily\par Pt reports she was on 20 mg prednisone and has now stopped taking medicine 2 days ago\par Metformin,  ACE-I and vitamin D were also stopped\par \par \par \par She presents today for follow up\par She was in [facility] ED yesterday for upper back pain; was given valium 5 mg and d/c'ed home\par Labs showed improving scr ~1.3, Calcium of 10.6\par She has been on Noravsc 5 mg daily and Glipizide 5 mg\par Reports her BP has been high on Norvasc; also sugars are low (BS ~48 at home after she skipped a meal with Glipizide\par She has also been taking  high dose vitamin D

## 2020-06-09 NOTE — ASSESSMENT
[FreeTextEntry1] : 1) CKD III in the setting of HTN, DM, gout, NSAID use with superimposed MALIKA from hypercalcemia\par Scr improving\par \par 2) Hypercalcemia\par from sacroidosis flare\par s/p prednisone 40 mg in the hospital, 20 mg x2 weeks\par Her SCa++ remains high; but she is also on high dose vitamin D\par Instructed to stop Vitamin D ; continue prednisone 20 and repeat labs in 2 weeks\par \par 3) HTN\par BP elevated; previously well controlled on beta blocker and ACE-I combo\par will restart ACE-I now that MALIKA is resolved\par Continue Metoprolol \par d/c Amlodipine\par \par 4) DM\par off Metformin due to MALIKA\par Reports hypoglycemia on Glipizide\par Will decrease Glipizide to 2.5 mg daily\par Advised not to skip meals while  taking medication\par (approximately 10 to 15 mg/day). \par \par Repeat renal panel in 2 weeks\par \par RTC in 4 weeks\par \par

## 2020-06-09 NOTE — ED ADULT NURSE NOTE - NSIMPLEMENTINTERV_GEN_ALL_ED
Implemented All Universal Safety Interventions:  [place] to call system. Call bell, personal items and telephone within reach. Instruct patient to call for assistance. Room bathroom lighting operational. Non-slip footwear when patient is off stretcher. Physically safe environment: no spills, clutter or unnecessary equipment. Stretcher in lowest position, wheels locked, appropriate side rails in place.

## 2020-06-09 NOTE — REVIEW OF SYSTEMS
[Negative] : Respiratory [Fever] : no fever [Chills] : no chills [Feeling Tired] : feeling tired [Eye Pain] : no eye pain [Red Eyes] : eyes not red [Earache] : no earache [Loss Of Hearing] : no hearing loss [Heart Rate Is Slow] : the heart rate was not slow [Heart Rate Is Fast] : the heart rate was not fast [Lower Ext Edema] : no lower extremity edema [Shortness Of Breath] : no shortness of breath [Wheezing] : no wheezing [Cough] : no cough [SOB on Exertion] : no shortness of breath during exertion [Abdominal Pain] : no abdominal pain [Constipation] : no constipation [Diarrhea] : no diarrhea [Dysuria] : no dysuria [Arthralgias] : no arthralgias [Joint Pain] : no joint pain [Confused] : no confusion [Convulsions] : no convulsions [Dizziness] : no dizziness [Suicidal] : not suicidal [Sleep Disturbances] : no sleep disturbances [Anxiety] : no anxiety [Muscle Weakness] : no muscle weakness [Easy Bleeding] : no tendency for easy bleeding [Easy Bruising] : no tendency for easy bruising [de-identified] : psoriasis; LE lesions

## 2020-06-09 NOTE — ED ADULT NURSE NOTE - CHPI ED NUR SYMPTOMS NEG
no anorexia/no numbness/no bladder dysfunction/no constipation/no fatigue/no motor function loss/no difficulty bearing weight/no neck tenderness/no tingling/no bowel dysfunction

## 2020-06-09 NOTE — PHYSICAL EXAM
[General Appearance - Alert] : alert [General Appearance - In No Acute Distress] : in no acute distress [Sclera] : the sclera and conjunctiva were normal [Outer Ear] : the ears and nose were normal in appearance [Hearing Threshold Finger Rub Not [place]] : hearing was normal [Neck Appearance] : the appearance of the neck was normal [] : no respiratory distress [Respiration, Rhythm And Depth] : normal respiratory rhythm and effort [Auscultation Breath Sounds / Voice Sounds] : lungs were clear to auscultation bilaterally [Heart Rate And Rhythm] : heart rate was normal and rhythm regular [Heart Sounds] : normal S1 and S2 [Heart Sounds Gallop] : no gallops [Abdomen Soft] : soft [Abdomen Tenderness] : non-tender [No CVA Tenderness] : no ~M costovertebral angle tenderness [Abnormal Walk] : normal gait [Oriented To Time, Place, And Person] : oriented to person, place, and time [Strabismus] : no strabismus was seen [Edema] : there was no peripheral edema [No Focal Deficits] : no focal deficits

## 2020-06-18 ENCOUNTER — EMERGENCY (EMERGENCY)
Facility: HOSPITAL | Age: 60
LOS: 1 days | Discharge: HOME | End: 2020-06-18
Attending: EMERGENCY MEDICINE
Payer: COMMERCIAL

## 2020-06-18 VITALS
DIASTOLIC BLOOD PRESSURE: 90 MMHG | RESPIRATION RATE: 18 BRPM | OXYGEN SATURATION: 97 % | HEART RATE: 99 BPM | TEMPERATURE: 98 F | SYSTOLIC BLOOD PRESSURE: 146 MMHG | WEIGHT: 184.97 LBS | HEIGHT: 62 IN

## 2020-06-18 PROCEDURE — 73560 X-RAY EXAM OF KNEE 1 OR 2: CPT

## 2020-06-18 PROCEDURE — 99284 EMERGENCY DEPT VISIT MOD MDM: CPT

## 2020-06-18 PROCEDURE — 73560 X-RAY EXAM OF KNEE 1 OR 2: CPT | Mod: 26,LT,RT

## 2020-06-18 PROCEDURE — 82962 GLUCOSE BLOOD TEST: CPT

## 2020-06-18 RX ORDER — LIDOCAINE 4 G/100G
1 CREAM TOPICAL ONCE
Refills: 0 | Status: COMPLETED | OUTPATIENT
Start: 2020-06-18 | End: 2020-06-18

## 2020-06-18 RX ORDER — METHOCARBAMOL 500 MG/1
750 TABLET, FILM COATED ORAL ONCE
Refills: 0 | Status: COMPLETED | OUTPATIENT
Start: 2020-06-18 | End: 2020-06-18

## 2020-06-18 RX ORDER — LIDOCAINE 4 G/100G
2 CREAM TOPICAL
Qty: 10 | Refills: 0
Start: 2020-06-18 | End: 2020-06-22

## 2020-06-18 RX ADMIN — METHOCARBAMOL 750 MILLIGRAM(S): 500 TABLET, FILM COATED ORAL at 16:30

## 2020-06-18 RX ADMIN — LIDOCAINE 1 PATCH: 4 CREAM TOPICAL at 16:28

## 2020-06-18 NOTE — ED PROVIDER NOTE - PATIENT PORTAL LINK FT
You can access the FollowMyHealth Patient Portal offered by [facility] by registering at the following website: [email or web address]. By joining [employer]’s FollowMyHealth portal, you will also be able to view your health information using other applications (apps) compatible with our system.

## 2020-06-18 NOTE — PROVIDER CONTACT NOTE (OTHER) - RECOMMENDATIONS
Pt.  is advised and informed that the [facility] surgical supply rep. will contact them for the delivery once approved .

## 2020-06-18 NOTE — PROVIDER CONTACT NOTE (OTHER) - ASSESSMENT
[facility] met with pt. and pt. is requesting a walker to be delivered to her house once approved by her insurance. Pt. does not want to take walker now since she's unsure if her insurance will approve.  Pt. shared that she has the crutches now and her daughter is coming to pick her up. Pt. has elevator in her apt.

## 2020-06-18 NOTE — ED PROVIDER NOTE - ATTENDING CONTRIBUTION TO CARE
Sarahi MARAVILLA- 61 Y/O F with h/o chronic psoriatic arthritis, gout and recurrent colchicine injection sin b/l knee, dm, ckd p/w worsening b/l knee pain and now has to use crutches for 1 day due to significant pain. Pt has appointment with ortho soon but has got delayed due to COVID pandemic related appointment issues. No fall or trauma. Pt's nephrologist pedro kaiser want her to be in colchicine or nsaids any more. Pt already taking 10 mg prednisone per day    Pt is alert, well appearing female, s1s2 normal reg, b/l clear breath sounds, abd soft, nt, nd, chronic gouty changes to both knees, using crutches even to get from chair to bed, unable to bear jeromy, normal passive rom at both knees but significant pain with weight bearing neuro exam aox3, cn 2-12 intact, no focal deficits, skin warm, dry, good turgor, b/l knees - no redness noted, minimal warmth and swelling    xray of both knees vasquez and appears pt has advanced arthritis with bone on bone changes and loss of joint space, pt offered PT eval but she does not want o stay today and will try muscle relaxers and meds and will return tmrw morning for possible PT eval so can a walker or cane can be provided as crutches are not long term solution

## 2020-06-18 NOTE — ED PROVIDER NOTE - OBJECTIVE STATEMENT
59 y/o F with PMH of sarcoidosis, psoriatic arthritis, DM c/o pain in the right lateral knee, and the left medial knee x 1 week.  Patient has been taking tylenol with no relief.  Denies fever, or injury.  She states that she's had this problem for years, and usually sees an orthopedist for local steroid injections.  Patient has appointment with ortho on 6/25.

## 2020-06-18 NOTE — ED ADULT TRIAGE NOTE - CHIEF COMPLAINT QUOTE
pt presents to ed a&ox3, no acute distress, ambulatory with own personal crutches. c/p increasing pain to b/l LE from chronic arthritis and R sided headache. denies any fall or injury.

## 2020-06-25 ENCOUNTER — APPOINTMENT (OUTPATIENT)
Dept: ORTHOPEDIC SURGERY | Facility: CLINIC | Age: 60
End: 2020-06-25
Payer: MEDICAID

## 2020-06-25 VITALS
SYSTOLIC BLOOD PRESSURE: 109 MMHG | WEIGHT: 184 LBS | HEIGHT: 62 IN | HEART RATE: 78 BPM | DIASTOLIC BLOOD PRESSURE: 74 MMHG | BODY MASS INDEX: 33.86 KG/M2

## 2020-06-25 PROCEDURE — 20610 DRAIN/INJ JOINT/BURSA W/O US: CPT | Mod: LT

## 2020-06-25 PROCEDURE — 99214 OFFICE O/P EST MOD 30 MIN: CPT | Mod: 25

## 2020-06-25 PROCEDURE — 73564 X-RAY EXAM KNEE 4 OR MORE: CPT | Mod: LT,RT

## 2020-06-25 NOTE — ADDENDUM
[FreeTextEntry1] : This note was authored by Travis Rodríguez working as a medical scribe for Dr. Jarret Moreno. The note was reviewed, edited, and revised by Dr. Jarret Moreno whom is in agreement with the exam findings, imaging findings, and treatment plan. 06/25/2020.

## 2020-06-25 NOTE — HISTORY OF PRESENT ILLNESS
[de-identified] : RADHA is a 60 year old female who presents today for followup evaluation of left knee pain.  Patient has a history of sarcoidosis and is currently on prednisone 10 mg daily, she also has a history of gout but had to be taken off of her allopurinol due to side effects and poor kidney function as result of hypercalcemia. Patient has longstanding intermittent bilateral knee pain, today left is worse than right.  Her knee pain has gotten worse over the last 2 weeks.  She denies any falls or trauma. Left Knee pain is localized medially, right knee pain is localized laterally, and her pain is worse with all weightbearing activity. Patient has tried therapy and home exercise with no relief. Patient was seen in 2018 for right knee pain only and received a cortisone injection to the right knee.  She has not had an injection since this.  Patient cannot take NSAIDs due to kidney function, she takes Tylenol which is ineffective.\par

## 2020-06-25 NOTE — PHYSICAL EXAM
[de-identified] : The patient appears well nourished  and in no apparent distress.  The patient is alert and oriented to person, place, and time.   Affect and mood appear normal. The head is normocephalic and atraumatic.  The eyes reveal normal sclera and extra ocular muscles are intact. The tongue is midline with no apparent lesions.  Skin shows normal turgor with no evidence of eczema or psoriasis.  No respiratory distress noted.  Sensation grossly intact.\par   [de-identified] : Exam of the right knee shows -5 to 90 degrees of flexion.\par Exam of the left knee shows a mild to moderate effusion, -7 to 90 degrees of flexion.  5/5 motor strength bilaterally distally. Sensation intact distally.  [de-identified] : Xray- 4 views of the bilateral knees shows severe patellofemoral compartment arthritis of both knees.

## 2020-06-25 NOTE — PROCEDURE
[de-identified] : Patient received a Depo-Medrol Injection with Lidocaine injection using sterile technique to the Left Knee and tolerated well. \par Procedure: The patient has been identified by name and date of birth. Patient confirms that we are treating the left knee today.\par The knee was prepped in the usual sterile fashion. The areas were cleansed with chlorhexadine, then sprayed with ethyl chloride. The patient was then injected with the Depo-Medrol into the left knee. The patient tolerated the procedure well. The medication was delivered aseptically and atraumatically.\par Diagnosis: Osteoarthritis of the left knee\par Treatment: The patient was advised on the activities for today. I gave the patient instructions on postinjection ice and analgesia.\par

## 2020-06-25 NOTE — DISCUSSION/SUMMARY
[de-identified] : The patient is a 60 year old female with severe patellofemoral compartment arthritis of both knees.  Her left knee is more symptomatic currently and so this knee was injected with cortisone today.  We opted not to inject both knees because of her history of diabetes and the right knee is less symptomatic.  We discussed the use of over-the-counter anti-inflammatories as well as activity modification and ice as needed. She may follow up next week for a cortisone injection for the right knee if needed.

## 2020-07-02 ENCOUNTER — APPOINTMENT (OUTPATIENT)
Dept: ORTHOPEDIC SURGERY | Facility: CLINIC | Age: 60
End: 2020-07-02

## 2020-07-08 ENCOUNTER — APPOINTMENT (OUTPATIENT)
Dept: RHEUMATOLOGY | Facility: CLINIC | Age: 60
End: 2020-07-08
Payer: MEDICAID

## 2020-07-08 VITALS
BODY MASS INDEX: 33.86 KG/M2 | DIASTOLIC BLOOD PRESSURE: 70 MMHG | SYSTOLIC BLOOD PRESSURE: 122 MMHG | WEIGHT: 184 LBS | HEART RATE: 86 BPM | RESPIRATION RATE: 18 BRPM | HEIGHT: 62 IN | OXYGEN SATURATION: 97 %

## 2020-07-08 DIAGNOSIS — M25.562 PAIN IN LEFT KNEE: ICD-10-CM

## 2020-07-08 PROCEDURE — 99215 OFFICE O/P EST HI 40 MIN: CPT

## 2020-07-09 PROBLEM — M25.562 LEFT KNEE PAIN: Status: ACTIVE | Noted: 2020-06-25

## 2020-07-09 NOTE — HISTORY OF PRESENT ILLNESS
[___ Month(s) Ago] : [unfilled] month(s) ago [FreeTextEntry1] : - last seen 10/19\par - had increased L knee pain- saw ortho Dr. Mroeno, had injection w/ 80mg steroid w/ improvement, small effusion. XR done\par - previous hx of gout, affecting MTPs- has been off all ULT 2/2 allopurinol allergy, CVD/MI risk with Uloric, and CRI affecting probenecid which was stopped.  Lesinurad off market now and little to use for ULT, however reports no recent gout flares\par - did report L knee had some swelling, which may have been reactive 2/2 OA effusion\par - otherwise reports Ca levels have been stable since, last almost normal. Remains on prednisone 10mg/d, SOB at baseline, no inreases in DSOUZA or cough. Denies E nodosum, uveitis, myositis, new polyarthritis, headache, visual changes, palpitations, LE edema, or any new symptoms\par - Last CT chest stable w/o changes, planned for possible CPAP and believes SOB/DSOUZA is likely 2/2 weight and deconditioning, not active sarcoidosis, however persistent hypercalcemia which may be 2/2 CKD instead of granulomatous vitamin D conversion\par - has had free water intake increased for hypercalcemia likely 2/2 sarcoid with elevated 1,25-hydroxylation of D3 currently.\par - mild arthralgia of hands, shoulders constantly, reports 3-5/10 at times, but today < 2/10\par - not currently on any ULT, was on probenecid that she stopped.\par - Psoriasis inactive after UV treatments, no active plaques [None] : The patient is currently asymptomatic [de-identified] : \par \par All other ROS negative except as mentioned in HPI.

## 2020-07-09 NOTE — PHYSICAL EXAM
[General Appearance - In No Acute Distress] : in no acute distress [General Appearance - Alert] : alert [General Appearance - Well Developed] : well developed [General Appearance - Well Nourished] : well nourished [General Appearance - Well-Appearing] : healthy appearing [Sclera] : the sclera and conjunctiva were normal [Extraocular Movements] : extraocular movements were intact [Hearing Threshold Finger Rub Not [place]] : hearing was normal [Outer Ear] : the ears and nose were normal in appearance [Examination Of The Oral Cavity] : the lips and gums were normal [Nasal Cavity] : the nasal mucosa and septum were normal [Oropharynx] : the oropharynx was normal [Neck Appearance] : the appearance of the neck was normal [Neck Cervical Mass (___cm)] : no neck mass was observed [Jugular Venous Distention Increased] : there was no jugular-venous distention [Thyroid Diffuse Enlargement] : the thyroid was not enlarged [Thyroid Nodule] : there were no palpable thyroid nodules [Respiration, Rhythm And Depth] : normal respiratory rhythm and effort [Heart Rate And Rhythm] : heart rate was normal and rhythm regular [Heart Sounds] : normal S1 and S2 [Auscultation Breath Sounds / Voice Sounds] : lungs were clear to auscultation bilaterally [Heart Sounds Gallop] : no gallops [Murmurs] : no murmurs [Heart Sounds Pericardial Friction Rub] : no pericardial rub [Full Pulse] : the pedal pulses are present [Edema] : there was no peripheral edema [Veins - Varicosity Changes] : there were no varicosital changes [Bowel Sounds] : normal bowel sounds [Abdomen Soft] : soft [Abdomen Tenderness] : non-tender [Cervical Lymph Nodes Enlarged Posterior Bilaterally] : posterior cervical [Cervical Lymph Nodes Enlarged Anterior Bilaterally] : anterior cervical [Abdomen Mass (___ Cm)] : no abdominal mass palpated [Supraclavicular Lymph Nodes Enlarged Bilaterally] : supraclavicular [No CVA Tenderness] : no ~M costovertebral angle tenderness [No Spinal Tenderness] : no spinal tenderness [Abnormal Walk] : normal gait [Nail Clubbing] : no clubbing  or cyanosis of the fingernails [Musculoskeletal - Swelling] : no joint swelling seen [FreeTextEntry1] : B/L patellofemoral crepitus in knees, no effusions, L knee w/ minimal tenderness and no effusion.  ROM all joints intact, no synovitis or tenderness. [Motor Tone] : muscle strength and tone were normal [Skin Color & Pigmentation] : normal skin color and pigmentation [Skin Turgor] : normal skin turgor [] : no rash [No Focal Deficits] : no focal deficits [Skin Lesions] : no skin lesions [Impaired Insight] : insight and judgment were intact [Affect] : the affect was normal [Oriented To Time, Place, And Person] : oriented to person, place, and time [Mood] : the mood was normal

## 2020-07-09 NOTE — ASSESSMENT
[FreeTextEntry1] : 1. Gout - h/o R 1st MTP, reaction to allopurinol in past with facial rash/hypertensive.  No current gouty arthritis activity since first occurrence.  No treatment needed at this time for urate lowering therapy. If Uric acid levels > 12 or in setting of CKD or with repeat occurrences >2x/year will consider.  Uric acid last was 11.0, usually above this level I would recommend ULT, will recheck labs today for evaluation.  However, patient had reaction to allopurinol, so we cannot re-attempt use.  Furthermore, Uloric (febuxostat) now will be getting blackbox warnings of increased MI, so it is also not an option.  If her Cr is stable, I will want to start Lesinurad to lower her uric acid. She may also benefit from a weekly 0.6mg of colchicine for gout attack ppx while on a ULT, which should be continued for at least 3 months, with the attack risk lowering by 6 months after start of ULT\par Will plan to start Uloric- reviewed CAD/CVA risks, LighTouch low, recommended low dose.\par Was started on probenecid but stopped 2/2 renal function\par Currently off all ULT at this time. Last UA in 7s, goal < 6.0 however will not restart ULT at this time 2/2 rxn.\par 2. Sarcoidosis - mostly pulmonary involvement with SOB that has improved, received no treatment. CT chest with mediastinal and hilar LAD, confirmed with LN bx.  Episode of nephrolithiasis 2/2 calcium stone in past, unknown if hypercalcemic at this time.  No evidence of sarcoid arthropathy.  Stable disease on recent CT imaging, followed by pulmonary with normal PFTs and no changes. Now evidence hypercalcemia, but no other stigmata of sarcoid w/ stable pulmonary disease - no E nodosum, rash, inflammatory arthritis (other than L knee crystalline arthritis), uveitis.\par 3. Psoriasis - generalized psoriatic plaques now localized to b/l shins and knees with little erythema, uses topicals if needed and moisturizes/emollients. No evidence of psoriatic arthropathy.\par 4. B/L knee OA - XR with mod tricomp narrowing on XR. Stable disease without symptoms currently.\par 5. CKD - was taking ibuprofen frequently.  Now was told she needs to stop due to CKD.  Last uric acid 10.6 She should remain off NSAIDs. Agree about remaining off probenecid for now, last renal function GFR~60s with normal Cr\par 6. Hypercalcemia-  was elevated to 14- had to go to [facility].  Stable currently and almost normalized. Continues to follow w/ renal. Remains on prednisone 10mg/d which will aide in prevention of any sarcoid-related hypercalcemia\par 7. L knee pain - s/p injection w/ ortho, likely OA related, however history of gout but preceding sxs appear more chronically progressive with slight subacute onset.  UA not at goal due to allopurinol allergy. \par \par - labs\par - consider Uloric 40mg/d if needed\par - c/w prednisone 10mg/d\par - start duloxetine 20mg/d\par - pulm f/u\par - renal f/u and Ca monitoring\par \par RTO 12 weeks

## 2020-07-14 ENCOUNTER — APPOINTMENT (OUTPATIENT)
Dept: NEPHROLOGY | Facility: CLINIC | Age: 60
End: 2020-07-14
Payer: MEDICAID

## 2020-07-14 VITALS
DIASTOLIC BLOOD PRESSURE: 81 MMHG | HEIGHT: 62 IN | BODY MASS INDEX: 34.6 KG/M2 | SYSTOLIC BLOOD PRESSURE: 124 MMHG | WEIGHT: 188 LBS | HEART RATE: 105 BPM

## 2020-07-14 PROCEDURE — 99215 OFFICE O/P EST HI 40 MIN: CPT

## 2020-07-14 NOTE — ASSESSMENT
[FreeTextEntry1] : 1) CKD III in the setting of HTN, DM, gout, NSAID use with superimposed MALIKA from hypercalcemia\par Scr stable\par avoid NSAIDs\par \par 2) Hypercalcemia\par from sacroidosis flare; also was on high dose vitamin D\par On prednisone taper; continue prednisone 10 mg daily\par \par \par 3) HTN\par BP stable; continue current regimen (beta blocker and ACE-I) \par \par 4) DM\par off Metformin due to MALIKA\par Currently on Glipizide only\par Will obtain HbA1c on next visit\par \par RTC in 1 month\par \par

## 2020-07-14 NOTE — HISTORY OF PRESENT ILLNESS
[FreeTextEntry1] : Patient is a 60 year old woman with history of DM2, HTN, gout, sarcoidosis with pulmonary nodules,  psoriasis, history of L sided ?pheochromocytoma s/p removal 30 years ago admitted to [facility] for hypercalcemia and MALIKA. Pt was tapered off steroids in the past and was also on high dose vitamin D.\par Hypercalcemia was attributed to be likely from flare up of sarcoidosis; 1-25(OH)2 vitamin D ~131)\par s/p IV hydration and 2 doses of calcitonin, started on prednisone 40 mg daily\par Metformin,  ACE-I and vitamin D were also stopped. She has now been restarted on ACE-I, currently on prednisone taper. \par \par \par \par She presents today for follow up\par Feels well\par REcently saw Dr. Tobias; on prednisone 10\par Has been off metformin; taking Glipizide 2.5\par Sugars have been ranging in 130-160's post prandial; fastin 89-90\par

## 2020-07-14 NOTE — PHYSICAL EXAM
[General Appearance - Alert] : alert [Sclera] : the sclera and conjunctiva were normal [General Appearance - In No Acute Distress] : in no acute distress [Strabismus] : no strabismus was seen [Outer Ear] : the ears and nose were normal in appearance [Hearing Threshold Finger Rub Not [place]] : hearing was normal [Neck Appearance] : the appearance of the neck was normal [] : no respiratory distress [Respiration, Rhythm And Depth] : normal respiratory rhythm and effort [Auscultation Breath Sounds / Voice Sounds] : lungs were clear to auscultation bilaterally [Heart Rate And Rhythm] : heart rate was normal and rhythm regular [Heart Sounds] : normal S1 and S2 [Heart Sounds Gallop] : no gallops [Edema] : there was no peripheral edema [Abdomen Soft] : soft [Abdomen Tenderness] : non-tender [No CVA Tenderness] : no ~M costovertebral angle tenderness [Abnormal Walk] : normal gait [No Focal Deficits] : no focal deficits [Oriented To Time, Place, And Person] : oriented to person, place, and time

## 2020-07-14 NOTE — REVIEW OF SYSTEMS
[Negative] : Respiratory [Fever] : no fever [Chills] : no chills [Eye Pain] : no eye pain [Feeling Tired] : not feeling tired [Red Eyes] : eyes not red [Earache] : no earache [Loss Of Hearing] : no hearing loss [Heart Rate Is Slow] : the heart rate was not slow [Lower Ext Edema] : no lower extremity edema [Heart Rate Is Fast] : the heart rate was not fast [Shortness Of Breath] : no shortness of breath [Wheezing] : no wheezing [Cough] : no cough [SOB on Exertion] : no shortness of breath during exertion [Constipation] : no constipation [Abdominal Pain] : no abdominal pain [Dysuria] : no dysuria [Diarrhea] : no diarrhea [Arthralgias] : no arthralgias [Joint Pain] : no joint pain [Confused] : no confusion [Convulsions] : no convulsions [Dizziness] : no dizziness [Suicidal] : not suicidal [Sleep Disturbances] : no sleep disturbances [Anxiety] : no anxiety [Muscle Weakness] : no muscle weakness [Easy Bleeding] : no tendency for easy bleeding [Easy Bruising] : no tendency for easy bruising [de-identified] : psoriasis; LE lesions

## 2020-07-23 ENCOUNTER — APPOINTMENT (OUTPATIENT)
Dept: PULMONOLOGY | Facility: CLINIC | Age: 60
End: 2020-07-23

## 2020-08-15 ENCOUNTER — APPOINTMENT (OUTPATIENT)
Dept: DISASTER EMERGENCY | Facility: CLINIC | Age: 60
End: 2020-08-15

## 2020-08-16 LAB — SARS-COV-2 N GENE NPH QL NAA+PROBE: NOT DETECTED

## 2020-08-18 ENCOUNTER — APPOINTMENT (OUTPATIENT)
Dept: NEPHROLOGY | Facility: CLINIC | Age: 60
End: 2020-08-18

## 2020-08-20 ENCOUNTER — APPOINTMENT (OUTPATIENT)
Dept: PULMONOLOGY | Facility: CLINIC | Age: 60
End: 2020-08-20
Payer: MEDICAID

## 2020-08-20 VITALS — BODY MASS INDEX: 34.74 KG/M2 | HEIGHT: 61 IN | WEIGHT: 184 LBS

## 2020-08-20 PROCEDURE — 94727 GAS DIL/WSHOT DETER LNG VOL: CPT

## 2020-08-20 PROCEDURE — 94729 DIFFUSING CAPACITY: CPT

## 2020-08-20 PROCEDURE — 94010 BREATHING CAPACITY TEST: CPT

## 2020-08-20 PROCEDURE — 85018 HEMOGLOBIN: CPT | Mod: QW

## 2020-08-27 ENCOUNTER — APPOINTMENT (OUTPATIENT)
Dept: PULMONOLOGY | Facility: CLINIC | Age: 60
End: 2020-08-27

## 2020-08-31 ENCOUNTER — APPOINTMENT (OUTPATIENT)
Dept: PULMONOLOGY | Facility: CLINIC | Age: 60
End: 2020-08-31
Payer: MEDICAID

## 2020-08-31 VITALS
RESPIRATION RATE: 16 BRPM | HEIGHT: 62 IN | OXYGEN SATURATION: 97 % | BODY MASS INDEX: 33.86 KG/M2 | DIASTOLIC BLOOD PRESSURE: 80 MMHG | WEIGHT: 184 LBS | SYSTOLIC BLOOD PRESSURE: 124 MMHG | HEART RATE: 98 BPM

## 2020-08-31 PROCEDURE — 99215 OFFICE O/P EST HI 40 MIN: CPT

## 2020-08-31 RX ORDER — AMLODIPINE BESYLATE 5 MG/1
5 TABLET ORAL
Qty: 30 | Refills: 0 | Status: DISCONTINUED | COMMUNITY
Start: 2020-05-21 | End: 2020-08-31

## 2020-08-31 RX ORDER — GLIPIZIDE 5 MG/1
5 TABLET ORAL
Qty: 30 | Refills: 0 | Status: DISCONTINUED | COMMUNITY
Start: 2020-05-21 | End: 2020-08-31

## 2020-08-31 RX ORDER — METFORMIN HYDROCHLORIDE 500 MG/1
500 TABLET, COATED ORAL
Qty: 60 | Refills: 0 | Status: DISCONTINUED | COMMUNITY
Start: 2020-08-06

## 2020-08-31 RX ORDER — PREDNISONE 20 MG/1
20 TABLET ORAL
Qty: 14 | Refills: 0 | Status: DISCONTINUED | COMMUNITY
Start: 2020-05-21 | End: 2020-08-31

## 2020-08-31 NOTE — CONSULT LETTER
[Dear  ___] : Dear  [unfilled], [Consult Letter:] : I had the pleasure of evaluating your patient, [unfilled]. [Please see my note below.] : Please see my note below. [Consult Closing:] : Thank you very much for allowing me to participate in the care of this patient.  If you have any questions, please do not hesitate to contact me. [Sincerely,] : Sincerely, [DrYasmani  ___] : Dr. GASPAR [DrYasmani ___] : Dr. GASPAR [FreeTextEntry3] : Leland Sims MD, FCCP, D. ABSM\par Pulmonary and Sleep Medicine\par [facility] [place]\par \par

## 2020-08-31 NOTE — HISTORY OF PRESENT ILLNESS
[Excess Weight] : excess weight [Dyspnea] : dyspnea [Joint Pain] : joint pain [Low Calorie Diet] : low calorie diet [Fair Compliance] : fair compliance with treatment [Fair Tolerance] : fair tolerance of treatment [Diabetes] : diabetes [Hypertension] : hypertension [High] : high [Low Calorie] : low calorie [Well Balanced Diet] : well balanced meals [Infrequently] : exercises infrequently [Walking] : walking [Follow-Up - Routine Clinic] : a routine clinic follow-up of [Excessive Daytime Sleepiness] : excessive daytime sleepiness [Snoring] : snoring [Unrefreshing Sleep] : unrefreshing sleep [Sleepy When Sedentary] : sleepy when sedentary [Currently Experiencing] : The patient is currently experiencing symptoms. [None] : No associated symptoms are reported [CPAP] : CPAP [Poor Compliance] : poor compliance with treatment [Poor Tolerance] : poor tolerance of treatment [Poor Symptom Control] : poor symptom control [FreeTextEntry1] : The patient recently suffered an ankle injury and was on crutches and a wheelchair but now is walking without support.\par She is followed by hematology for anemia.\par She is on prednisone per rheumatology for hypercalcemia. [Witnessed Apnea During Sleep] : no witnessed apnea during sleep [Witnessed Gasping During Sleep] : no witnessed gasping during sleep [de-identified] : at 6 cm of water

## 2020-08-31 NOTE — REVIEW OF SYSTEMS
[Hypertension] : ~T hypertension [Diabetes] : diabetes mellitus [Fever] : no fever [Chills] : no chills [Dry Eyes] : no dryness of the eyes [Eye Irritation] : no ~T irritation of the eyes [Nasal Congestion] : no nasal congestion [Epistaxis] : no nosebleeds [Postnasal Drip] : no postnasal drip [Cough] : no cough [Sinus Problems] : no sinus problems [Sputum] : not coughing up ~M sputum [Chest Tightness] : no chest tightness [Dyspnea] : no dyspnea [Pleuritic Pain] : no pleuritic pain [Wheezing] : no wheezing [Chest Discomfort] : no chest discomfort [Dysrhythmia] : no dysrhythmia [Murmurs] : no murmurs were heard [Edema] : ~T edema was not present [Palpitations] : no palpitations [Hay Fever] : no hay fever [Itchy Eyes] : no itching of ~T the eyes [Reflux] : no reflux [Nausea] : no nausea [Vomiting] : no vomiting [Constipation] : no constipation [Abdominal Pain] : no abdominal pain [Diarrhea] : no diarrhea [Dysuria] : no dysuria [Trauma] : no ~T physical trauma [Anemia] : no anemia [Fracture] : no fracture [Headache] : no headache [Dizziness] : no dizziness [Numbness] : no numbness [Syncope] : no fainting [Seizures] : no seizures [Paralysis] : no paralysis was seen [Depression] : no depression [Thyroid Problem] : no thyroid problem [Anxiety] : no anxiety

## 2020-08-31 NOTE — PROCEDURE
[FreeTextEntry1] : PFTs performed previously were normal.\par Spirometry subsequently was normal.\par PFTs 9/18/18 - normal\par PFTs 9/17/19 - normal and without significant change\par PFTs 8/20/20 - normal and without significant change

## 2020-08-31 NOTE — DISCUSSION/SUMMARY
[FreeTextEntry1] : \par #1. CPAP at 6 cm of water to treat moderate to severe WALTER; encouraged compliance\par #2. PFTs performed previously and subsequent spirometry were normal \par #3. Diet and exercise for weight loss\par #4. Repeat chest CT to f/u sarcoidosis reveals improvement from prior; pt is on prednisone for hypercalcemia likely from sarcoidosis; repeat CT in 12/2020 for one year f/u\par #5. F/u in 4-6 months to reassess response to CPAP; encouraged compliance\par #6. Replace equipment as needed; ordered 6/26/19 with [employer] was given to pt to improve compliance\par #7. SOBOE is likely related to weight or deconditioning given normal PFTs\par #8. The patient does not appear to require BD therapy at this time\par #9. Consider dental evaluation for an oral appliance as alternative therapy for treatment of CPAP if clinically indicated\par #10. Consider following ACE and Ca levels per rheumatology; she is on prednisone for hypercalcemia thought to be due to her sarcoidosis per nephrology\par #11. Rheumatology and renal f/u\par #12. Reviewed risks of exposure and symptoms of Covid-19 virus, including how the virus is spread.\par \par Discussed the following risk-reducing strategies:\par -Wash hands with soap and water (proper technique reviewed) \par -Use alcohol based  when hand-washing is not an option\par -Maintain a social distance of at least 6 feet whenever possible\par -Avoid contact, especially hand shaking\par -Avoid touching eyes, nose, and mouth\par -Cover face/mouth when coughing or sneezing\par -Avoid close contact with sick people\par -Seek medical help if you develop a fever and/or respiratory symptoms (e.g. cough, chest pain, SOB)\par \par Patient's questions were answered and patient appears to understand these recommendations

## 2020-08-31 NOTE — PHYSICAL EXAM
[General Appearance - Well Developed] : well developed [Normal Appearance] : normal appearance [General Appearance - In No Acute Distress] : no acute distress [Normal Conjunctiva] : the conjunctiva exhibited no abnormalities [Neck Appearance] : the appearance of the neck was normal [Heart Rate And Rhythm] : heart rate and rhythm were normal [Heart Sounds] : normal S1 and S2 [Murmurs] : no murmurs present [Edema] : no peripheral edema present [] : no respiratory distress [Respiration, Rhythm And Depth] : normal respiratory rhythm and effort [Exaggerated Use Of Accessory Muscles For Inspiration] : no accessory muscle use [Auscultation Breath Sounds / Voice Sounds] : lungs were clear to auscultation bilaterally [Abdomen Soft] : soft [Abdomen Tenderness] : non-tender [Nail Clubbing] : no clubbing of the fingernails [Cyanosis, Localized] : no localized cyanosis [No Focal Deficits] : no focal deficits [Oriented To Time, Place, And Person] : oriented to person, place, and time [FreeTextEntry1] : Patient in wheelchair

## 2020-08-31 NOTE — REASON FOR VISIT
[Follow-Up] : a follow-up visit [Sarcoidosis] : sarcoidosis [Shortness of Breath] : shortness of Breath [Sleep Apnea] : sleep apnea [[employer] ] : provided by [employer]   [FreeTextEntry1] : [id number] [[id number]] : Citizen of the [place] [FreeTextEntry2] : Jaelyn

## 2020-09-01 PROCEDURE — G9005: CPT

## 2020-09-01 PROCEDURE — G9001: CPT

## 2020-09-09 ENCOUNTER — APPOINTMENT (OUTPATIENT)
Dept: NEPHROLOGY | Facility: CLINIC | Age: 60
End: 2020-09-09
Payer: MEDICAID

## 2020-09-09 VITALS
TEMPERATURE: 98.6 F | BODY MASS INDEX: 33.86 KG/M2 | HEART RATE: 76 BPM | HEIGHT: 62 IN | SYSTOLIC BLOOD PRESSURE: 126 MMHG | WEIGHT: 184 LBS | DIASTOLIC BLOOD PRESSURE: 82 MMHG

## 2020-09-09 DIAGNOSIS — Z82.61 FAMILY HISTORY OF ARTHRITIS: ICD-10-CM

## 2020-09-09 PROCEDURE — 36415 COLL VENOUS BLD VENIPUNCTURE: CPT

## 2020-09-09 PROCEDURE — 99215 OFFICE O/P EST HI 40 MIN: CPT | Mod: 25

## 2020-09-09 RX ORDER — LANCETS 33 GAUGE
EACH MISCELLANEOUS
Qty: 30 | Refills: 0 | Status: ACTIVE | COMMUNITY
Start: 2020-08-06

## 2020-09-09 RX ORDER — PROBENECID 500 MG/1
500 TABLET ORAL
Qty: 90 | Refills: 2 | Status: DISCONTINUED | COMMUNITY
Start: 2018-10-26 | End: 2020-09-09

## 2020-09-09 NOTE — PHYSICAL EXAM
[General Appearance - In No Acute Distress] : in no acute distress [General Appearance - Alert] : alert [Sclera] : the sclera and conjunctiva were normal [Outer Ear] : the ears and nose were normal in appearance [Strabismus] : no strabismus was seen [Neck Appearance] : the appearance of the neck was normal [Hearing Threshold Finger Rub Not [place]] : hearing was normal [Respiration, Rhythm And Depth] : normal respiratory rhythm and effort [] : no respiratory distress [Heart Sounds] : normal S1 and S2 [Heart Rate And Rhythm] : heart rate was normal and rhythm regular [Auscultation Breath Sounds / Voice Sounds] : lungs were clear to auscultation bilaterally [Heart Sounds Gallop] : no gallops [Abdomen Soft] : soft [Abdomen Tenderness] : non-tender [Edema] : there was no peripheral edema [Abnormal Walk] : normal gait [No CVA Tenderness] : no ~M costovertebral angle tenderness [No Focal Deficits] : no focal deficits [Oriented To Time, Place, And Person] : oriented to person, place, and time

## 2020-09-11 LAB
24R-OH-CALCIDIOL SERPL-MCNC: 25.3 PG/ML
ACE BLD-CCNC: 6 U/L
ALBUMIN MFR SERPL ELPH: 57 %
ALBUMIN SERPL ELPH-MCNC: 4.6 G/DL
ALBUMIN SERPL-MCNC: 4 G/DL
ALBUMIN/GLOB SERPL: 1.3 RATIO
ALPHA1 GLOB MFR SERPL ELPH: 4.5 %
ALPHA1 GLOB SERPL ELPH-MCNC: 0.3 G/DL
ALPHA2 GLOB MFR SERPL ELPH: 11.5 %
ALPHA2 GLOB SERPL ELPH-MCNC: 0.8 G/DL
ANION GAP SERPL CALC-SCNC: 16 MMOL/L
B-GLOBULIN MFR SERPL ELPH: 14.2 %
B-GLOBULIN SERPL ELPH-MCNC: 1 G/DL
BASOPHILS # BLD AUTO: 0.02 K/UL
BASOPHILS NFR BLD AUTO: 0.4 %
BUN SERPL-MCNC: 35 MG/DL
CALCIUM SERPL-MCNC: 10.6 MG/DL
CALCIUM SERPL-MCNC: 10.6 MG/DL
CHLORIDE SERPL-SCNC: 104 MMOL/L
CO2 SERPL-SCNC: 23 MMOL/L
CREAT SERPL-MCNC: 1.29 MG/DL
CREAT SPEC-SCNC: 113 MG/DL
CREAT/PROT UR: 0.1 RATIO
DEPRECATED KAPPA LC FREE/LAMBDA SER: 1.75 RATIO
EOSINOPHIL # BLD AUTO: 0.13 K/UL
EOSINOPHIL NFR BLD AUTO: 2.6 %
ESTIMATED AVERAGE GLUCOSE: 117 MG/DL
GAMMA GLOB FLD ELPH-MCNC: 0.9 G/DL
GAMMA GLOB MFR SERPL ELPH: 12.8 %
GLUCOSE SERPL-MCNC: 77 MG/DL
HBA1C MFR BLD HPLC: 5.7 %
HCT VFR BLD CALC: 37.2 %
HGB BLD-MCNC: 10.7 G/DL
IMM GRANULOCYTES NFR BLD AUTO: 0.4 %
INTERPRETATION SERPL IEP-IMP: NORMAL
KAPPA LC CSF-MCNC: 1.81 MG/DL
KAPPA LC SERPL-MCNC: 3.17 MG/DL
LYMPHOCYTES # BLD AUTO: 0.95 K/UL
LYMPHOCYTES NFR BLD AUTO: 19.3 %
M PROTEIN SPEC IFE-MCNC: NORMAL
MAN DIFF?: NORMAL
MCHC RBC-ENTMCNC: 26.2 PG
MCHC RBC-ENTMCNC: 28.8 GM/DL
MCV RBC AUTO: 91.2 FL
MONOCYTES # BLD AUTO: 0.38 K/UL
MONOCYTES NFR BLD AUTO: 7.7 %
NEUTROPHILS # BLD AUTO: 3.41 K/UL
NEUTROPHILS NFR BLD AUTO: 69.6 %
PARATHYROID HORMONE INTACT: 18 PG/ML
PHOSPHATE SERPL-MCNC: 2.9 MG/DL
PLATELET # BLD AUTO: 263 K/UL
POTASSIUM SERPL-SCNC: 4.3 MMOL/L
PROT SERPL-MCNC: 7 G/DL
PROT SERPL-MCNC: 7 G/DL
PROT UR-MCNC: 10 MG/DL
RBC # BLD: 4.08 M/UL
RBC # FLD: 15.4 %
SODIUM SERPL-SCNC: 143 MMOL/L
WBC # FLD AUTO: 4.91 K/UL

## 2020-09-11 NOTE — REVIEW OF SYSTEMS
[Negative] : Respiratory [Fever] : no fever [Chills] : no chills [Eye Pain] : no eye pain [Feeling Tired] : not feeling tired [Earache] : no earache [Loss Of Hearing] : no hearing loss [Red Eyes] : eyes not red [Heart Rate Is Fast] : the heart rate was not fast [Lower Ext Edema] : no lower extremity edema [Heart Rate Is Slow] : the heart rate was not slow [Cough] : no cough [Wheezing] : no wheezing [Shortness Of Breath] : no shortness of breath [Constipation] : no constipation [Abdominal Pain] : no abdominal pain [SOB on Exertion] : no shortness of breath during exertion [Diarrhea] : no diarrhea [Dysuria] : no dysuria [Arthralgias] : no arthralgias [Joint Pain] : no joint pain [Confused] : no confusion [Convulsions] : no convulsions [Suicidal] : not suicidal [Dizziness] : no dizziness [Sleep Disturbances] : no sleep disturbances [Anxiety] : no anxiety [Muscle Weakness] : no muscle weakness [Easy Bruising] : no tendency for easy bruising [Easy Bleeding] : no tendency for easy bleeding [de-identified] : psoriasis; LE lesions

## 2020-09-11 NOTE — HISTORY OF PRESENT ILLNESS
[FreeTextEntry1] : Patient is a 60 year old woman with history of DM2, HTN, gout, sarcoidosis with pulmonary nodules,  psoriasis, history of L sided ?pheochromocytoma s/p removal 30 years ago admitted to [facility] for hypercalcemia and MALIKA. Pt was tapered off steroids in the past and was also on high dose vitamin D.\par Hypercalcemia was attributed to be likely from flare up of sarcoidosis; 1-25(OH)2 vitamin D ~131)\par s/p IV hydration and 2 doses of calcitonin, started on prednisone 40 mg daily\par Metformin,  ACE-I and vitamin D were also stopped. She has now been restarted on ACE-I, currently on prednisone 10 mg daily.\par \par \par Today, \par She presents for follow up\par Feels well\par She is on prednisone 10 mg daily; last labs with SCa++ persistently elevated at 11.4\par

## 2020-09-11 NOTE — ASSESSMENT
[FreeTextEntry1] : 1) CKD III in the setting of HTN, DM, gout, NSAID use with superimposed MALIKA from hypercalcemia\par Scr now stable at 1.2\par \par 2) Hypercalcemia\par 1-25(OH)2 vitamin D mediated - from Sarcoidosis \par Pt was also on  on high dose vitamin D\par On prednisone taper; currently prednisone 10 mg daily\par Sca++ persistently elevated at 10.6; also she has had multiple rebounds of sarcoid with SHORT tapers; will need to prolong therapy at this dose\par Will start Furosemide 20 mg daily for hypercalcuric effect- will decrease Enalapril to 2.5 mg daily to avoid hypotension\par \par 3) HTN\par BP stable; adding lasix\par decrease enalapril as above\par \par 4) DM\par off Metformin due to MALIKA\par Currently on Glipizide only\par HbA1c ~ 5.7; keep off Metformin\par \par RTC in 2 weeks\par \par \par \par

## 2020-10-07 ENCOUNTER — APPOINTMENT (OUTPATIENT)
Dept: NEPHROLOGY | Facility: CLINIC | Age: 60
End: 2020-10-07
Payer: MEDICAID

## 2020-10-07 VITALS
HEIGHT: 62 IN | HEART RATE: 74 BPM | OXYGEN SATURATION: 98 % | DIASTOLIC BLOOD PRESSURE: 83 MMHG | BODY MASS INDEX: 33.31 KG/M2 | TEMPERATURE: 97.7 F | SYSTOLIC BLOOD PRESSURE: 156 MMHG | WEIGHT: 181 LBS

## 2020-10-07 PROCEDURE — 36415 COLL VENOUS BLD VENIPUNCTURE: CPT

## 2020-10-07 PROCEDURE — 99214 OFFICE O/P EST MOD 30 MIN: CPT | Mod: 25

## 2020-10-07 NOTE — PHYSICAL EXAM
[General Appearance - Alert] : alert [General Appearance - In No Acute Distress] : in no acute distress [Sclera] : the sclera and conjunctiva were normal [Strabismus] : no strabismus was seen [Outer Ear] : the ears and nose were normal in appearance [Hearing Threshold Finger Rub Not [place]] : hearing was normal [Neck Appearance] : the appearance of the neck was normal [] : no respiratory distress [Respiration, Rhythm And Depth] : normal respiratory rhythm and effort [Auscultation Breath Sounds / Voice Sounds] : lungs were clear to auscultation bilaterally [Heart Rate And Rhythm] : heart rate was normal and rhythm regular [Heart Sounds] : normal S1 and S2 [Heart Sounds Gallop] : no gallops [Edema] : there was no peripheral edema [Abdomen Soft] : soft [Abdomen Tenderness] : non-tender [No CVA Tenderness] : no ~M costovertebral angle tenderness [Abnormal Walk] : normal gait [No Focal Deficits] : no focal deficits [Oriented To Time, Place, And Person] : oriented to person, place, and time

## 2020-10-09 LAB
ANION GAP SERPL CALC-SCNC: 13 MMOL/L
BUN SERPL-MCNC: 40 MG/DL
CALCIUM SERPL-MCNC: 10.5 MG/DL
CHLORIDE SERPL-SCNC: 104 MMOL/L
CO2 SERPL-SCNC: 26 MMOL/L
CREAT SERPL-MCNC: 1.2 MG/DL
GLUCOSE SERPL-MCNC: 88 MG/DL
POTASSIUM SERPL-SCNC: 5.1 MMOL/L
SODIUM SERPL-SCNC: 143 MMOL/L

## 2020-10-09 NOTE — ASSESSMENT
[FreeTextEntry1] : 1) CKD III in the setting of HTN, DM, gout, NSAID use with superimposed MALIKA from hypercalcemia\par Scr now stable at 1.2\par \par 2) Hypercalcemia\par 1-25(OH)2 vitamin D mediated - from Sarcoidosis \par Pt was also on  on high dose vitamin D which has been stopped. She is currently on prednisone taper -10 mg daily\par Sca++ persistently elevated at 10.6; also she has had multiple rebounds of sarcoid with SHORT tapers; will need to prolong therapy at this dose. Started on Lasix 20 mg daily for hypercalciuric effect.\par Will change to 20 mg every other day as pt might be too sensitive to diuresis. \par Repeat BMP today\par \par 3) HTN\par BP stable; change lasix to every other day as above\par Continue Enalapril\par \par \par 4) DM\par off Metformin due to MALIKA\par Currently on Glipizide only- reports hypoglycemic events on Glipizide\par HbA1c ~ 5.7; will restart Metformin if SCr stable\par \par \par RTC in 2 months\par \par Addendum;\par BMP reviewed\par Scr stable at 1.2; ok to resume Metformin\par SCa++ at higher limit of normal (10.5)- continue prednisone 10 mg daily, lasix 20 mg qod\par will try to taper steroids on next visit if values stable\par Called pt and left detailed voicemail. \par \par \par

## 2020-10-09 NOTE — HISTORY OF PRESENT ILLNESS
[FreeTextEntry1] : Patient is a 60 year old woman with history of DM2, HTN, gout, sarcoidosis with pulmonary nodules,  psoriasis, history of L sided ?pheochromocytoma s/p removal 30 years ago admitted to [facility] for hypercalcemia and MALIKA. Pt was tapered off steroids in the past and was also on high dose vitamin D.\par Hypercalcemia was attributed to be likely from flare up of sarcoidosis; 1-25(OH)2 vitamin D ~131)\par s/p IV hydration and 2 doses of calcitonin, started on prednisone 40 mg daily\par Metformin,  ACE-I and vitamin D were also stopped. She has now been restarted on ACE-I, on steroid taper- currently on prednisone 10 mg daily.\par \par \par Today, \par She presents for follow up\par Feels well- reports feeling lightheaded at times; also reports skin is dry since she was started on lasix.\par She is on prednisone 10 mg daily; last labs with SCa++ persistently elevated at 10.6.\par c/o hypoglycemia with glipizide, asking if she can resume metformin.\par \par

## 2020-10-09 NOTE — REVIEW OF SYSTEMS
[Negative] : Respiratory [Fever] : no fever [Chills] : no chills [Feeling Tired] : not feeling tired [Eye Pain] : no eye pain [Red Eyes] : eyes not red [Earache] : no earache [Loss Of Hearing] : no hearing loss [Heart Rate Is Slow] : the heart rate was not slow [Heart Rate Is Fast] : the heart rate was not fast [Lower Ext Edema] : no lower extremity edema [Shortness Of Breath] : no shortness of breath [Wheezing] : no wheezing [Cough] : no cough [SOB on Exertion] : no shortness of breath during exertion [Abdominal Pain] : no abdominal pain [Constipation] : no constipation [Diarrhea] : no diarrhea [Dysuria] : no dysuria [Arthralgias] : no arthralgias [Joint Pain] : no joint pain [Confused] : no confusion [Convulsions] : no convulsions [Dizziness] : no dizziness [Suicidal] : not suicidal [Sleep Disturbances] : no sleep disturbances [Anxiety] : no anxiety [Muscle Weakness] : no muscle weakness [Easy Bleeding] : no tendency for easy bleeding [Easy Bruising] : no tendency for easy bruising [de-identified] : psoriasis; LE lesions

## 2020-11-05 ENCOUNTER — RX RENEWAL (OUTPATIENT)
Age: 60
End: 2020-11-05

## 2020-12-02 ENCOUNTER — APPOINTMENT (OUTPATIENT)
Dept: NEPHROLOGY | Facility: CLINIC | Age: 60
End: 2020-12-02
Payer: MEDICAID

## 2020-12-02 VITALS
HEART RATE: 86 BPM | BODY MASS INDEX: 33.68 KG/M2 | DIASTOLIC BLOOD PRESSURE: 78 MMHG | SYSTOLIC BLOOD PRESSURE: 126 MMHG | WEIGHT: 183 LBS | TEMPERATURE: 97.2 F | HEIGHT: 62 IN

## 2020-12-02 PROCEDURE — 99072 ADDL SUPL MATRL&STAF TM PHE: CPT

## 2020-12-02 PROCEDURE — 99215 OFFICE O/P EST HI 40 MIN: CPT

## 2020-12-02 NOTE — PHYSICAL EXAM
[General Appearance - Alert] : alert [General Appearance - In No Acute Distress] : in no acute distress [Sclera] : the sclera and conjunctiva were normal [Strabismus] : no strabismus was seen [Outer Ear] : the ears and nose were normal in appearance [Hearing Threshold Finger Rub Not [place]] : hearing was normal [Neck Appearance] : the appearance of the neck was normal [] : no respiratory distress [Respiration, Rhythm And Depth] : normal respiratory rhythm and effort [Auscultation Breath Sounds / Voice Sounds] : lungs were clear to auscultation bilaterally [Heart Rate And Rhythm] : heart rate was normal and rhythm regular [Heart Sounds] : normal S1 and S2 [Heart Sounds Gallop] : no gallops [Edema] : there was no peripheral edema [Abdomen Soft] : soft [Abdomen Tenderness] : non-tender [No CVA Tenderness] : no ~M costovertebral angle tenderness [Abnormal Walk] : normal gait [No Focal Deficits] : no focal deficits [Oriented To Time, Place, And Person] : oriented to person, place, and time

## 2020-12-06 ENCOUNTER — EMERGENCY (EMERGENCY)
Facility: HOSPITAL | Age: 60
LOS: 1 days | Discharge: HOME | End: 2020-12-06
Attending: EMERGENCY MEDICINE
Payer: COMMERCIAL

## 2020-12-06 VITALS
HEART RATE: 117 BPM | TEMPERATURE: 98 F | HEIGHT: 62 IN | OXYGEN SATURATION: 99 % | DIASTOLIC BLOOD PRESSURE: 84 MMHG | WEIGHT: 182.98 LBS | RESPIRATION RATE: 16 BRPM | SYSTOLIC BLOOD PRESSURE: 133 MMHG

## 2020-12-06 VITALS
OXYGEN SATURATION: 97 % | SYSTOLIC BLOOD PRESSURE: 121 MMHG | HEART RATE: 101 BPM | DIASTOLIC BLOOD PRESSURE: 80 MMHG | RESPIRATION RATE: 18 BRPM

## 2020-12-06 PROCEDURE — 99283 EMERGENCY DEPT VISIT LOW MDM: CPT

## 2020-12-06 PROCEDURE — 73610 X-RAY EXAM OF ANKLE: CPT

## 2020-12-06 PROCEDURE — 73610 X-RAY EXAM OF ANKLE: CPT | Mod: 26,RT

## 2020-12-06 RX ORDER — ACETAMINOPHEN 500 MG
650 TABLET ORAL ONCE
Refills: 0 | Status: COMPLETED | OUTPATIENT
Start: 2020-12-06 | End: 2020-12-06

## 2020-12-06 RX ADMIN — Medication 650 MILLIGRAM(S): at 23:14

## 2020-12-06 NOTE — ED PROVIDER NOTE - CARE PROVIDER_API CALL
Bryce Peña)  Orthopaedic Surgery  [address] [place] [address]  Phone: [phone]  Fax: [phone]  Follow Up Time:

## 2020-12-06 NOTE — ED PROVIDER NOTE - OBJECTIVE STATEMENT
61 yo female PMHx NIDDM2, HTN, sarcoidosis presents to ED c/o right ankle pain x2 days. Initially felt pain suddenly after sitting down. Pain minor yesterday, worsened today. Self medicated with acetaminophen 8 hours PTA with minimal relief, worsened with movement. Previous fracture 3 years ago. Patient presents with crutches from previous injury. No further complaints at this time.   Denies recent injury. 59 yo female PMHx NIDDM2, HTN, sarcoidosis presents to ED c/o right ankle pain x2 days. Initially felt pain suddenly after sitting down. Pain minor yesterday, worsened today. Self medicated with acetaminophen 8 hours PTA with minimal relief, worsened with movement. Previous fracture 3 years ago, did not require surgery. Patient presents with crutches from previous injury. No further complaints at this time.   Denies recent injury.

## 2020-12-06 NOTE — ED PROVIDER NOTE - ATTENDING CONTRIBUTION TO CARE
The patient seen and examined    Ankle Pain    I, Jeffrey Aparicio, performed the initial face to face bedside interview with this patient regarding history of present illness, review of symptoms and relevant past medical, social and family history.  I completed an independent physical examination.  I was the initial provider who evaluated this patient. I have signed out the follow up of any pending tests (i.e. labs, radiological studies) to the ACP.  I have communicated the patient’s plan of care and disposition with the ACP.

## 2020-12-06 NOTE — ED PROVIDER NOTE - PATIENT PORTAL LINK FT
You can access the FollowMyHealth Patient Portal offered by [facility] by registering at the following website: [email or web address]. By joining [employer]’s FollowMyHealth portal, you will also be able to view your health information using other applications (apps) compatible with our system.

## 2020-12-06 NOTE — ED PROVIDER NOTE - NSFOLLOWUPINSTRUCTIONS_ED_ALL_ED_FT
- Acetaminophen for pain.  - Rest.   - Ice.   - Elevate extremity.   - Ambulate using crutches.  - Please call tomorrow to schedule follow up appointment with orthopedist.  - Please call to schedule follow up appointment with your primary care physician within 24-48 hours.  - Please seek immediate medical attention for any new/worsening, signs/symptoms, or concerns.    Feel better!      Ankle Pain      The ankle joint holds your body weight and allows you to move around. Ankle pain can occur on either side or the back of one ankle or both ankles. Ankle pain may be sharp and burning or dull and aching. There may be tenderness, stiffness, redness, or warmth around the ankle. Many things can cause ankle pain, including an injury to the area and overuse of the ankle.      Follow these instructions at home:    Activity     •Rest your ankle as told by your health care provider. Avoid any activities that cause ankle pain.      • Do not use the injured limb to support your body weight until your health care provider says that you can. Use crutches as told by your health care provider.      •Do exercises as told by your health care provider.      •Ask your health care provider when it is safe to drive if you have a brace on your ankle.      If you have a brace:     •Wear the brace as told by your health care provider. Remove it only as told by your health care provider.      •Loosen the brace if your toes tingle, become numb, or turn cold and blue.      •Keep the brace clean.    •If the brace is not waterproof:  •Do not let it get wet.      •Cover it with a watertight covering when you take a bath or shower.          If you were given an elastic bandage:      •Remove it when you take a bath or a shower.      •Try not to move your ankle very much, but wiggle your toes from time to time. This helps to prevent swelling.      •Adjust the bandage to make it more comfortable if it feels too tight.      •Loosen the bandage if you have numbness or tingling in your foot or if your foot turns cold and blue.        Managing pain, stiffness, and swelling    •If directed, put ice on the painful area.  •If you have a removable brace or elastic bandage, remove it as told by your health care provider.      •Put ice in a plastic bag.      •Place a towel between your skin and the bag.      •Leave the ice on for 20 minutes, 2–3 times a day.        •Move your toes often to avoid stiffness and to lessen swelling.      •Raise (elevate) your ankle above the level of your heart while you are sitting or lying down.      General instructions   •Record information about your pain. Writing down the following may be helpful for you and your health care provider:  •How often you have ankle pain.      •Where the pain is located.      •What the pain feels like.        •If treatment involves wearing a prescribed shoe or insole, make sure you wear it correctly and for as long as told by your health care provider.      •Take over-the-counter and prescription medicines only as told by your health care provider.      •Keep all follow-up visits as told by your health care provider. This is important.        Contact a health care provider if:    •Your pain gets worse.      •Your pain is not relieved with medicines.      •You have a fever or chills.      •You are having more trouble with walking.      •You have new symptoms.        Get help right away if:  •Your foot, leg, toes, or ankle:  •Tingles or becomes numb.      •Becomes swollen.      •Turns pale or blue.          Summary    •Ankle pain can occur on either side or the back of one ankle or both ankles.      •Ankle pain may be sharp and burning or dull and aching.      •Rest your ankle as told by your health care provider. If told, apply ice to the area.      •Take over-the-counter and prescription medicines only as told by your health care provider.      This information is not intended to replace advice given to you by your health care provider. Make sure you discuss any questions you have with your health care provider.

## 2020-12-06 NOTE — ED PROVIDER NOTE - CLINICAL SUMMARY MEDICAL DECISION MAKING FREE TEXT BOX
61 yo female PMHx NIDDM2, HTN, sarcoidosis presents to ED c/o right ankle pain x2 days. Presents bearing weight with crutches for assistance. No deformity, FROM. Plan: XR.

## 2020-12-06 NOTE — ED PROVIDER NOTE - PHYSICAL EXAMINATION
General: In NAD, non-toxic appearing; well nourished/developed.  Skin: Warm, dry, color normal for race.   Cardio: No lifts, heaves, visible pulsations. No thrills. Rate and rhythm regular, S1 & S2 clear. No audible murmur, gallop, or rub.  PV: +Psoriasis and chronic venous changes to BLE. No calf pain/swelling/tenderness. Pulses: b/l 2+ DP, PT. Capillary refill <2 seconds.  Resp: Normal AP to lateral diameter, symmetrical excursion b/l. Breath sounds vesicular, symmetrical and without rales, rhonchi or wheezing b/l.  MSK/Neuro: A&Ox3. No deformity, swelling, or ecchymosis. No fibula head/proximal fibula tenderness. +Tenderness to posterior aspect of lateral malleolus, no medial malleolus tenderness. No tenderness to ATFL. FROM. Global strength 5/5. Bearing weight with crutches for assistance.   Psych: Normal mood and affect. No apparent risk to self or others.

## 2020-12-13 LAB
ANION GAP SERPL CALC-SCNC: 16 MMOL/L
BUN SERPL-MCNC: 41 MG/DL
CALCIUM SERPL-MCNC: 11 MG/DL
CHLORIDE SERPL-SCNC: 102 MMOL/L
CO2 SERPL-SCNC: 24 MMOL/L
CREAT SERPL-MCNC: 1.3 MG/DL
GLUCOSE SERPL-MCNC: 79 MG/DL
POTASSIUM SERPL-SCNC: 4.9 MMOL/L
SODIUM SERPL-SCNC: 142 MMOL/L

## 2020-12-13 NOTE — ASSESSMENT
[FreeTextEntry1] : 1) CKD III in the setting of HTN, DM, Gout, NSAID use with superimposed MALIKA from hypercalcemia\par Scr now stable at 1.2\par \par 2) Hypercalcemia\par 1-25(OH)2 vitamin D mediated - from Sarcoidosis \par Pt was also on  on high dose vitamin D which has been stopped. She is currently on prednisone taper -10 mg daily\par Sca++ persistently elevated at 10.6; also she has had multiple rebounds of sarcoid with SHORT tapers;  prolonging therapy at this dose.\par Continue Lasix 20 mg qod  for hypercalciuric effect.\par Last SCa++ 10.5; Repeat BMP today\par if SCa++ stable, will decrease prednisone to 5 mg daily\par \par 3) HTN\par BP stable; continue lasix and Enalapril\par \par \par 4) DM\par Resumed Metformin\par Also on Glipizide \par \par \par RTC in 2 months\par \par \par Addendum:\par Labs with Sca++ worse at 11.0\par increase prednisone to 20 mg daily\par RTO in 1 month\par left detailed vmail\par \par \par \par

## 2020-12-13 NOTE — HISTORY OF PRESENT ILLNESS
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 Pt was tapered off steroids in the past and was also on high dose vitamin D.\par Hypercalcemia was attributed to be likely from flare up of sarcoidosis; 1-25(OH)2 vitamin D ~131)\par s/p IV hydration and 2 doses of calcitonin, started on prednisone 40 mg daily\par Metformin,  ACE-I and vitamin D were also stopped. She has now been restarted on ACE-I, on steroid taper- currently on prednisone 10 mg daily.\par \par \par Today, \par She presents for follow up\par Feels well; no acute complaint\par Tolerating lasix 20 mg qod\par \par \par

## 2020-12-13 NOTE — REVIEW OF SYSTEMS
[Negative] : Respiratory [Fever] : no fever [Chills] : no chills [Feeling Tired] : not feeling tired [Eye Pain] : no eye pain [Red Eyes] : eyes not red [Earache] : no earache [Loss Of Hearing] : no hearing loss [Heart Rate Is Slow] : the heart rate was not slow [Heart Rate Is Fast] : the heart rate was not fast [Lower Ext Edema] : no lower extremity edema [Shortness Of Breath] : no shortness of breath [Wheezing] : no wheezing [Cough] : no cough [SOB on Exertion] : no shortness of breath during exertion [Abdominal Pain] : no abdominal pain [Constipation] : no constipation [Diarrhea] : no diarrhea [Dysuria] : no dysuria [Arthralgias] : no arthralgias [Joint Pain] : no joint pain [Confused] : no confusion [Convulsions] : no convulsions [Dizziness] : no dizziness [Suicidal] : not suicidal [Sleep Disturbances] : no sleep disturbances [Anxiety] : no anxiety [Muscle Weakness] : no muscle weakness [Easy Bleeding] : no tendency for easy bleeding [Easy Bruising] : no tendency for easy bruising [de-identified] : psoriasis; LE lesions

## 2020-12-18 ENCOUNTER — APPOINTMENT (OUTPATIENT)
Dept: ORTHOPEDIC SURGERY | Facility: CLINIC | Age: 60
End: 2020-12-18
Payer: MEDICAID

## 2020-12-18 VITALS
SYSTOLIC BLOOD PRESSURE: 115 MMHG | DIASTOLIC BLOOD PRESSURE: 61 MMHG | BODY MASS INDEX: 33.68 KG/M2 | WEIGHT: 183 LBS | HEIGHT: 62 IN | HEART RATE: 81 BPM

## 2020-12-18 VITALS — TEMPERATURE: 95.7 F

## 2020-12-18 PROCEDURE — 99214 OFFICE O/P EST MOD 30 MIN: CPT

## 2020-12-18 PROCEDURE — 99072 ADDL SUPL MATRL&STAF TM PHE: CPT

## 2021-01-30 ENCOUNTER — OUTPATIENT (OUTPATIENT)
Dept: OUTPATIENT SERVICES | Facility: HOSPITAL | Age: 61
LOS: 1 days | End: 2021-01-30
Payer: MEDICAID

## 2021-01-30 ENCOUNTER — APPOINTMENT (OUTPATIENT)
Dept: MAMMOGRAPHY | Facility: CLINIC | Age: 61
End: 2021-01-30
Payer: MEDICAID

## 2021-01-30 DIAGNOSIS — Z00.00 ENCOUNTER FOR GENERAL ADULT MEDICAL EXAMINATION WITHOUT ABNORMAL FINDINGS: ICD-10-CM

## 2021-01-30 PROCEDURE — 77063 BREAST TOMOSYNTHESIS BI: CPT

## 2021-01-30 PROCEDURE — 77067 SCR MAMMO BI INCL CAD: CPT

## 2021-01-30 PROCEDURE — 77067 SCR MAMMO BI INCL CAD: CPT | Mod: 26

## 2021-01-30 PROCEDURE — 77063 BREAST TOMOSYNTHESIS BI: CPT | Mod: 26

## 2021-02-03 ENCOUNTER — APPOINTMENT (OUTPATIENT)
Dept: NEPHROLOGY | Facility: CLINIC | Age: 61
End: 2021-02-03
Payer: MEDICAID

## 2021-02-03 VITALS
WEIGHT: 183 LBS | HEIGHT: 62 IN | DIASTOLIC BLOOD PRESSURE: 84 MMHG | SYSTOLIC BLOOD PRESSURE: 142 MMHG | BODY MASS INDEX: 33.68 KG/M2 | TEMPERATURE: 98 F | HEART RATE: 94 BPM

## 2021-02-03 PROCEDURE — 99072 ADDL SUPL MATRL&STAF TM PHE: CPT

## 2021-02-03 PROCEDURE — 99214 OFFICE O/P EST MOD 30 MIN: CPT

## 2021-02-03 NOTE — PHYSICAL EXAM
[General Appearance - Alert] : alert [General Appearance - In No Acute Distress] : in no acute distress [Sclera] : the sclera and conjunctiva were normal [Strabismus] : no strabismus was seen [Hearing Threshold Finger Rub Not [place]] : hearing was normal [Outer Ear] : the ears and nose were normal in appearance [Neck Appearance] : the appearance of the neck was normal [] : no respiratory distress [Respiration, Rhythm And Depth] : normal respiratory rhythm and effort [Auscultation Breath Sounds / Voice Sounds] : lungs were clear to auscultation bilaterally [Heart Rate And Rhythm] : heart rate was normal and rhythm regular [Heart Sounds] : normal S1 and S2 [Heart Sounds Gallop] : no gallops [Edema] : there was no peripheral edema [Abdomen Soft] : soft [Abdomen Tenderness] : non-tender [No CVA Tenderness] : no ~M costovertebral angle tenderness [Abnormal Walk] : normal gait [No Focal Deficits] : no focal deficits [Oriented To Time, Place, And Person] : oriented to person, place, and time

## 2021-02-05 ENCOUNTER — APPOINTMENT (OUTPATIENT)
Dept: ORTHOPEDIC SURGERY | Facility: CLINIC | Age: 61
End: 2021-02-05
Payer: MEDICAID

## 2021-02-05 VITALS
TEMPERATURE: 97.9 F | DIASTOLIC BLOOD PRESSURE: 72 MMHG | SYSTOLIC BLOOD PRESSURE: 116 MMHG | BODY MASS INDEX: 33.68 KG/M2 | HEART RATE: 86 BPM | WEIGHT: 183 LBS | HEIGHT: 62 IN

## 2021-02-05 DIAGNOSIS — M25.571 PAIN IN RIGHT ANKLE AND JOINTS OF RIGHT FOOT: ICD-10-CM

## 2021-02-05 PROCEDURE — 99213 OFFICE O/P EST LOW 20 MIN: CPT

## 2021-02-05 PROCEDURE — 99072 ADDL SUPL MATRL&STAF TM PHE: CPT

## 2021-02-11 LAB
ALBUMIN SERPL ELPH-MCNC: 4.2 G/DL
ANION GAP SERPL CALC-SCNC: 12 MMOL/L
BUN SERPL-MCNC: 32 MG/DL
CALCIUM SERPL-MCNC: 10.3 MG/DL
CHLORIDE SERPL-SCNC: 103 MMOL/L
CO2 SERPL-SCNC: 27 MMOL/L
CREAT SERPL-MCNC: 1.13 MG/DL
GLUCOSE SERPL-MCNC: 93 MG/DL
PHOSPHATE SERPL-MCNC: 2.5 MG/DL
POTASSIUM SERPL-SCNC: 5 MMOL/L
SODIUM SERPL-SCNC: 142 MMOL/L

## 2021-02-11 NOTE — HISTORY OF PRESENT ILLNESS
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 Pt was tapered off steroids in the past and was also on high dose vitamin D.\par Hypercalcemia was attributed to be likely from flare up of sarcoidosis; 1-25(OH)2 vitamin D ~131)\par s/p IV hydration and 2 doses of calcitonin, started on prednisone 40 mg daily\par Metformin,  ACE-I and vitamin D were also stopped. She has now been restarted on ACE-I, on steroid taper- calcium worsened now back  on prednisone 20 mg daily.\par \par \par Today, \par She presents for follow up\par Feels well; no acute complaint\par Tolerating lasix 20 mg qod\par Sugars have been high\par \par \par

## 2021-02-11 NOTE — REVIEW OF SYSTEMS
[Negative] : Respiratory [Fever] : no fever [Chills] : no chills [Feeling Tired] : not feeling tired [Eye Pain] : no eye pain [Red Eyes] : eyes not red [Earache] : no earache [Heart Rate Is Slow] : the heart rate was not slow [Loss Of Hearing] : no hearing loss [Heart Rate Is Fast] : the heart rate was not fast [Lower Ext Edema] : no lower extremity edema [Wheezing] : no wheezing [Shortness Of Breath] : no shortness of breath [Cough] : no cough [SOB on Exertion] : no shortness of breath during exertion [Abdominal Pain] : no abdominal pain [Constipation] : no constipation [Diarrhea] : no diarrhea [Dysuria] : no dysuria [Arthralgias] : no arthralgias [Joint Pain] : no joint pain [Confused] : no confusion [Convulsions] : no convulsions [Dizziness] : no dizziness [Suicidal] : not suicidal [Sleep Disturbances] : no sleep disturbances [Anxiety] : no anxiety [Muscle Weakness] : no muscle weakness [Easy Bleeding] : no tendency for easy bleeding [Easy Bruising] : no tendency for easy bruising [de-identified] : psoriasis; LE lesions

## 2021-02-11 NOTE — ASSESSMENT
[FreeTextEntry1] : 1) CKD III in the setting of HTN, DM, Gout, NSAID use with superimposed MALIKA from hypercalcemia\par Scr now stable at 1.2\par \par 2) Hypercalcemia\par 1-25(OH)2 vitamin D mediated - from Sarcoidosis \par Pt was also on  on high dose vitamin D which has been stopped. She is currently on prednisone taper -20 mg daily\par Sca++ persistently elevated at 10.6; also she has had multiple rebounds of sarcoid with SHORT tapers;  prolonging therapy at this dose.\par Continue Lasix 20 mg qod  for hypercalciuric effect.\par Last SCa++ 11; Repeat BMP today\par if SCa++ stable, will decrease prednisone to 10 mg daily\par \par 3) HTN\par BP stable; continue lasix and Enalapril\par \par \par 4) DM\par Resumed Metformin\par Also on Glipizide \par \par \par \par Addendum: labs reviewed\par SCa++ 10.3, decrease prednisone to 10 mg daily\par d/w pt- instructed to return in one month\par \par \par \par

## 2021-03-18 ENCOUNTER — APPOINTMENT (OUTPATIENT)
Dept: PULMONOLOGY | Facility: CLINIC | Age: 61
End: 2021-03-18
Payer: MEDICAID

## 2021-03-18 VITALS
WEIGHT: 184 LBS | HEIGHT: 62 IN | RESPIRATION RATE: 16 BRPM | BODY MASS INDEX: 33.86 KG/M2 | TEMPERATURE: 97.2 F | HEART RATE: 104 BPM | SYSTOLIC BLOOD PRESSURE: 120 MMHG | OXYGEN SATURATION: 99 % | DIASTOLIC BLOOD PRESSURE: 90 MMHG

## 2021-03-18 PROCEDURE — 99214 OFFICE O/P EST MOD 30 MIN: CPT

## 2021-03-18 PROCEDURE — 99072 ADDL SUPL MATRL&STAF TM PHE: CPT

## 2021-03-18 NOTE — HISTORY OF PRESENT ILLNESS
[Excess Weight] : excess weight [Dyspnea] : dyspnea [Joint Pain] : joint pain [Low Calorie Diet] : low calorie diet [Fair Compliance] : fair compliance with treatment [Fair Tolerance] : fair tolerance of treatment [Diabetes] : diabetes [Hypertension] : hypertension [High] : high [Low Calorie] : low calorie [Well Balanced Diet] : well balanced meals [Infrequently] : exercises infrequently [Walking] : walking [Follow-Up - Routine Clinic] : a routine clinic follow-up of [Excessive Daytime Sleepiness] : excessive daytime sleepiness [Snoring] : snoring [Unrefreshing Sleep] : unrefreshing sleep [Sleepy When Sedentary] : sleepy when sedentary [Currently Experiencing] : The patient is currently experiencing symptoms. [None] : No associated symptoms are reported [CPAP] : CPAP [Poor Compliance] : poor compliance with treatment [Poor Tolerance] : poor tolerance of treatment [Poor Symptom Control] : poor symptom control [FreeTextEntry1] : The patient recently suffered an ankle injury and was on crutches and a wheelchair but now is walking without support.\par She is followed by hematology for anemia.\par She is on prednisone per rheumatology for hypercalcemia. [Witnessed Apnea During Sleep] : no witnessed apnea during sleep [Witnessed Gasping During Sleep] : no witnessed gasping during sleep [de-identified] : at 6 cm of water

## 2021-03-18 NOTE — REASON FOR VISIT
[Follow-Up] : a follow-up visit [Sarcoidosis] : sarcoidosis [Shortness of Breath] : shortness of Breath [Sleep Apnea] : sleep apnea [[employer] ] : provided by [employer]   [FreeTextEntry1] : [id number] [FreeTextEntry2] : Jaelyn [TWNoteComboBox1] : Nepalese

## 2021-03-18 NOTE — DISCUSSION/SUMMARY
[FreeTextEntry1] : \par #1. CPAP at 6 cm of water to treat moderate to severe WALTER; encouraged compliance in the past though pt is not compliant; Consider dental evaluation for an oral appliance as alternative therapy for treatment of WALTER\par #2. Lung function testing have been normal\par #3. Diet and exercise for weight loss\par #4. Repeat chest CT to f/u sarcoidosis as above; pt is on prednisone for hypercalcemia likely from sarcoidosis; repeat CT in 3 months for 6 month f/u given increase in nodules\par #5. F/u in 3 months with Chest CT to f/u nodules\par #6. Replace equipment as needed; ordered 6/26/19 with [employer] given to pt to improve compliance if decides to try CPAP again\par #7. SOBOE is likely related to weight or deconditioning given normal PFTs\par #8. The patient does not appear to require BD therapy at this time\par #9. Follow ACE and Ca levels per rheumatology; she is on prednisone for hypercalcemia thought to be due to her sarcoidosis per nephrology\par #10. Referred patient for evaluation by obesity medicine. \par #11. Rheumatology and renal f/u\par #12. Reviewed risks of exposure and symptoms of Covid-19 virus, including how the virus is spread and precautions to avoid kerrie virus.\par \par Patient's questions were answered and patient appears to understand these recommendations

## 2021-03-18 NOTE — CONSULT LETTER
[Dear  ___] : Dear  [unfilled], [Consult Letter:] : I had the pleasure of evaluating your patient, [unfilled]. [Please see my note below.] : Please see my note below. [Consult Closing:] : Thank you very much for allowing me to participate in the care of this patient.  If you have any questions, please do not hesitate to contact me. [Sincerely,] : Sincerely, [DrYasmani  ___] : Dr. GASPAR [DrYasmani ___] : Dr. GASPAR [FreeTextEntry3] : Leland Sims MD, FCCP, D. ABSM\par Pulmonary and Sleep Medicine\par [facility] [place]\par \par

## 2021-04-21 ENCOUNTER — APPOINTMENT (OUTPATIENT)
Dept: NEPHROLOGY | Facility: CLINIC | Age: 61
End: 2021-04-21
Payer: MEDICAID

## 2021-04-21 VITALS
HEIGHT: 62 IN | BODY MASS INDEX: 34.23 KG/M2 | SYSTOLIC BLOOD PRESSURE: 112 MMHG | WEIGHT: 186 LBS | DIASTOLIC BLOOD PRESSURE: 70 MMHG | OXYGEN SATURATION: 98 % | HEART RATE: 95 BPM

## 2021-04-21 PROCEDURE — 99072 ADDL SUPL MATRL&STAF TM PHE: CPT

## 2021-04-21 PROCEDURE — 99214 OFFICE O/P EST MOD 30 MIN: CPT | Mod: 25

## 2021-04-21 NOTE — REVIEW OF SYSTEMS
[Negative] : Respiratory [Fever] : no fever [Chills] : no chills [Feeling Tired] : not feeling tired [Eye Pain] : no eye pain [Red Eyes] : eyes not red [Earache] : no earache [Loss Of Hearing] : no hearing loss [Heart Rate Is Slow] : the heart rate was not slow [Heart Rate Is Fast] : the heart rate was not fast [Lower Ext Edema] : no lower extremity edema [Shortness Of Breath] : no shortness of breath [Wheezing] : no wheezing [SOB on Exertion] : no shortness of breath during exertion [Cough] : no cough [Abdominal Pain] : no abdominal pain [Constipation] : no constipation [Diarrhea] : no diarrhea [Dysuria] : no dysuria [Arthralgias] : no arthralgias [Joint Pain] : no joint pain [Confused] : no confusion [Convulsions] : no convulsions [Dizziness] : no dizziness [Suicidal] : not suicidal [Sleep Disturbances] : no sleep disturbances [Anxiety] : no anxiety [Muscle Weakness] : no muscle weakness [Easy Bleeding] : no tendency for easy bleeding [Easy Bruising] : no tendency for easy bruising [de-identified] : psoriasis; LE lesions

## 2021-04-21 NOTE — PHYSICAL EXAM
[General Appearance - Alert] : alert [General Appearance - In No Acute Distress] : in no acute distress [Strabismus] : no strabismus was seen [Sclera] : the sclera and conjunctiva were normal [Outer Ear] : the ears and nose were normal in appearance [Hearing Threshold Finger Rub Not [place]] : hearing was normal [Neck Appearance] : the appearance of the neck was normal [] : no respiratory distress [Respiration, Rhythm And Depth] : normal respiratory rhythm and effort [Auscultation Breath Sounds / Voice Sounds] : lungs were clear to auscultation bilaterally [Heart Rate And Rhythm] : heart rate was normal and rhythm regular [Heart Sounds] : normal S1 and S2 [Heart Sounds Gallop] : no gallops [Edema] : there was no peripheral edema [Abdomen Soft] : soft [Abdomen Tenderness] : non-tender [No CVA Tenderness] : no ~M costovertebral angle tenderness [Abnormal Walk] : normal gait [No Focal Deficits] : no focal deficits [Oriented To Time, Place, And Person] : oriented to person, place, and time

## 2021-04-21 NOTE — HISTORY OF PRESENT ILLNESS
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 Pt was tapered off steroids in the past and was also on high dose vitamin D.\par Hypercalcemia was attributed to be likely from flare up of sarcoidosis; 1-25(OH)2 vitamin D ~131)\par s/p IV hydration and 2 doses of calcitonin, started on prednisone 40 mg daily\par Metformin,  ACE-I and vitamin D were also stopped. She has now been restarted on ACE-I, on steroid taper- calcium worsened now back  on prednisone 20 mg daily.\par \par \par Today, \par She presents for follow up\par Feels well; no acute complaint\par Tolerating lasix 20 mg qod\par Sugars have been high\par \par \par

## 2021-04-22 ENCOUNTER — NON-APPOINTMENT (OUTPATIENT)
Age: 61
End: 2021-04-22

## 2021-04-22 LAB
25(OH)D3 SERPL-MCNC: 33.2 NG/ML
ALBUMIN SERPL ELPH-MCNC: 4.4 G/DL
ANION GAP SERPL CALC-SCNC: 17 MMOL/L
APPEARANCE: CLEAR
BACTERIA: NEGATIVE
BASOPHILS # BLD AUTO: 0.03 K/UL
BASOPHILS NFR BLD AUTO: 0.4 %
BILIRUBIN URINE: NEGATIVE
BLOOD URINE: NEGATIVE
BUN SERPL-MCNC: 33 MG/DL
CALCIUM SERPL-MCNC: 10.5 MG/DL
CALCIUM SERPL-MCNC: 10.5 MG/DL
CHLORIDE SERPL-SCNC: 101 MMOL/L
CO2 SERPL-SCNC: 24 MMOL/L
COLOR: YELLOW
CREAT SERPL-MCNC: 1.08 MG/DL
EOSINOPHIL # BLD AUTO: 0.09 K/UL
EOSINOPHIL NFR BLD AUTO: 1.1 %
GLUCOSE QUALITATIVE U: NEGATIVE
GLUCOSE SERPL-MCNC: 97 MG/DL
HCT VFR BLD CALC: 38.3 %
HGB BLD-MCNC: 11.5 G/DL
HYALINE CASTS: 3 /LPF
IMM GRANULOCYTES NFR BLD AUTO: 0.5 %
KETONES URINE: NEGATIVE
LEUKOCYTE ESTERASE URINE: NEGATIVE
LYMPHOCYTES # BLD AUTO: 1.18 K/UL
LYMPHOCYTES NFR BLD AUTO: 14.7 %
MAN DIFF?: NORMAL
MCHC RBC-ENTMCNC: 25.8 PG
MCHC RBC-ENTMCNC: 30 GM/DL
MCV RBC AUTO: 85.9 FL
MICROSCOPIC-UA: NORMAL
MONOCYTES # BLD AUTO: 0.57 K/UL
MONOCYTES NFR BLD AUTO: 7.1 %
NEUTROPHILS # BLD AUTO: 6.11 K/UL
NEUTROPHILS NFR BLD AUTO: 76.2 %
NITRITE URINE: NEGATIVE
PARATHYROID HORMONE INTACT: 32 PG/ML
PH URINE: 5.5
PHOSPHATE SERPL-MCNC: 3.2 MG/DL
PLATELET # BLD AUTO: 312 K/UL
POTASSIUM SERPL-SCNC: 5.1 MMOL/L
PROTEIN URINE: NEGATIVE
RBC # BLD: 4.46 M/UL
RBC # FLD: 15.2 %
RED BLOOD CELLS URINE: 1 /HPF
SODIUM SERPL-SCNC: 143 MMOL/L
SPECIFIC GRAVITY URINE: 1.02
SQUAMOUS EPITHELIAL CELLS: 2 /HPF
URATE SERPL-MCNC: 9.3 MG/DL
UROBILINOGEN URINE: NORMAL
WBC # FLD AUTO: 8.02 K/UL
WHITE BLOOD CELLS URINE: 2 /HPF

## 2021-04-23 ENCOUNTER — NON-APPOINTMENT (OUTPATIENT)
Age: 61
End: 2021-04-23

## 2021-04-23 LAB
CREAT SPEC-SCNC: 129 MG/DL
CREAT/PROT UR: 0.1 RATIO
PROT UR-MCNC: 10 MG/DL

## 2021-05-11 ENCOUNTER — EMERGENCY (EMERGENCY)
Facility: HOSPITAL | Age: 61
LOS: 1 days | Discharge: HOME | End: 2021-05-11
Attending: EMERGENCY MEDICINE
Payer: COMMERCIAL

## 2021-05-11 VITALS
HEIGHT: 62 IN | HEART RATE: 113 BPM | SYSTOLIC BLOOD PRESSURE: 152 MMHG | OXYGEN SATURATION: 97 % | RESPIRATION RATE: 18 BRPM | TEMPERATURE: 98 F | DIASTOLIC BLOOD PRESSURE: 100 MMHG

## 2021-05-11 VITALS — SYSTOLIC BLOOD PRESSURE: 138 MMHG | DIASTOLIC BLOOD PRESSURE: 94 MMHG | HEART RATE: 95 BPM

## 2021-05-11 PROCEDURE — 73630 X-RAY EXAM OF FOOT: CPT | Mod: 26,LT

## 2021-05-11 PROCEDURE — 99283 EMERGENCY DEPT VISIT LOW MDM: CPT | Mod: 25

## 2021-05-11 PROCEDURE — 99283 EMERGENCY DEPT VISIT LOW MDM: CPT

## 2021-05-11 PROCEDURE — 73630 X-RAY EXAM OF FOOT: CPT

## 2021-05-11 RX ADMIN — Medication 50 MILLIGRAM(S): at 21:45

## 2021-05-11 NOTE — ED PROVIDER NOTE - PATIENT PORTAL LINK FT
You can access the FollowMyHealth Patient Portal offered by [facility] by registering at the following website: [email or web address]. By joining [employer]’s FollowMyHealth portal, you will also be able to view your health information using other applications (apps) compatible with our system.

## 2021-05-11 NOTE — ED PROVIDER NOTE - PHYSICAL EXAMINATION
Const: Awake, alert and oriented. In no acute distress. Well appearing.  HEENT: NC/AT. Moist mucous membranes.  Eyes: No scleral icterus. EOMI.  Neck:. Soft and supple. Full ROM without pain.  Cardiac: +S1/S2. No murmurs. Peripheral pulses 2+ and symmetric. No LE edema.  Resp: Speaking in full sentences. No evidence of respiratory distress. No wheezes, rales or rhonchi.  Abd: Soft, non-tender, non-distended. Normal bowel sounds in all 4 quadrants. No guarding or rebound.  Back: Spine midline and non-tender. No CVAT.  MSK: Tenderness over left 1st digit, FROM in all extremities neurovascularly intact DP palpable   Skin: redness noted under the left 1st digit   Lymph: No cervical lymphadenopathy.  Neuro: Awake, alert & oriented x 3. Moves all extremities symmetrically.

## 2021-05-11 NOTE — ED PROVIDER NOTE - CARE PROVIDER_API CALL
Norberto Lubin (DPM)  Podiatric Medicine and Surgery  [address] [place] [address]  Phone: [phone]  Fax: [phone]  Follow Up Time:

## 2021-05-11 NOTE — ED PROVIDER NOTE - ATTENDING CONTRIBUTION TO CARE
I, Pete Louis, performed a face to face bedside interview with this patient regarding history of present illness, review of symptoms and relevant past medical, social and family history.  I completed an independent physical examination. I have communicated the patient’s plan of care and disposition with the ACP.  61 year old female with PMh sarcoid, CKD, DM, HTN, and gout presents with pain to the L great toe. Reports 2 weeks of pain to toe, and noticed today that it started to get red. Pt reports that she has had several gout attacks before, but always in her knees. No fevers, chills.  Gen: NAD, well appearing  CV: RRR  Pul: CTA b/l  Abd: Soft, non-distended, non-tender  Neuro: no focal deficits  msk: edema, tenderness, erythema to the L 1st MCP. No induration of skin.  The hx of gout and fact that the Sx are clearly localized to the MCP suggests gout. Pt states she cannot take NSAIDs given her ckd. Will increase prednisone dose (pt is on chronic low dose due to sarcoid)

## 2021-05-11 NOTE — ED PROVIDER NOTE - OBJECTIVE STATEMENT
pt is a 60 y/o female with a  pmhx of DM, HTN, sacardosis , gout presenting to the ed for left foot pain. pt states two weeks ago started with pain in her left foot then noticed redness under the 1st MCP. pt denies injuries or trauma to the area. pt denies cp sob fever abd pain nausea vomiting back pain numbness or loss of sensation abrasions or lacerations headache neck pain

## 2021-05-11 NOTE — ED PROVIDER NOTE - PROGRESS NOTE DETAILS
reviewed xray, pt cannot have colchine due to patient kidney problems per patient, pt to be on prednisone for gout pt explained dc instructions

## 2021-05-11 NOTE — ED ADULT TRIAGE NOTE - CHIEF COMPLAINT QUOTE
left great toe pain /redness/inflammation - hx gout ; no meds. left great toe pain /redness/inflammation - hx gout ; no meds. denies injury

## 2021-05-29 NOTE — ED ADULT TRIAGE NOTE - SOURCE OF INFORMATION
Patient is a 34 yo female  who experienced normal spontaneous vaginal delivery () at 40 weeks. Labor course was uncomplicated, delivery was uncomplicated. Postpartum course was unremarkable. Patient was transferred to postpartum floor and monitored. Patient is medically optimized for discharge and is instructed to follow up at the office in 4-6 weeks for routine postpartum care. Patient verbalizes understanding and agreement with treatment and follow up plan and is satisfied with her care. At the time of discharge, patient was ambulating independently, tolerating PO intake, voiding and stooling appropriately, passing flatus and pain is well controlled. 
Patient

## 2021-06-15 ENCOUNTER — APPOINTMENT (OUTPATIENT)
Dept: CT IMAGING | Facility: CLINIC | Age: 61
End: 2021-06-15
Payer: MEDICAID

## 2021-06-15 ENCOUNTER — OUTPATIENT (OUTPATIENT)
Dept: OUTPATIENT SERVICES | Facility: HOSPITAL | Age: 61
LOS: 1 days | End: 2021-06-15

## 2021-06-15 DIAGNOSIS — R91.8 OTHER NONSPECIFIC ABNORMAL FINDING OF LUNG FIELD: ICD-10-CM

## 2021-06-15 PROCEDURE — 71250 CT THORAX DX C-: CPT | Mod: 26

## 2021-06-18 ENCOUNTER — RX RENEWAL (OUTPATIENT)
Age: 61
End: 2021-06-18

## 2021-06-21 ENCOUNTER — APPOINTMENT (OUTPATIENT)
Dept: PULMONOLOGY | Facility: CLINIC | Age: 61
End: 2021-06-21
Payer: MEDICAID

## 2021-06-21 VITALS
DIASTOLIC BLOOD PRESSURE: 66 MMHG | SYSTOLIC BLOOD PRESSURE: 140 MMHG | OXYGEN SATURATION: 96 % | BODY MASS INDEX: 34.39 KG/M2 | WEIGHT: 188 LBS | HEART RATE: 120 BPM

## 2021-06-21 PROCEDURE — 99214 OFFICE O/P EST MOD 30 MIN: CPT

## 2021-06-21 RX ORDER — AMMONIUM LACTATE 12 %
12 CREAM (GRAM) TOPICAL
Qty: 385 | Refills: 0 | Status: DISCONTINUED | COMMUNITY
Start: 2021-04-17

## 2021-06-21 RX ORDER — CIPROFLOXACIN HYDROCHLORIDE 250 MG/1
250 TABLET, FILM COATED ORAL
Qty: 14 | Refills: 0 | Status: DISCONTINUED | COMMUNITY
Start: 2021-05-27

## 2021-06-21 RX ORDER — TACROLIMUS 0.3 MG/G
0.03 OINTMENT TOPICAL
Qty: 30 | Refills: 0 | Status: DISCONTINUED | COMMUNITY
Start: 2021-02-16

## 2021-06-21 RX ORDER — ENALAPRIL MALEATE 10 MG/1
10 TABLET ORAL
Qty: 90 | Refills: 0 | Status: DISCONTINUED | COMMUNITY
Start: 2021-05-17

## 2021-06-21 RX ORDER — TRIAMCINOLONE ACETONIDE 1 MG/G
0.1 CREAM TOPICAL
Qty: 454 | Refills: 0 | Status: DISCONTINUED | COMMUNITY
Start: 2021-01-20

## 2021-06-21 NOTE — HISTORY OF PRESENT ILLNESS
[Excess Weight] : excess weight [Dyspnea] : dyspnea [Joint Pain] : joint pain [Low Calorie Diet] : low calorie diet [Fair Compliance] : fair compliance with treatment [Fair Tolerance] : fair tolerance of treatment [Diabetes] : diabetes [Hypertension] : hypertension [High] : high [Low Calorie] : low calorie [Well Balanced Diet] : well balanced meals [Infrequently] : exercises infrequently [Walking] : walking [Follow-Up - Routine Clinic] : a routine clinic follow-up of [Excessive Daytime Sleepiness] : excessive daytime sleepiness [Snoring] : snoring [Unrefreshing Sleep] : unrefreshing sleep [Sleepy When Sedentary] : sleepy when sedentary [Currently Experiencing] : The patient is currently experiencing symptoms. [None] : No associated symptoms are reported [CPAP] : CPAP [Poor Compliance] : poor compliance with treatment [Poor Tolerance] : poor tolerance of treatment [Poor Symptom Control] : poor symptom control [FreeTextEntry1] : The patient recently suffered an ankle injury and was on crutches and a wheelchair but now is walking without support.\par She is followed by hematology for anemia.\par She has been on prednisone per nephrology for hypercalcemia. [Witnessed Apnea During Sleep] : no witnessed apnea during sleep [Witnessed Gasping During Sleep] : no witnessed gasping during sleep [de-identified] : at 6 cm of water

## 2021-06-21 NOTE — REASON FOR VISIT
[Follow-Up] : a follow-up visit [Abnormal CXR/ Chest CT] : an abnormal CXR/ chest CT [Sleep Apnea] : sleep apnea [Sarcoidosis] : sarcoidosis [Obesity] : obesity

## 2021-06-21 NOTE — CONSULT LETTER
[Dear  ___] : Dear  [unfilled], [Consult Letter:] : I had the pleasure of evaluating your patient, [unfilled]. [Please see my note below.] : Please see my note below. [Consult Closing:] : Thank you very much for allowing me to participate in the care of this patient.  If you have any questions, please do not hesitate to contact me. [Sincerely,] : Sincerely, [DrYasmani  ___] : Dr. GASPAR [DrYasmani ___] : Dr. GASPAR [FreeTextEntry3] : Leland Sims MD, FCCP, D. ABSM\par Pulmonary and Sleep Medicine\par [facility] [place]\par \par

## 2021-06-21 NOTE — PHYSICAL EXAM
[General Appearance - Well Developed] : well developed [Normal Appearance] : normal appearance [General Appearance - In No Acute Distress] : no acute distress [Normal Conjunctiva] : the conjunctiva exhibited no abnormalities [Neck Appearance] : the appearance of the neck was normal [Heart Rate And Rhythm] : heart rate and rhythm were normal [Murmurs] : no murmurs present [Heart Sounds] : normal S1 and S2 [Edema] : no peripheral edema present [] : no respiratory distress [Respiration, Rhythm And Depth] : normal respiratory rhythm and effort [Exaggerated Use Of Accessory Muscles For Inspiration] : no accessory muscle use [Auscultation Breath Sounds / Voice Sounds] : lungs were clear to auscultation bilaterally [Abdomen Soft] : soft [Abdomen Tenderness] : non-tender [Nail Clubbing] : no clubbing of the fingernails [Cyanosis, Localized] : no localized cyanosis [No Focal Deficits] : no focal deficits [Oriented To Time, Place, And Person] : oriented to person, place, and time [FreeTextEntry1] : Patient in wheelchair

## 2021-06-21 NOTE — DISCUSSION/SUMMARY
[FreeTextEntry1] : \par #1. CPAP at 6 cm of water to treat moderate to severe WALTER; encouraged compliance in the past though pt is not compliant; Consider dental evaluation for an oral appliance as alternative therapy for treatment of WALTER though no one takes her insurance so have referred pt to the Dr. Access line; The patient understands the risks of untreated WALTER including heart disease, strokes, hypertension, pulmonary hypertension, and motor vehicle accidents as well as the need for treatment and weight loss. \par #2. Lung function testing have been normal; will repeat annually\par #3. Diet and exercise for weight loss\par #4. Repeat chest CT to f/u sarcoidosis as above; pt is on prednisone for hypercalcemia likely from sarcoidosis; repeat CT revealed stable changes; consider repeat CT in one year given current stability\par #5. F/u in 6 months with PFTs and again in one year with chest CT to f/u nodules\par #6. Replace equipment as needed; ordered 6/21/21\par #7. SOBOE is likely related to weight or deconditioning given normal PFTs\par #8. The patient does not appear to require BD therapy at this time\par #9. Follow ACE and Ca levels per rheumatology; she is on prednisone for hypercalcemia thought to be due to her sarcoidosis per nephrology\par #10. Referred patient for evaluation by obesity medicine again; pt has apt in July\par #11. Rheumatology and renal f/u\par #12. Reviewed risks of exposure and symptoms of Covid-19 virus, including how the virus is spread and precautions to avoid kerrie virus.\par \par Patient's questions were answered and patient appears to understand these recommendations

## 2021-06-23 ENCOUNTER — APPOINTMENT (OUTPATIENT)
Dept: NEPHROLOGY | Facility: CLINIC | Age: 61
End: 2021-06-23
Payer: MEDICAID

## 2021-06-23 VITALS
HEIGHT: 55 IN | DIASTOLIC BLOOD PRESSURE: 70 MMHG | RESPIRATION RATE: 16 BRPM | OXYGEN SATURATION: 97 % | SYSTOLIC BLOOD PRESSURE: 112 MMHG | WEIGHT: 185 LBS | HEART RATE: 94 BPM | BODY MASS INDEX: 42.81 KG/M2

## 2021-06-23 PROCEDURE — 99215 OFFICE O/P EST HI 40 MIN: CPT

## 2021-06-23 NOTE — REVIEW OF SYSTEMS
[Negative] : Respiratory [Fever] : no fever [Chills] : no chills [Feeling Tired] : not feeling tired [Eye Pain] : no eye pain [Red Eyes] : eyes not red [Earache] : no earache [Loss Of Hearing] : no hearing loss [Heart Rate Is Slow] : the heart rate was not slow [Heart Rate Is Fast] : the heart rate was not fast [Lower Ext Edema] : no lower extremity edema [Shortness Of Breath] : no shortness of breath [Wheezing] : no wheezing [Cough] : no cough [SOB on Exertion] : no shortness of breath during exertion [Abdominal Pain] : no abdominal pain [Constipation] : no constipation [Diarrhea] : no diarrhea [Dysuria] : no dysuria [Arthralgias] : no arthralgias [Joint Pain] : no joint pain [Confused] : no confusion [Convulsions] : no convulsions [Dizziness] : no dizziness [Suicidal] : not suicidal [Sleep Disturbances] : no sleep disturbances [Anxiety] : no anxiety [Muscle Weakness] : no muscle weakness [Easy Bleeding] : no tendency for easy bleeding [Easy Bruising] : no tendency for easy bruising [de-identified] : psoriasis; LE lesions

## 2021-06-23 NOTE — HISTORY OF PRESENT ILLNESS
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 Pt was tapered off steroids in the past and was also on high dose vitamin D.\par Hypercalcemia was attributed to be likely from flare up of sarcoidosis; 1-25(OH)2 vitamin D ~131)\par s/p IV hydration and 2 doses of calcitonin, started on prednisone 40 mg daily\par Metformin,  ACE-I and vitamin D were also stopped. She has now been restarted on ACE-I, on steroid taper- calcium worsened now back  on prednisone 20 mg daily.\par \par \par Today, \par She presents for follow up\par Feels well; no acute complaint\par Recently had some dental work done, prescribed Clindaymcin\par Tolerating lasix 20 mg qod, on prednisone 10 mg daily\par Recently had labs with PCP, SCa++ was 9.5, GFR 57 ml/min\par HbA1c 6.9\par \par

## 2021-06-23 NOTE — PHYSICAL EXAM
[General Appearance - Alert] : alert [General Appearance - In No Acute Distress] : in no acute distress [Sclera] : the sclera and conjunctiva were normal [Strabismus] : no strabismus was seen [Outer Ear] : the ears and nose were normal in appearance [Hearing Threshold Finger Rub Not [place]] : hearing was normal [Neck Appearance] : the appearance of the neck was normal [] : no respiratory distress [Respiration, Rhythm And Depth] : normal respiratory rhythm and effort [Auscultation Breath Sounds / Voice Sounds] : lungs were clear to auscultation bilaterally [Heart Rate And Rhythm] : heart rate was normal and rhythm regular [Heart Sounds] : normal S1 and S2 [Heart Sounds Gallop] : no gallops [Edema] : there was no peripheral edema [Abdomen Soft] : soft [Abdomen Tenderness] : non-tender [No CVA Tenderness] : no ~M costovertebral angle tenderness [Abnormal Walk] : normal gait [No Focal Deficits] : no focal deficits [Oriented To Time, Place, And Person] : oriented to person, place, and time [FreeTextEntry1] : psoriatic rash on both legs+

## 2021-06-23 NOTE — ASSESSMENT
[FreeTextEntry1] : 1) CKD III in the setting of HTN, DM, Gout, NSAID use with superimposed MALIKA from hypercalcemia\par Scr now stable at 1.2\par \par 2) Hypercalcemia\par 1-25(OH)2 vitamin D mediated - from Sarcoidosis \par Previously on high dose vitamin D which has been stopped. She is currently on prednisone taper -10 mg daily\par Sca++ improved to 9.5; also she has had multiple rebounds of sarcoid with SHORT tapers;  prolonging therapy at this dose.\par Will decrease prednisone to 5 mg daily, repeat BMP next month\par Continue Lasix 20 mg qod  for hypercalciuric effect.\par \par \par 3) HTN\par BP stable; continue lasix and Enalapril\par \par \par 4) DM\par Resumed Metformin\par Also on Glipizide \par Mgt as per PCP\par \par RTC in 2 months\par \par \par \par \par \par \par

## 2021-07-14 ENCOUNTER — APPOINTMENT (OUTPATIENT)
Dept: BARIATRICS/WEIGHT MGMT | Facility: CLINIC | Age: 61
End: 2021-07-14
Payer: MEDICAID

## 2021-07-14 VITALS
SYSTOLIC BLOOD PRESSURE: 125 MMHG | TEMPERATURE: 97.5 F | OXYGEN SATURATION: 100 % | WEIGHT: 184.38 LBS | BODY MASS INDEX: 34.81 KG/M2 | HEIGHT: 61 IN | HEART RATE: 96 BPM | DIASTOLIC BLOOD PRESSURE: 77 MMHG

## 2021-07-14 PROCEDURE — 99204 OFFICE O/P NEW MOD 45 MIN: CPT

## 2021-07-14 RX ORDER — GLIPIZIDE 2.5 MG/1
2.5 TABLET, FILM COATED, EXTENDED RELEASE ORAL
Qty: 30 | Refills: 1 | Status: DISCONTINUED | COMMUNITY
Start: 2020-11-05 | End: 2021-07-14

## 2021-07-14 RX ORDER — DULOXETINE HYDROCHLORIDE 20 MG/1
20 CAPSULE, DELAYED RELEASE PELLETS ORAL
Qty: 30 | Refills: 1 | Status: DISCONTINUED | COMMUNITY
Start: 2020-07-08 | End: 2021-07-14

## 2021-07-14 RX ORDER — GLIPIZIDE 2.5 MG/1
2.5 TABLET, EXTENDED RELEASE ORAL
Qty: 90 | Refills: 0 | Status: DISCONTINUED | COMMUNITY
Start: 2020-07-14 | End: 2021-07-14

## 2021-07-14 NOTE — HISTORY OF PRESENT ILLNESS
[Improved Health] : Improved health [Quality of Life] : Quality of life [Improved Mobility] : Improved mobility [Improve Mood] : Improved mood [Decrease Medications] : Decrease medications [Young Adult] : yound adult [Medical conditions] : medical conditions [Medications] : medications [I usually sleep 6-8 hours] : I usually sleep 6-8 hours [Breakfast] : breakfast [Dinner] : dinner [Afternoon] : afternoon [Late night] : late night [1-2] : Meat, fish, poultry, eggs, cheese: 1-2 [Less than 1] : Sweets: less than 1 [6] : How many cups of water do you regularly drink per day: 6 [Other: ____] : [unfilled] [Self] : self [Skip Meals] : skip meals [Often Go For Seconds] : often go for seconds [1 mile] : Walking distance capability:1 mile [Walking] : walking [None] : none [0] : 0 [FreeTextEntry3] : 190 [] : No [FreeTextEntry1] : Followed byDr Sims due to a history of sarcoidosis and WALTER ( non compliant with her cpap)  makes her anxious rec to lose weight . She has multiple medical issues that confound what she can eat. Gout, DM, renal disease. She recently was discontinued from her glipizide and on metformin , but still on prednisone and metoprolol. \par \par breakfast: 10: 30 \par bread with butterx 2 and black coffee\par \par no snack \par \par Dinner( 6 pm ) \par green beans, small potatoes  and chicken ( no skin) \par \par snack  10 pm ( +/-)\par coffee 1-2 slices of bread, with ham \par \par bed : 12-1 am \par awakens at about 6 am

## 2021-07-14 NOTE — PHYSICAL EXAM
[Obese, well nourished, in no acute distress] : obese, well nourished, in no acute distress [Normal] : PERRL, EOMI, no conjunctival injection, anicteric [de-identified] : moon facie due to pred  [de-identified] : normal, with kyphosis of upper back  [de-identified] : obese soft non tender  [de-identified] : psoriasis on lower extremities

## 2021-07-14 NOTE — ASSESSMENT
[FreeTextEntry1] : 61 year old woman with multiple medical issues that will make her wt loss more difficult but not impossible\par DM\par gout\par sarcoidosis\par HTN\par HLD\par on multiple medications that make at best difficult to lose weight and at worst cause wt gain\par pred, Toprol, \par Already on the metformin, but may in the future benefit from an SGLT2 inhibitor which would prevent renal  disease progression, cause wt loss( may not be covered by her insurance ) . Her DM is generally controlled about 6 but had to increase her pred and her a1c went to 6.9 but now again being decreased . \par \par She would benefit from a plant based diet that will not increase her uric acid and would benefit from speaking with a nutritionist . \par Will do labs and follow up in 4 weeks\par bring a log of her food as well. \par 60 min

## 2021-07-14 NOTE — REASON FOR VISIT
[Initial Consultation] : an initial consultation for [FreeTextEntry2] : Here for help with wt loss, referred by Dr Sims

## 2021-07-28 ENCOUNTER — APPOINTMENT (OUTPATIENT)
Dept: ENDOCRINOLOGY | Facility: CLINIC | Age: 61
End: 2021-07-28
Payer: MEDICAID

## 2021-07-28 VITALS — SYSTOLIC BLOOD PRESSURE: 126 MMHG | DIASTOLIC BLOOD PRESSURE: 74 MMHG

## 2021-07-28 VITALS — WEIGHT: 185 LBS | HEART RATE: 86 BPM | HEIGHT: 61 IN | BODY MASS INDEX: 34.93 KG/M2 | OXYGEN SATURATION: 98 %

## 2021-07-28 DIAGNOSIS — Z83.3 FAMILY HISTORY OF DIABETES MELLITUS: ICD-10-CM

## 2021-07-28 DIAGNOSIS — Z87.39 PERSONAL HISTORY OF OTHER DISEASES OF THE MUSCULOSKELETAL SYSTEM AND CONNECTIVE TISSUE: ICD-10-CM

## 2021-07-28 DIAGNOSIS — Z86.39 PERSONAL HISTORY OF OTHER ENDOCRINE, NUTRITIONAL AND METABOLIC DISEASE: ICD-10-CM

## 2021-07-28 LAB — GLUCOSE BLDC GLUCOMTR-MCNC: 107

## 2021-07-28 PROCEDURE — 82962 GLUCOSE BLOOD TEST: CPT

## 2021-07-28 PROCEDURE — 99204 OFFICE O/P NEW MOD 45 MIN: CPT | Mod: 25

## 2021-07-29 ENCOUNTER — NON-APPOINTMENT (OUTPATIENT)
Age: 61
End: 2021-07-29

## 2021-07-30 ENCOUNTER — LABORATORY RESULT (OUTPATIENT)
Age: 61
End: 2021-07-30

## 2021-08-02 LAB
ANION GAP SERPL CALC-SCNC: 11 MMOL/L
BUN SERPL-MCNC: 28 MG/DL
CALCIUM SERPL-MCNC: 9.9 MG/DL
CHLORIDE SERPL-SCNC: 102 MMOL/L
CO2 SERPL-SCNC: 26 MMOL/L
CREAT SERPL-MCNC: 1.2 MG/DL
GLUCOSE SERPL-MCNC: 92 MG/DL
POTASSIUM SERPL-SCNC: 4.1 MMOL/L
SODIUM SERPL-SCNC: 140 MMOL/L

## 2021-08-02 NOTE — HISTORY OF PRESENT ILLNESS
[FreeTextEntry1] : Pt sent here for eval of T2DM \par  by nephrology \par  Pt wit h/o T2DM x 20 years \par \par initally pt had GDM then developed T2DM \par \par Current RX  MFN  mg qd-bid \par USing COnotur next meter to test BS \par never met with RD CDE \par  Seeign opthalmology  NO DR \par \par Diet  \par B- eggon roll\par  lunch skips\par  Dinner  rice potato salad   green bean \par \par snack apple sauce \par occ salty snacks \par \par am numbers   \par  Dinner 200's \par no low BS \par \par PMH\par psoriatic arthritis \par  gout\par HTN\par  high chol \par apparently had a gland  removed from on top of her left kidney \par  had the surgery over 20 years ago  thinks may have had Cushing syndrome - surgery [facility] in [facility] \par she does not recognize if I say Pheochrmocytoma or aldosteone but recognizes Cushings \par \par \par FH  Mom DM \par 1 daughter healthy \par  \par \par SocHX born [place] in [place]  no chem NO XRT \par nonsmoker

## 2021-08-02 NOTE — PHYSICAL EXAM
[Alert] : alert [Well Nourished] : well nourished [No Acute Distress] : no acute distress [Well Developed] : well developed [Normal Sclera/Conjunctiva] : normal sclera/conjunctiva [EOMI] : extra ocular movement intact [No Proptosis] : no proptosis [Normal Oropharynx] : the oropharynx was normal [Thyroid Not Enlarged] : the thyroid was not enlarged [No Thyroid Nodules] : no palpable thyroid nodules [No Respiratory Distress] : no respiratory distress [No Accessory Muscle Use] : no accessory muscle use [Clear to Auscultation] : lungs were clear to auscultation bilaterally [Normal S1, S2] : normal S1 and S2 [Normal Rate] : heart rate was normal [Regular Rhythm] : with a regular rhythm [No Edema] : no peripheral edema [Pedal Pulses Normal] : the pedal pulses are present [Normal Anterior Cervical Nodes] : no anterior cervical lymphadenopathy [Normal Posterior Cervical Nodes] : no posterior cervical lymphadenopathy [No Spinal Tenderness] : no spinal tenderness [Spine Straight] : spine straight [No Stigmata of Cushings Syndrome] : no stigmata of Cushings Syndrome [Normal Gait] : normal gait [Normal Strength/Tone] : muscle strength and tone were normal [No Rash] : no rash [Normal Reflexes] : deep tendon reflexes were 2+ and symmetric [No Tremors] : no tremors [Oriented x3] : oriented to person, place, and time [Acanthosis Nigricans] : no acanthosis nigricans

## 2021-08-02 NOTE — ASSESSMENT
[FreeTextEntry1] : T2DM\par  cont RX  \par  can take MFN bid\par  meet with RD CDE \par \par ?Cushing syndrome- will cehck CT scan abd  non cotrast\par \par psoriatc arthrisit seeing rheum \par  sarcoidosiss- ON pred 5 mg qd \par \par

## 2021-08-12 ENCOUNTER — OUTPATIENT (OUTPATIENT)
Dept: OUTPATIENT SERVICES | Facility: HOSPITAL | Age: 61
LOS: 1 days | End: 2021-08-12

## 2021-08-12 ENCOUNTER — APPOINTMENT (OUTPATIENT)
Dept: ULTRASOUND IMAGING | Facility: CLINIC | Age: 61
End: 2021-08-12
Payer: MEDICAID

## 2021-08-12 DIAGNOSIS — I10 ESSENTIAL (PRIMARY) HYPERTENSION: ICD-10-CM

## 2021-08-12 PROCEDURE — 76536 US EXAM OF HEAD AND NECK: CPT | Mod: 26

## 2021-08-18 ENCOUNTER — APPOINTMENT (OUTPATIENT)
Dept: BARIATRICS/WEIGHT MGMT | Facility: CLINIC | Age: 61
End: 2021-08-18

## 2021-08-26 ENCOUNTER — APPOINTMENT (OUTPATIENT)
Dept: NEPHROLOGY | Facility: CLINIC | Age: 61
End: 2021-08-26
Payer: MEDICAID

## 2021-08-26 ENCOUNTER — APPOINTMENT (OUTPATIENT)
Dept: ORTHOPEDIC SURGERY | Facility: CLINIC | Age: 61
End: 2021-08-26
Payer: MEDICAID

## 2021-08-26 VITALS
SYSTOLIC BLOOD PRESSURE: 128 MMHG | OXYGEN SATURATION: 98 % | HEART RATE: 100 BPM | DIASTOLIC BLOOD PRESSURE: 70 MMHG | HEIGHT: 61 IN | BODY MASS INDEX: 35.12 KG/M2 | RESPIRATION RATE: 16 BRPM | WEIGHT: 186 LBS

## 2021-08-26 VITALS
HEART RATE: 113 BPM | BODY MASS INDEX: 34.93 KG/M2 | WEIGHT: 185 LBS | HEIGHT: 61 IN | TEMPERATURE: 98.1 F | SYSTOLIC BLOOD PRESSURE: 140 MMHG | DIASTOLIC BLOOD PRESSURE: 82 MMHG

## 2021-08-26 DIAGNOSIS — M25.521 PAIN IN RIGHT ELBOW: ICD-10-CM

## 2021-08-26 PROCEDURE — 99214 OFFICE O/P EST MOD 30 MIN: CPT

## 2021-08-26 NOTE — REVIEW OF SYSTEMS
[Negative] : Respiratory [Fever] : no fever [Chills] : no chills [Feeling Tired] : not feeling tired [Eye Pain] : no eye pain [Red Eyes] : eyes not red [Earache] : no earache [Loss Of Hearing] : no hearing loss [Heart Rate Is Slow] : the heart rate was not slow [Heart Rate Is Fast] : the heart rate was not fast [Lower Ext Edema] : no lower extremity edema [Shortness Of Breath] : no shortness of breath [Wheezing] : no wheezing [Cough] : no cough [SOB on Exertion] : no shortness of breath during exertion [Abdominal Pain] : no abdominal pain [Constipation] : no constipation [Diarrhea] : no diarrhea [Dysuria] : no dysuria [Arthralgias] : no arthralgias [Confused] : no confusion [Convulsions] : no convulsions [Dizziness] : no dizziness [Suicidal] : not suicidal [Sleep Disturbances] : no sleep disturbances [Anxiety] : no anxiety [Muscle Weakness] : no muscle weakness [Easy Bleeding] : no tendency for easy bleeding [Easy Bruising] : no tendency for easy bruising [de-identified] : psoriasis; LE lesions

## 2021-08-26 NOTE — PHYSICAL EXAM
[General Appearance - Alert] : alert [General Appearance - In No Acute Distress] : in no acute distress [Sclera] : the sclera and conjunctiva were normal [Strabismus] : no strabismus was seen [Outer Ear] : the ears and nose were normal in appearance [Hearing Threshold Finger Rub Not [place]] : hearing was normal [Neck Appearance] : the appearance of the neck was normal [] : no respiratory distress [Respiration, Rhythm And Depth] : normal respiratory rhythm and effort [Auscultation Breath Sounds / Voice Sounds] : lungs were clear to auscultation bilaterally [Heart Rate And Rhythm] : heart rate was normal and rhythm regular [Heart Sounds] : normal S1 and S2 [Heart Sounds Gallop] : no gallops [Edema] : there was no peripheral edema [Abdomen Soft] : soft [Abdomen Tenderness] : non-tender [No CVA Tenderness] : no ~M costovertebral angle tenderness [Abnormal Walk] : normal gait [No Focal Deficits] : no focal deficits [Oriented To Time, Place, And Person] : oriented to person, place, and time [FreeTextEntry1] : psoriatic rash on both legs+

## 2021-08-26 NOTE — HISTORY OF PRESENT ILLNESS
[de-identified] : This is a RHD female who presents with c/o right elbow pain. Patient reports the elbow has been sore for 2 days and is worsening in severity. Patient reports she banged her elbow 4 days ago, but pain didn't start until 2 days later. She then hit the elbow into something again after it was already painful. She is unable to touch the elbow secondary to pain. The elbow hurts to move. She admits to redness and swelling. She has diabetes and is concerned. \par \par

## 2021-08-26 NOTE — DISCUSSION/SUMMARY
[de-identified] : X-rays were reviewed with patient.\par The pathophysiology and anatomy were reviewed in detail with the patient, who expressed understanding.\par An Rx for minocycline was sent to patient's pharmacy. She was instructed to go to the emergency room if her symptoms worsen. She will otherwise f/u in one week for re-evaluation.\par

## 2021-08-26 NOTE — PHYSICAL EXAM
[Normal] : Gait: normal [de-identified] : Alert & oriented to person place & time\par No acute distress. Normal affect.\par Normocephalic atraumatic\par Extraocular muscles intact \par Normal respiratory rate and effort, no nasal flaring or use of accessory muscles \par Skin warm and well perfused, no edema, no lymphadenopathy\par \par Right Elbow:\par No deformity\par +erythema posteriorly, proximal to olecranon extending to the proximal 1/3 of the forearm\par +swelling\par + exquisite tenderness to palpation over erythematous area\par Pain with ROM which is limited as a result\par FROM all fingers\par 5/5 motor elbow, wrist and hand\par sensation intact to light touch\par 2+ radial pulse\par \par  [de-identified] : X-rays obtained of the right elbow reveal early osteophyte production at the olecranon. There are small calcific densities in the region of the olecranon bursa.

## 2021-08-26 NOTE — REASON FOR VISIT
[Initial Visit] : an initial visit for [[employer] ] : provided by [employer]   [FreeTextEntry1] : [id number] [FreeTextEntry2] : Spring [TWNoteComboBox1] : [place]

## 2021-08-26 NOTE — HISTORY OF PRESENT ILLNESS
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

## 2021-08-26 NOTE — ASSESSMENT
[FreeTextEntry1] : 1) CKD III in the setting of HTN, DM, Gout, NSAID use with superimposed MALIKA from hypercalcemia\par Scr now stable at 1.2\par \par 2) Hypercalcemia\par 1-25(OH)2 vitamin D mediated - from Sarcoidosis \par Previously on high dose vitamin D which has been stopped. She is currently on prednisone taper -10 mg daily\par Sca++ improved to 9.5; also she has had multiple rebounds of sarcoid with SHORT tapers;  prolonging therapy at this dose.\par Continue prednisone 5 mg daily; Lasix 20 mg qod  for hypercalciuric effect.\par \par 3) Elbow gout\par Ortho eval\par may get short course of high dose steroids and resume 5 mg daily\par Will taper off steroids on next visit\par obtain uric acid levels on next visit; start allopurinol based on results\par \par \par 3) HTN\par BP stable; continue lasix and Enalapril\par \par \par 4) DM\par Resumed Metformin\par Also on Glipizide \par Mgt as per PCP\par \par RTC in 2 months\par \par \par \par \par \par \par

## 2021-08-31 ENCOUNTER — APPOINTMENT (OUTPATIENT)
Dept: ORTHOPEDIC SURGERY | Facility: CLINIC | Age: 61
End: 2021-08-31
Payer: MEDICAID

## 2021-08-31 VITALS
BODY MASS INDEX: 34.93 KG/M2 | HEIGHT: 61 IN | WEIGHT: 185 LBS | DIASTOLIC BLOOD PRESSURE: 82 MMHG | HEART RATE: 99 BPM | SYSTOLIC BLOOD PRESSURE: 147 MMHG

## 2021-08-31 DIAGNOSIS — M71.121 OTHER INFECTIVE BURSITIS, RIGHT ELBOW: ICD-10-CM

## 2021-08-31 DIAGNOSIS — L03.119 CELLULITIS OF UNSPECIFIED PART OF LIMB: ICD-10-CM

## 2021-08-31 DIAGNOSIS — M70.21 OLECRANON BURSITIS, RIGHT ELBOW: ICD-10-CM

## 2021-08-31 PROCEDURE — 99212 OFFICE O/P EST SF 10 MIN: CPT

## 2021-08-31 NOTE — PHYSICAL EXAM
[UE/LE] : Sensory: Intact in bilateral upper & lower extremities [ALL] : dorsalis pedis, posterior tibial, femoral, popliteal, and radial 2+ and symmetric bilaterally [Normal Finger/nose] : finger to nose coordination [Normal] : no peripheral adenopathy appreciated [Poor Appearance] : well-appearing [Acute Distress] : not in acute distress [de-identified] : Right Elbow Exam\par \par Skin is intact with no breaks in the skin, lacerations, or abrasions. No drainage or fluctuance. There is improvement in the erythema and swelling. There is still some tenderness to palpation. Range of motion of the elbow is fully intact with flexion and extension, pronation and supination. Sensation is grossly intact and distal pulses are 2+. [de-identified] : CAROLINAR [de-identified] : No xray images were obtained at this visit.\par

## 2021-08-31 NOTE — ASSESSMENT
[FreeTextEntry1] : Patient is healing well from a septic olecranon bursitis. The nature of this condition was described at length with the patient she verbalized understanding. I recommend continuing and completing her dose of minocycline. She will work on range of motion of the elbow to prevent stiffness. I recommend that she is diligent about checking her blood sugars and do her best to prevent large spikes. She will return to the office if there is increased erythema, increased warmth, increased pain, fevers or chills. The patient verbalized complete understanding and is in complete agreement with this plan.

## 2021-08-31 NOTE — HISTORY OF PRESENT ILLNESS
[de-identified] : 08/31/2021: Patient is a 61 year-old, right-hand-dominant female who presents to the office today for followup of her right elbow pain and swelling. The patient had hit her elbow nearly 2 weeks ago. Last week when she presented, she had pain, swelling and erythema to the elbow. She was prescribed minocycline at the time. Since then, she has seen improvement in the pain and erythema. She notes she has been on top of her blood sugars. She denies fevers or chills.

## 2021-11-17 ENCOUNTER — APPOINTMENT (OUTPATIENT)
Dept: ENDOCRINOLOGY | Facility: CLINIC | Age: 61
End: 2021-11-17
Payer: MEDICAID

## 2021-11-17 VITALS
DIASTOLIC BLOOD PRESSURE: 70 MMHG | BODY MASS INDEX: 34.17 KG/M2 | WEIGHT: 181 LBS | HEIGHT: 61 IN | SYSTOLIC BLOOD PRESSURE: 120 MMHG

## 2021-11-17 LAB — GLUCOSE BLDC GLUCOMTR-MCNC: 89

## 2021-11-17 PROCEDURE — 82962 GLUCOSE BLOOD TEST: CPT

## 2021-11-17 PROCEDURE — 99214 OFFICE O/P EST MOD 30 MIN: CPT | Mod: 25

## 2021-11-22 NOTE — ASSESSMENT
[FreeTextEntry1] : T2DM\par  cont RX  \par  can take MFN bid\par  meet with RD CDE \par \par ?Cushing syndrome-  willreperitn RX for CT scan of abdomen \par \par psoriatc arthrisit seeing rheum \par  sarcoidosiss- ON pred 5 mg qd  reveiwd stressd ose steroids  unabel to test \par \par really forcortisol  as is on Prednisoen

## 2021-11-22 NOTE — HISTORY OF PRESENT ILLNESS
[FreeTextEntry1] :  here for  foollowup  of T2DM \par \par  Pt wit h/o T2DM x 20 years \par \par initally pt had GDM then developed T2DM \par \par Current RX  MFN  mg qd-bid \par USing COnotur next meter to test BS \par never met with RD CDE \par  Seeign opthalmology  NO DR \par \par Diet  \par B- eggon roll\par  lunch skips\par  Dinner  rice potato salad   green bean \par \par snack apple sauce \par occ salty snacks \par \par am numbers   \par  Dinner 200's \par no low BS \par \par PMH\par psoriatic arthritis \par  gout\par HTN\par  high chol \par apparently had a gland  removed from on top of her left kidney \par  had the surgery over 20 years ago  thinks may have had Cushing syndrome - surgery [facility] in [facility] \par she does not recognize if I say Pheochrmocytoma or aldosteone but recognizes Cushings \par \par \par never did CT scan of abdomen \par \par FH  Mom DM \par 1 daughter healthy \par  \par \par SocHX born [place] in [place]  no chem NO XRT \par nonsmoker

## 2021-11-24 NOTE — ED ADULT NURSE NOTE - CADM POA CENTRAL LINE
"        Flavia Hansenso   3/23/2017 1:20 PM   Office Visit   MRN: [id number]    Department:  [facility]   Dept Phone:  [phone]    Description:  Female : 1965   Provider:  Ye Lehman M.D.           Reason for Visit     New Patient migraines      Allergies as of 3/23/2017     Allergen Noted Reactions    Hydrocodone 2016   Itching    Oxycodone 2016   Itching      You were diagnosed with     Chronic migraine   [[id number]]         Vital Signs     Blood Pressure Pulse Temperature Height Weight Body Mass Index    138/90 mmHg 71 36.8 °C (98.2 °F) 1.651 m (5' 5\") 73.936 kg (163 lb) 27.12 kg/m2    Oxygen Saturation Smoking Status                96% Never Smoker           Basic Information     Date Of Birth Sex Race Ethnicity Preferred Language    1965 Female Unknown Non- English      Problem List              ICD-10-CM Priority Class Noted - Resolved    Hyperlipidemia E78.5 Low  10/29/2012 - Present    GERD (gastroesophageal reflux disease) K21.9   Unknown - Present    History of peptic ulcer Z87.11   2016 - Present    Chronic migraine without aura without status migrainosus, not intractable G43.709   2016 - Present    History of motion sickness Z87.898   2016 - Present    Obesity (BMI 30-39.9) E66.9   2016 - Present      Health Maintenance        Date Due Completion Dates    IMM DTaP/Tdap/Td Vaccine (1 - Tdap) 1983 ---    MAMMOGRAM 2016, 2015, 2015, 2015, 2011    IMM INFLUENZA (1) 2018 (Originally 2016) ---    PAP SMEAR 2018 (Prv Comp)    Override on 2015: Previously completed    COLONOSCOPY 3/1/2021 3/1/2011 (Prv Comp)    Override on 3/1/2011: Previously completed            Current Immunizations     No immunizations on file.      Below and/or attached are the medications your provider expects you to take. Review all of your home medications and newly ordered medications with your provider " and/or pharmacist. Follow medication instructions as directed by your provider and/or pharmacist. Please keep your medication list with you and share with your provider. Update the information when medications are discontinued, doses are changed, or new medications (including over-the-counter products) are added; and carry medication information at all times in the event of emergency situations     Allergies:  HYDROCODONE - Itching     OXYCODONE - Itching               Medications  Valid as of: March 23, 2017 -  3:17 PM    Generic Name Brand Name Tablet Size Instructions for use    Estradiol-Estriol-Progesterone (Cream) BIEST/PROGESTERONE  Apply 1 g to skin as directed every day.        Meclizine HCl (Tab) ANTIVERT 25 MG Take 1 Tab by mouth 3 times a day as needed.        Omeprazole   Take  by mouth.        RaNITidine HCl (Tab) ZANTAC 150 MG Take 1 Tab by mouth every day.        SUMAtriptan Succinate (Tab) IMITREX 50 MG Take 1-2 Tabs by mouth Once PRN for Migraine for up to 1 dose.        TraMADol HCl (Tab) ULTRAM 50 MG TAKE ONE TABLET BY MOUTH EVERY 6 HOURS AS NEEDED FOR MIGRAINE PAIN        .                 Medicines prescribed today were sent to:     [facility] [address] [place] [address] (), [place] [address] () [place] [address]    Phone: [phone] Fax: [phone]    Open 24 Hours?: No      Medication refill instructions:       If your prescription bottle indicates you have medication refills left, it is not necessary to call your provider’s office. Please contact your pharmacy and they will refill your medication.    If your prescription bottle indicates you do not have any refills left, you may request refills at any time through one of the following ways: The online Social Median system (except Urgent Care), by calling your provider’s office, or by asking your pharmacy to contact your provider’s office with a refill request. Medication refills are processed only during regular business  hours and may not be available until the next business day. Your provider may request additional information or to have a follow-up visit with you prior to refilling your medication.   *Please Note: Medication refills are assigned a new Rx number when refilled electronically. Your pharmacy may indicate that no refills were authorized even though a new prescription for the same medication is available at the pharmacy. Please request the medicine by name with the pharmacy before contacting your provider for a refill.           AudioSnapst Access Code: Activation code not generated  Current GoInstant Status: Active           No new orders at this time, Lantus 5units administered as ordered. No

## 2021-12-01 ENCOUNTER — APPOINTMENT (OUTPATIENT)
Dept: NEPHROLOGY | Facility: CLINIC | Age: 61
End: 2021-12-01
Payer: MEDICAID

## 2021-12-01 VITALS
HEIGHT: 61 IN | HEART RATE: 97 BPM | OXYGEN SATURATION: 98 % | BODY MASS INDEX: 33.99 KG/M2 | DIASTOLIC BLOOD PRESSURE: 68 MMHG | SYSTOLIC BLOOD PRESSURE: 130 MMHG | WEIGHT: 180 LBS

## 2021-12-01 PROCEDURE — 99215 OFFICE O/P EST HI 40 MIN: CPT

## 2021-12-01 RX ORDER — MINOCYCLINE HYDROCHLORIDE 100 MG/1
100 CAPSULE ORAL TWICE DAILY
Qty: 20 | Refills: 0 | Status: DISCONTINUED | COMMUNITY
Start: 2021-08-26 | End: 2021-12-01

## 2021-12-01 NOTE — ASSESSMENT
[FreeTextEntry1] : 1) CKD III in the setting of HTN, DM, Gout, NSAID use with superimposed MALIKA from hypercalcemia\par Scr now stable at 1.0\par \par 2) Hypercalcemia\par 1-25(OH)2 vitamin D mediated - from Sarcoidosis \par Previously on high dose vitamin D which has been stopped. She is currently on prednisone taper -10 mg daily\par Sca++ slightly worse at 10.7; also she has had multiple rebounds of sarcoid with SHORT tapers;  prolonging therapy at this dose.\par Continue prednisone 5 mg daily; Lasix 20 mg qod  for hypercalciuric effect.\par will ty to taper off to 2.5 if calcium improves on next set of labs\par \par 3)  gout- stable \par uric acid levels high at 9.0\par allergic to allopurinol- ? try febuxostat\par Will sent to rheum for mgt\par \par \par 3) HTN\par BP stable; continue lasix and Enalapril\par \par \par 4) DM\par Continue Metformin\par \par \par Advised to f/u with Rheum\par RTC in 3 months\par \par \par \par \par \par \par

## 2021-12-01 NOTE — HISTORY OF PRESENT ILLNESS
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

## 2021-12-01 NOTE — PHYSICAL EXAM
[General Appearance - Alert] : alert [General Appearance - In No Acute Distress] : in no acute distress [Sclera] : the sclera and conjunctiva were normal [Strabismus] : no strabismus was seen [Outer Ear] : the ears and nose were normal in appearance [Hearing Threshold Finger Rub Not [place]] : hearing was normal [Neck Appearance] : the appearance of the neck was normal [] : no respiratory distress [Respiration, Rhythm And Depth] : normal respiratory rhythm and effort [Auscultation Breath Sounds / Voice Sounds] : lungs were clear to auscultation bilaterally [Heart Rate And Rhythm] : heart rate was normal and rhythm regular [Heart Sounds] : normal S1 and S2 [Heart Sounds Gallop] : no gallops [Edema] : there was no peripheral edema [Abdomen Soft] : soft [Abdomen Tenderness] : non-tender [No CVA Tenderness] : no ~M costovertebral angle tenderness [Abnormal Walk] : normal gait [No Focal Deficits] : no focal deficits [Oriented To Time, Place, And Person] : oriented to person, place, and time [FreeTextEntry1] : psoriatic rash on both legs+

## 2021-12-01 NOTE — REVIEW OF SYSTEMS
[Negative] : Respiratory [Fever] : no fever [Chills] : no chills [Feeling Tired] : not feeling tired [Eye Pain] : no eye pain [Red Eyes] : eyes not red [Earache] : no earache [Loss Of Hearing] : no hearing loss [Heart Rate Is Slow] : the heart rate was not slow [Heart Rate Is Fast] : the heart rate was not fast [Lower Ext Edema] : no lower extremity edema [Shortness Of Breath] : no shortness of breath [Wheezing] : no wheezing [Cough] : no cough [SOB on Exertion] : no shortness of breath during exertion [Abdominal Pain] : no abdominal pain [Constipation] : no constipation [Diarrhea] : no diarrhea [Dysuria] : no dysuria [Arthralgias] : no arthralgias [Confused] : no confusion [Convulsions] : no convulsions [Dizziness] : no dizziness [Suicidal] : not suicidal [Sleep Disturbances] : no sleep disturbances [Anxiety] : no anxiety [Muscle Weakness] : no muscle weakness [Easy Bleeding] : no tendency for easy bleeding [Easy Bruising] : no tendency for easy bruising [de-identified] : psoriasis; LE lesions

## 2021-12-02 LAB
ALBUMIN SERPL ELPH-MCNC: 4.3 G/DL
ANION GAP SERPL CALC-SCNC: 13 MMOL/L
BUN SERPL-MCNC: 23 MG/DL
CALCIUM SERPL-MCNC: 10.7 MG/DL
CHLORIDE SERPL-SCNC: 101 MMOL/L
CO2 SERPL-SCNC: 27 MMOL/L
CREAT SERPL-MCNC: 1 MG/DL
GLUCOSE SERPL-MCNC: 81 MG/DL
PHOSPHATE SERPL-MCNC: 3.2 MG/DL
POTASSIUM SERPL-SCNC: 5.1 MMOL/L
SODIUM SERPL-SCNC: 140 MMOL/L
URATE SERPL-MCNC: 9 MG/DL

## 2021-12-21 ENCOUNTER — APPOINTMENT (OUTPATIENT)
Dept: PULMONOLOGY | Facility: CLINIC | Age: 61
End: 2021-12-21

## 2021-12-29 ENCOUNTER — APPOINTMENT (OUTPATIENT)
Dept: GASTROENTEROLOGY | Facility: CLINIC | Age: 61
End: 2021-12-29

## 2022-03-01 ENCOUNTER — APPOINTMENT (OUTPATIENT)
Dept: PULMONOLOGY | Facility: CLINIC | Age: 62
End: 2022-03-01

## 2022-03-02 ENCOUNTER — APPOINTMENT (OUTPATIENT)
Dept: ENDOCRINOLOGY | Facility: CLINIC | Age: 62
End: 2022-03-02

## 2022-03-09 ENCOUNTER — APPOINTMENT (OUTPATIENT)
Dept: NEPHROLOGY | Facility: CLINIC | Age: 62
End: 2022-03-09

## 2022-03-23 ENCOUNTER — APPOINTMENT (OUTPATIENT)
Dept: PULMONOLOGY | Facility: CLINIC | Age: 62
End: 2022-03-23
Payer: MEDICAID

## 2022-03-23 VITALS — HEIGHT: 60.5 IN | WEIGHT: 178 LBS | BODY MASS INDEX: 34.04 KG/M2

## 2022-03-23 LAB
24R-OH-CALCIDIOL SERPL-MCNC: 23.8 PG/ML
25(OH)D3 SERPL-MCNC: 51.6 NG/ML
ACE BLD-CCNC: <5 U/L
ALBUMIN SERPL ELPH-MCNC: 4.1 G/DL
ALP BLD-CCNC: 42 U/L
ALT SERPL-CCNC: 16 U/L
ANION GAP SERPL CALC-SCNC: 15 MMOL/L
AST SERPL-CCNC: 11 U/L
BASOPHILS # BLD AUTO: 0.03 K/UL
BASOPHILS NFR BLD AUTO: 0.4 %
BILIRUB SERPL-MCNC: 0.2 MG/DL
BUN SERPL-MCNC: 42 MG/DL
CALCIUM SERPL-MCNC: 10.2 MG/DL
CHLORIDE SERPL-SCNC: 102 MMOL/L
CK SERPL-CCNC: 38 U/L
CO2 SERPL-SCNC: 25 MMOL/L
CREAT SERPL-MCNC: 1.38 MG/DL
CRP SERPL-MCNC: 1.57 MG/DL
EOSINOPHIL # BLD AUTO: 0.14 K/UL
EOSINOPHIL NFR BLD AUTO: 1.8 %
ERYTHROCYTE [SEDIMENTATION RATE] IN BLOOD BY WESTERGREN METHOD: 80 MM/HR
GLUCOSE SERPL-MCNC: 79 MG/DL
HCT VFR BLD CALC: 32 %
HGB BLD-MCNC: 9.5 G/DL
IMM GRANULOCYTES NFR BLD AUTO: 1.1 %
LYMPHOCYTES # BLD AUTO: 1.38 K/UL
LYMPHOCYTES NFR BLD AUTO: 18.1 %
MAGNESIUM SERPL-MCNC: 1.8 MG/DL
MAN DIFF?: NORMAL
MCHC RBC-ENTMCNC: 26.1 PG
MCHC RBC-ENTMCNC: 29.7 GM/DL
MCV RBC AUTO: 87.9 FL
MONOCYTES # BLD AUTO: 0.62 K/UL
MONOCYTES NFR BLD AUTO: 8.1 %
NEUTROPHILS # BLD AUTO: 5.36 K/UL
NEUTROPHILS NFR BLD AUTO: 70.5 %
PHOSPHATE SERPL-MCNC: 2.4 MG/DL
PLATELET # BLD AUTO: 231 K/UL
POTASSIUM SERPL-SCNC: 4.5 MMOL/L
PROT SERPL-MCNC: 6.8 G/DL
RBC # BLD: 3.64 M/UL
RBC # FLD: 17.1 %
SODIUM SERPL-SCNC: 142 MMOL/L
URATE SERPL-MCNC: 10.8 MG/DL
WBC # FLD AUTO: 7.61 K/UL

## 2022-03-23 PROCEDURE — 94727 GAS DIL/WSHOT DETER LNG VOL: CPT

## 2022-03-23 PROCEDURE — 94010 BREATHING CAPACITY TEST: CPT

## 2022-03-23 PROCEDURE — 85018 HEMOGLOBIN: CPT | Mod: QW

## 2022-03-23 PROCEDURE — 94729 DIFFUSING CAPACITY: CPT

## 2022-03-30 ENCOUNTER — APPOINTMENT (OUTPATIENT)
Dept: PULMONOLOGY | Facility: CLINIC | Age: 62
End: 2022-03-30
Payer: MEDICAID

## 2022-03-30 VITALS — HEART RATE: 84 BPM | OXYGEN SATURATION: 98 % | DIASTOLIC BLOOD PRESSURE: 80 MMHG | SYSTOLIC BLOOD PRESSURE: 126 MMHG

## 2022-03-30 VITALS — BODY MASS INDEX: 33.61 KG/M2 | WEIGHT: 175 LBS

## 2022-03-30 DIAGNOSIS — U07.1 COVID-19: ICD-10-CM

## 2022-03-30 PROCEDURE — 99214 OFFICE O/P EST MOD 30 MIN: CPT

## 2022-03-30 RX ORDER — ASPIRIN ENTERIC COATED TABLETS 81 MG 81 MG/1
81 TABLET, DELAYED RELEASE ORAL
Qty: 30 | Refills: 0 | Status: DISCONTINUED | COMMUNITY
Start: 2020-05-31 | End: 2022-03-30

## 2022-03-30 NOTE — PHYSICAL EXAM
[No Acute Distress] : no acute distress [Well Nourished] : well nourished [Well Developed] : well developed [Supple] : supple [Normal Rate/Rhythm] : normal rate/rhythm [Normal S1, S2] : normal s1, s2 [No Murmurs] : no murmurs [No Resp Distress] : no resp distress [No Acc Muscle Use] : no acc muscle use [Normal Rhythm and Effort] : normal rhythm and effort [Clear to Auscultation Bilaterally] : clear to auscultation bilaterally [No Abnormalities] : no abnormalities [Benign] : benign [Not Tender] : not tender [Soft] : soft [No Clubbing] : no clubbing [No Edema] : no edema [Oriented x3] : oriented x3 [No Focal Deficits] : no focal deficits [Normal Appearance] : normal appearance [TextBox_2] : Patient is obese

## 2022-03-30 NOTE — REVIEW OF SYSTEMS
[Diabetes] : diabetes [Negative] : Psychiatric [Thyroid Problem] : no thyroid problem [Back Pain] : no back pain

## 2022-03-30 NOTE — HISTORY OF PRESENT ILLNESS
[Excess Weight] : excess weight [Dyspnea] : dyspnea [Joint Pain] : joint pain [Low Calorie Diet] : low calorie diet [Fair Compliance] : fair compliance with treatment [Fair Tolerance] : fair tolerance of treatment [Diabetes] : diabetes [Hypertension] : hypertension [High] : high [Low Calorie] : low calorie [Well Balanced Diet] : well balanced meals [Infrequently] : exercises infrequently [Walking] : walking [Follow-Up - Routine Clinic] : a routine clinic follow-up of [Excessive Daytime Sleepiness] : excessive daytime sleepiness [Snoring] : snoring [Unrefreshing Sleep] : unrefreshing sleep [Sleepy When Sedentary] : sleepy when sedentary [Currently Experiencing] : The patient is currently experiencing symptoms. [None] : No associated symptoms are reported [CPAP] : CPAP [Poor Compliance] : poor compliance with treatment [Poor Tolerance] : poor tolerance of treatment [Poor Symptom Control] : poor symptom control [TextBox_4] : The patient recently suffered an ankle injury and was on crutches and a wheelchair but now is walking without support.\par She is followed by hematology for anemia.\par She has been on prednisone per nephrology for hypercalcemia.\par Pt dx'd with Covid 19 on 1/8/22 when she developed h/a, congested cough, nasal congestion. She did not require therapy and improved.  [Witnessed Apnea During Sleep] : no witnessed apnea during sleep [Witnessed Gasping During Sleep] : no witnessed gasping during sleep [de-identified] : at 6 cm of water

## 2022-03-30 NOTE — CONSULT LETTER
[Dear  ___] : Dear  [unfilled], [Consult Letter:] : I had the pleasure of evaluating your patient, [unfilled]. [Please see my note below.] : Please see my note below. [Consult Closing:] : Thank you very much for allowing me to participate in the care of this patient.  If you have any questions, please do not hesitate to contact me. [Sincerely,] : Sincerely, [DrYasamni  ___] : Dr. GASPAR [DrYasmani ___] : Dr. GASPAR [FreeTextEntry3] : Leland Sims MD, FCCP, D. ABSM\par Pulmonary and Sleep Medicine\par [facility] [place]\par \par

## 2022-03-30 NOTE — PROCEDURE
[FreeTextEntry1] : PFTs performed previously were normal.\par Spirometry subsequently was normal.\par PFTs 9/18/18 - normal\par PFTs 9/17/19 - normal and without significant change\par PFTs 8/20/20 - normal and without significant change\par PFTs 3/23/22 - normal

## 2022-03-30 NOTE — DISCUSSION/SUMMARY
[FreeTextEntry1] : \par #1. CPAP at 6 cm of water to treat moderate to severe WALTER; encouraged compliance in the past though pt is not compliant; Consider dental evaluation for an oral appliance as alternative therapy for treatment of WALTER though no one takes her insurance so have referred pt to the Dr. Access line; The patient understands the risks of untreated WALTER including heart disease, strokes, hypertension, pulmonary hypertension, and motor vehicle accidents as well as the need for treatment and weight loss. \par #2. Lung function testing have been normal; will repeat annually\par #3. Diet and exercise for weight loss\par #4. Repeat chest CT to f/u sarcoidosis as above; pt is on prednisone for hypercalcemia likely from sarcoidosis; repeat CT revealed stable changes; consider repeat CT in one year given current stability\par #5. F/u in 7 months with chest CT\par #6. Replace equipment as needed if she uses machine\par #7. SOBOE is likely related to weight or deconditioning given normal PFTs\par #8. The patient does not appear to require BD therapy at this time\par #9. Follow ACE and Ca levels per rheumatology; she is on prednisone for hypercalcemia thought to be due to her sarcoidosis per nephrology\par #10. S/p obesity medicine eval\par #11. Rheumatology and renal f/u\par #12. Consider repeat PSG/HST with weight loss given non-compliance with therapy for mod-sev WALTER\par #13. Reviewed risks of exposure and symptoms of Covid-19 virus, including how the virus is spread and precautions to avoid kerrie virus.\par \par The patient expressed understanding and agreement with the above recommendations/plan and accepts responsibility to be compliant with recommended testing, therapies, and f/u visits.\par All relevant questions and concerns were addressed.

## 2022-04-14 ENCOUNTER — OUTPATIENT (OUTPATIENT)
Dept: OUTPATIENT SERVICES | Facility: HOSPITAL | Age: 62
LOS: 1 days | End: 2022-04-14

## 2022-04-14 ENCOUNTER — APPOINTMENT (OUTPATIENT)
Dept: MAMMOGRAPHY | Facility: CLINIC | Age: 62
End: 2022-04-14

## 2022-04-14 DIAGNOSIS — Z00.00 ENCOUNTER FOR GENERAL ADULT MEDICAL EXAMINATION WITHOUT ABNORMAL FINDINGS: ICD-10-CM

## 2022-04-20 ENCOUNTER — APPOINTMENT (OUTPATIENT)
Dept: NEPHROLOGY | Facility: CLINIC | Age: 62
End: 2022-04-20
Payer: MEDICAID

## 2022-04-20 VITALS
DIASTOLIC BLOOD PRESSURE: 74 MMHG | HEART RATE: 96 BPM | OXYGEN SATURATION: 99 % | WEIGHT: 176 LBS | BODY MASS INDEX: 34.55 KG/M2 | HEIGHT: 60 IN | SYSTOLIC BLOOD PRESSURE: 122 MMHG

## 2022-04-20 PROCEDURE — 99214 OFFICE O/P EST MOD 30 MIN: CPT | Mod: 25

## 2022-04-20 NOTE — REVIEW OF SYSTEMS
[Negative] : Respiratory [Fever] : no fever [Chills] : no chills [Feeling Tired] : not feeling tired [Eye Pain] : no eye pain [Red Eyes] : eyes not red [Earache] : no earache [Loss Of Hearing] : no hearing loss [Heart Rate Is Slow] : the heart rate was not slow [Heart Rate Is Fast] : the heart rate was not fast [Lower Ext Edema] : no lower extremity edema [Shortness Of Breath] : no shortness of breath [Wheezing] : no wheezing [Cough] : no cough [SOB on Exertion] : no shortness of breath during exertion [Abdominal Pain] : no abdominal pain [Constipation] : no constipation [Diarrhea] : no diarrhea [Dysuria] : no dysuria [Arthralgias] : no arthralgias [Confused] : no confusion [Convulsions] : no convulsions [Dizziness] : no dizziness [Suicidal] : not suicidal [Sleep Disturbances] : no sleep disturbances [Anxiety] : no anxiety [Muscle Weakness] : no muscle weakness [Easy Bleeding] : no tendency for easy bleeding [Easy Bruising] : no tendency for easy bruising [de-identified] : psoriasis; LE lesions

## 2022-04-20 NOTE — PHYSICAL EXAM
[General Appearance - Alert] : alert [General Appearance - In No Acute Distress] : in no acute distress [Sclera] : the sclera and conjunctiva were normal [Strabismus] : no strabismus was seen [Outer Ear] : the ears and nose were normal in appearance [Hearing Threshold Finger Rub Not [place]] : hearing was normal [Neck Appearance] : the appearance of the neck was normal [] : no respiratory distress [Respiration, Rhythm And Depth] : normal respiratory rhythm and effort [Auscultation Breath Sounds / Voice Sounds] : lungs were clear to auscultation bilaterally [Heart Rate And Rhythm] : heart rate was normal and rhythm regular [Heart Sounds] : normal S1 and S2 [Heart Sounds Gallop] : no gallops [Edema] : there was no peripheral edema [Abdomen Soft] : soft [Abdomen Tenderness] : non-tender [No CVA Tenderness] : no ~M costovertebral angle tenderness [Abnormal Walk] : normal gait [No Focal Deficits] : no focal deficits [Oriented To Time, Place, And Person] : oriented to person, place, and time [FreeTextEntry1] : psoriatic rash on both legs+

## 2022-04-20 NOTE — HISTORY OF PRESENT ILLNESS
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

## 2022-04-20 NOTE — ASSESSMENT
[FreeTextEntry1] : 1) CKD III in the setting of HTN, DM, Gout, NSAID use with superimposed MALIKA from hypercalcemia\par Scr now 1.3- stable\par \par 2) Hypercalcemia\par 1-25(OH)2 vitamin D mediated - from Sarcoidosis \par Previously on high dose vitamin D which has been stopped. She is currently on prednisone taper -10 mg daily\par Sca++ slightly worse at 10.7; also she has had multiple rebounds of sarcoid with SHORT tapers;  prolonging therapy at this dose.\par Currently on prednisone 2.5 mg daily; Lasix 20 mg qod  for hypercalciuric effect.\par Repeat BMP today\par \par 3)  gout- stable \par uric acid levels high at 9.0\par allergic to allopurinol- ? try febuxostat\par Will sent to rheum for mgt\par \par \par 3) HTN\par BP stable; continue lasix and Enalapril\par \par \par 4) DM\par Continue Metformin\par \par \par Advised to f/u with Rheum\par RTC in 3 months\par \par \par \par \par \par \par

## 2022-06-30 LAB
ALBUMIN SERPL ELPH-MCNC: 4.3 G/DL
ANION GAP SERPL CALC-SCNC: 12 MMOL/L
APPEARANCE: CLEAR
BACTERIA: NEGATIVE
BILIRUBIN URINE: NEGATIVE
BLOOD URINE: NEGATIVE
BUN SERPL-MCNC: 30 MG/DL
CALCIUM SERPL-MCNC: 11.3 MG/DL
CHLORIDE SERPL-SCNC: 101 MMOL/L
CO2 SERPL-SCNC: 27 MMOL/L
COLOR: COLORLESS
CREAT SERPL-MCNC: 1.25 MG/DL
CREAT SPEC-SCNC: 21 MG/DL
CREAT/PROT UR: NORMAL RATIO
EGFR: 48 ML/MIN/1.73M2
GLUCOSE QUALITATIVE U: NEGATIVE
GLUCOSE SERPL-MCNC: 100 MG/DL
HYALINE CASTS: 0 /LPF
KETONES URINE: NEGATIVE
LEUKOCYTE ESTERASE URINE: NEGATIVE
MICROSCOPIC-UA: NORMAL
NITRITE URINE: NEGATIVE
PH URINE: 6
PHOSPHATE SERPL-MCNC: 3.3 MG/DL
POTASSIUM SERPL-SCNC: 4.5 MMOL/L
PROT UR-MCNC: <4 MG/DL
PROTEIN URINE: NEGATIVE
RED BLOOD CELLS URINE: 0 /HPF
SODIUM SERPL-SCNC: 140 MMOL/L
SPECIFIC GRAVITY URINE: 1.01
SQUAMOUS EPITHELIAL CELLS: 0 /HPF
UROBILINOGEN URINE: NORMAL
WHITE BLOOD CELLS URINE: 0 /HPF

## 2022-07-15 ENCOUNTER — EMERGENCY (EMERGENCY)
Facility: HOSPITAL | Age: 62
LOS: 1 days | Discharge: HOME | End: 2022-07-15
Attending: EMERGENCY MEDICINE
Payer: COMMERCIAL

## 2022-07-15 VITALS
DIASTOLIC BLOOD PRESSURE: 76 MMHG | SYSTOLIC BLOOD PRESSURE: 137 MMHG | RESPIRATION RATE: 18 BRPM | TEMPERATURE: 98 F | HEART RATE: 71 BPM | OXYGEN SATURATION: 97 %

## 2022-07-15 VITALS
TEMPERATURE: 98 F | WEIGHT: 175.05 LBS | SYSTOLIC BLOOD PRESSURE: 151 MMHG | HEART RATE: 108 BPM | RESPIRATION RATE: 18 BRPM | HEIGHT: 62 IN | OXYGEN SATURATION: 97 % | DIASTOLIC BLOOD PRESSURE: 77 MMHG

## 2022-07-15 LAB
ALBUMIN SERPL ELPH-MCNC: 4.2 G/DL — SIGNIFICANT CHANGE UP (ref 3.3–5.2)
ALP SERPL-CCNC: 45 U/L — SIGNIFICANT CHANGE UP (ref 40–120)
ALT FLD-CCNC: 12 U/L — SIGNIFICANT CHANGE UP
ANION GAP SERPL CALC-SCNC: 14 MMOL/L — SIGNIFICANT CHANGE UP (ref 5–17)
APPEARANCE UR: CLEAR — SIGNIFICANT CHANGE UP
AST SERPL-CCNC: 19 U/L — SIGNIFICANT CHANGE UP
BACTERIA # UR AUTO: ABNORMAL
BASE EXCESS BLDV CALC-SCNC: 1.8 MMOL/L — SIGNIFICANT CHANGE UP (ref -2–3)
BASOPHILS # BLD AUTO: 0.03 K/UL — SIGNIFICANT CHANGE UP (ref 0–0.2)
BASOPHILS NFR BLD AUTO: 0.5 % — SIGNIFICANT CHANGE UP (ref 0–2)
BILIRUB SERPL-MCNC: 0.3 MG/DL — LOW (ref 0.4–2)
BILIRUB UR-MCNC: NEGATIVE — SIGNIFICANT CHANGE UP
BUN SERPL-MCNC: 41.5 MG/DL — HIGH (ref 8–20)
CA-I SERPL-SCNC: 1.33 MMOL/L — SIGNIFICANT CHANGE UP (ref 1.15–1.33)
CALCIUM SERPL-MCNC: 10.7 MG/DL — HIGH (ref 8.6–10.2)
CHLORIDE BLDV-SCNC: 102 MMOL/L — SIGNIFICANT CHANGE UP (ref 98–107)
CHLORIDE SERPL-SCNC: 100 MMOL/L — SIGNIFICANT CHANGE UP (ref 98–107)
CO2 SERPL-SCNC: 23 MMOL/L — SIGNIFICANT CHANGE UP (ref 22–29)
COLOR SPEC: YELLOW — SIGNIFICANT CHANGE UP
CREAT SERPL-MCNC: 1.57 MG/DL — HIGH (ref 0.5–1.3)
DIFF PNL FLD: NEGATIVE — SIGNIFICANT CHANGE UP
EGFR: 37 ML/MIN/1.73M2 — LOW
EOSINOPHIL # BLD AUTO: 0.14 K/UL — SIGNIFICANT CHANGE UP (ref 0–0.5)
EOSINOPHIL NFR BLD AUTO: 2.1 % — SIGNIFICANT CHANGE UP (ref 0–6)
EPI CELLS # UR: SIGNIFICANT CHANGE UP
GAS PNL BLDV: 135 MMOL/L — LOW (ref 136–145)
GAS PNL BLDV: SIGNIFICANT CHANGE UP
GAS PNL BLDV: SIGNIFICANT CHANGE UP
GLUCOSE BLDV-MCNC: 80 MG/DL — SIGNIFICANT CHANGE UP (ref 70–99)
GLUCOSE SERPL-MCNC: 87 MG/DL — SIGNIFICANT CHANGE UP (ref 70–99)
GLUCOSE UR QL: NEGATIVE MG/DL — SIGNIFICANT CHANGE UP
HCO3 BLDV-SCNC: 27 MMOL/L — SIGNIFICANT CHANGE UP (ref 22–29)
HCT VFR BLD CALC: 36.7 % — SIGNIFICANT CHANGE UP (ref 34.5–45)
HCT VFR BLDA CALC: 36 % — SIGNIFICANT CHANGE UP
HGB BLD CALC-MCNC: 12.1 G/DL — SIGNIFICANT CHANGE UP (ref 11.7–16.1)
HGB BLD-MCNC: 11.5 G/DL — SIGNIFICANT CHANGE UP (ref 11.5–15.5)
IMM GRANULOCYTES NFR BLD AUTO: 0.3 % — SIGNIFICANT CHANGE UP (ref 0–1.5)
KETONES UR-MCNC: NEGATIVE — SIGNIFICANT CHANGE UP
LACTATE BLDV-MCNC: 1 MMOL/L — SIGNIFICANT CHANGE UP (ref 0.5–2)
LEUKOCYTE ESTERASE UR-ACNC: ABNORMAL
LIDOCAIN IGE QN: 68 U/L — HIGH (ref 22–51)
LYMPHOCYTES # BLD AUTO: 0.98 K/UL — LOW (ref 1–3.3)
LYMPHOCYTES # BLD AUTO: 14.8 % — SIGNIFICANT CHANGE UP (ref 13–44)
MAGNESIUM SERPL-MCNC: 1.9 MG/DL — SIGNIFICANT CHANGE UP (ref 1.6–2.6)
MCHC RBC-ENTMCNC: 25.6 PG — LOW (ref 27–34)
MCHC RBC-ENTMCNC: 31.3 GM/DL — LOW (ref 32–36)
MCV RBC AUTO: 81.6 FL — SIGNIFICANT CHANGE UP (ref 80–100)
MONOCYTES # BLD AUTO: 0.59 K/UL — SIGNIFICANT CHANGE UP (ref 0–0.9)
MONOCYTES NFR BLD AUTO: 8.9 % — SIGNIFICANT CHANGE UP (ref 2–14)
NEUTROPHILS # BLD AUTO: 4.87 K/UL — SIGNIFICANT CHANGE UP (ref 1.8–7.4)
NEUTROPHILS NFR BLD AUTO: 73.4 % — SIGNIFICANT CHANGE UP (ref 43–77)
NITRITE UR-MCNC: NEGATIVE — SIGNIFICANT CHANGE UP
PCO2 BLDV: 43 MMHG — HIGH (ref 39–42)
PH BLDV: 7.4 — SIGNIFICANT CHANGE UP (ref 7.32–7.43)
PH UR: 6 — SIGNIFICANT CHANGE UP (ref 5–8)
PLATELET # BLD AUTO: 239 K/UL — SIGNIFICANT CHANGE UP (ref 150–400)
PO2 BLDV: 102 MMHG — HIGH (ref 25–45)
POTASSIUM BLDV-SCNC: 4.2 MMOL/L — SIGNIFICANT CHANGE UP (ref 3.5–5.1)
POTASSIUM SERPL-MCNC: 4.1 MMOL/L — SIGNIFICANT CHANGE UP (ref 3.5–5.3)
POTASSIUM SERPL-SCNC: 4.1 MMOL/L — SIGNIFICANT CHANGE UP (ref 3.5–5.3)
PROT SERPL-MCNC: 7.9 G/DL — SIGNIFICANT CHANGE UP (ref 6.6–8.7)
PROT UR-MCNC: 15
RAPID RVP RESULT: SIGNIFICANT CHANGE UP
RBC # BLD: 4.5 M/UL — SIGNIFICANT CHANGE UP (ref 3.8–5.2)
RBC # FLD: 15.1 % — HIGH (ref 10.3–14.5)
RBC CASTS # UR COMP ASSIST: SIGNIFICANT CHANGE UP /HPF (ref 0–4)
SAO2 % BLDV: 99.1 % — SIGNIFICANT CHANGE UP
SARS-COV-2 RNA SPEC QL NAA+PROBE: SIGNIFICANT CHANGE UP
SODIUM SERPL-SCNC: 137 MMOL/L — SIGNIFICANT CHANGE UP (ref 135–145)
SP GR SPEC: 1.01 — SIGNIFICANT CHANGE UP (ref 1.01–1.02)
TROPONIN T SERPL-MCNC: <0.01 NG/ML — SIGNIFICANT CHANGE UP (ref 0–0.06)
UROBILINOGEN FLD QL: NEGATIVE MG/DL — SIGNIFICANT CHANGE UP
WBC # BLD: 6.63 K/UL — SIGNIFICANT CHANGE UP (ref 3.8–10.5)
WBC # FLD AUTO: 6.63 K/UL — SIGNIFICANT CHANGE UP (ref 3.8–10.5)
WBC UR QL: SIGNIFICANT CHANGE UP /HPF (ref 0–5)

## 2022-07-15 PROCEDURE — 71045 X-RAY EXAM CHEST 1 VIEW: CPT | Mod: 26

## 2022-07-15 PROCEDURE — 85025 COMPLETE CBC W/AUTO DIFF WBC: CPT

## 2022-07-15 PROCEDURE — 93010 ELECTROCARDIOGRAM REPORT: CPT

## 2022-07-15 PROCEDURE — 71045 X-RAY EXAM CHEST 1 VIEW: CPT

## 2022-07-15 PROCEDURE — 96366 THER/PROPH/DIAG IV INF ADDON: CPT

## 2022-07-15 PROCEDURE — 93005 ELECTROCARDIOGRAM TRACING: CPT

## 2022-07-15 PROCEDURE — 80053 COMPREHEN METABOLIC PANEL: CPT

## 2022-07-15 PROCEDURE — 82803 BLOOD GASES ANY COMBINATION: CPT

## 2022-07-15 PROCEDURE — 85014 HEMATOCRIT: CPT

## 2022-07-15 PROCEDURE — 96375 TX/PRO/DX INJ NEW DRUG ADDON: CPT

## 2022-07-15 PROCEDURE — 83605 ASSAY OF LACTIC ACID: CPT

## 2022-07-15 PROCEDURE — 82947 ASSAY GLUCOSE BLOOD QUANT: CPT

## 2022-07-15 PROCEDURE — 0225U NFCT DS DNA&RNA 21 SARSCOV2: CPT

## 2022-07-15 PROCEDURE — 81001 URINALYSIS AUTO W/SCOPE: CPT

## 2022-07-15 PROCEDURE — 36415 COLL VENOUS BLD VENIPUNCTURE: CPT

## 2022-07-15 PROCEDURE — 82962 GLUCOSE BLOOD TEST: CPT

## 2022-07-15 PROCEDURE — 83735 ASSAY OF MAGNESIUM: CPT

## 2022-07-15 PROCEDURE — 99285 EMERGENCY DEPT VISIT HI MDM: CPT | Mod: 25

## 2022-07-15 PROCEDURE — 74176 CT ABD & PELVIS W/O CONTRAST: CPT | Mod: MA

## 2022-07-15 PROCEDURE — 85018 HEMOGLOBIN: CPT

## 2022-07-15 PROCEDURE — 87086 URINE CULTURE/COLONY COUNT: CPT

## 2022-07-15 PROCEDURE — 99285 EMERGENCY DEPT VISIT HI MDM: CPT

## 2022-07-15 PROCEDURE — 84484 ASSAY OF TROPONIN QUANT: CPT

## 2022-07-15 PROCEDURE — 84132 ASSAY OF SERUM POTASSIUM: CPT

## 2022-07-15 PROCEDURE — 83690 ASSAY OF LIPASE: CPT

## 2022-07-15 PROCEDURE — 82330 ASSAY OF CALCIUM: CPT

## 2022-07-15 PROCEDURE — 84295 ASSAY OF SERUM SODIUM: CPT

## 2022-07-15 PROCEDURE — 82435 ASSAY OF BLOOD CHLORIDE: CPT

## 2022-07-15 PROCEDURE — 96365 THER/PROPH/DIAG IV INF INIT: CPT

## 2022-07-15 PROCEDURE — 74176 CT ABD & PELVIS W/O CONTRAST: CPT | Mod: 26,MA

## 2022-07-15 RX ORDER — MORPHINE SULFATE 50 MG/1
2 CAPSULE, EXTENDED RELEASE ORAL ONCE
Refills: 0 | Status: DISCONTINUED | OUTPATIENT
Start: 2022-07-15 | End: 2022-07-15

## 2022-07-15 RX ORDER — SODIUM CHLORIDE 9 MG/ML
500 INJECTION INTRAMUSCULAR; INTRAVENOUS; SUBCUTANEOUS ONCE
Refills: 0 | Status: COMPLETED | OUTPATIENT
Start: 2022-07-15 | End: 2022-07-15

## 2022-07-15 RX ORDER — ACETAMINOPHEN 500 MG
1000 TABLET ORAL ONCE
Refills: 0 | Status: COMPLETED | OUTPATIENT
Start: 2022-07-15 | End: 2022-07-15

## 2022-07-15 RX ADMIN — Medication 400 MILLIGRAM(S): at 17:07

## 2022-07-15 RX ADMIN — MORPHINE SULFATE 2 MILLIGRAM(S): 50 CAPSULE, EXTENDED RELEASE ORAL at 19:21

## 2022-07-15 RX ADMIN — SODIUM CHLORIDE 500 MILLILITER(S): 9 INJECTION INTRAMUSCULAR; INTRAVENOUS; SUBCUTANEOUS at 17:07

## 2022-07-15 RX ADMIN — Medication 1000 MILLIGRAM(S): at 19:21

## 2022-07-15 RX ADMIN — MORPHINE SULFATE 2 MILLIGRAM(S): 50 CAPSULE, EXTENDED RELEASE ORAL at 17:07

## 2022-07-15 NOTE — ED PROVIDER NOTE - PROGRESS NOTE DETAILS
patient labs essentially wnl, ct showed multiple pulmonary nodules and abdominal lymphadenopathy. will refer to Thoracic, GYN, and oncology for further eval.

## 2022-07-15 NOTE — ED PROVIDER NOTE - CLINICAL SUMMARY MEDICAL DECISION MAKING FREE TEXT BOX
Patient is a 63 yo female with PMHx CKD, hypercalcemia, gout, anemia, DM2 on metformin, psoriasis, HTN, sarcoidosis, kidney stones presenting with 2-3 days of polyuria, polydipsia, thirst, and pelvic pain. Patient states for 2-3 days she has had increased urinary frequency, thirst, polydipsia and had an episode of lightheadedness today where she felt like passing out at which point patient came to the ED. VSS, resting comfortably, oropharynx clear, lungs ctab, belly soft nontender, no pelvic ttp, no cva ttp, no peripheral edema. Will hydrate, control pain, check labs to r/o MALIKA, UA with Ucx to r/o UTI, lipase to r/o pancreatitis, ecg troponin to r/o acs, CT renal stone hunt to r/o nephrolithiasis. Will continue to monitor.

## 2022-07-15 NOTE — ED ADULT NURSE REASSESSMENT NOTE - NS ED NURSE REASSESS COMMENT FT1
Assumed care at 1940, received report from Dalia, pt a&ox4, respirations even and unlabored, VSS, no distress noted, awaiting CT results.

## 2022-07-15 NOTE — ED STATDOCS - NSICDXPASTMEDICALHX_GEN_ALL_CORE_FT
PAST MEDICAL HISTORY:  DM (diabetes mellitus)     HTN (hypertension)     Psoriasis     Sarcoidosis

## 2022-07-15 NOTE — ED ADULT TRIAGE NOTE - CHIEF COMPLAINT QUOTE
pt c/o pain and difficulty on urination, called MD and was told to come to ED. denies any blood in urine.

## 2022-07-15 NOTE — ED STATDOCS - PROGRESS NOTE DETAILS
61 y/o female with PMHx of psoriasis, DM, HTN and sarcoidosis presents with painful urination and frequent urination for the past 3 days. Associated pain with BM and lower abdominal pain. CAT scan to r/o diverticulitis.  told 2 days ago calcium was very high. 63 y/o female with PMHx of psoriasis, DM, HTN and sarcoidosis presents with painful urination and frequent urination for the past 3 days. Associated pain with BM and lower abdominal pain. Send to [facility] for CAT scan to r/o diverticulitis.  told 2 days ago calcium was very high.

## 2022-07-15 NOTE — ED PROVIDER NOTE - NSFOLLOWUPINSTRUCTIONS_ED_ALL_ED_FT
VAYA AL DOCTOR PRIMARIO.  STEWART TOMAGRAFIA MUESTRA Nódulo pulmonar y Linfadenopatía.    NECESITA IR A EL ESPECIALISTA DE PULMONES, GYNOCOLOGIA, Y ONCOLOGIA.      Nódulo pulmonar    Pulmonary Nodule    Un nódulo pulmonar es un bulto [place] y pequeño en el tejido del pulmón. En ocasiones, se lo conoce jere ana m eben o un punto en el pulmón. Los nódulos pueden variar en tamaño y la mayoría miden menos de 1/2 pulgada (10 mm). Los nódulos de más de 1.2 pulgadas (3 cm) se denominan masas pulmonares. A veces, los nódulos pulmonares se detectan alfonzo ana m radiografía de rutina o cuando se realizan otras pruebas de diagnóstico por imágenes para detectar otros problemas.    Los nódulos pulmonares pueden ser no cancerosos (benignos) o cancerosos (malignos). En stewart mayoría, son no cancerosos. Es más probable que los nódulos más pequeños en las personas que no fuman y que no tienen otros factores de riesgo de cáncer de pulmón carolyn no cancerosos. Es más probable que los nódulos más grandes e irregulares en las personas que fuman o que tienen sólidos antecedentes familiares de cáncer de pulmón carolyn cancerosos.    ¿Cuáles son las causas?  Esta afección puede ser causada por lo siguiente:  •Ana M infección bacteriana, fúngica o viral, jere la tuberculosis. La infección suele ser anterior y estar inactiva.  •Tejido canceroso, jere cáncer de pulmón o cáncer en otra parte del cuerpo que se ha diseminado hasta el pulmón.  •Ana M masa de tejido no cancerosa.  •Inflamación por enfermedades jere la artritis reumatoide.  •Vasos sangíneos anormales en los pulmones.    ¿Cuáles son los signos o síntomas?    Por lo general esta afección no presenta síntomas. Si aparecen síntomas, suelen estar relacionados con la causa preexistente. Por ejemplo, si la afección es provocada por ana m infección, puede presentar tos o fiebre.    ¿Cómo se diagnostica?  Esta afección se suele diagnosticar mediante ana m radiografía o ana m exploración por tomografía computarizada (TC). Para ayudar a determinar si un nódulo pulmonar es whitney o maligno, el médico hará lo siguiente:  •Realizar ana m historia clínica.  •Realizar un examen físico.    •Indicar exámenes. Pueden incluir:  •Radiografías de tórax.  •Ana M exploración por tomografía computarizada (TC). Xiomara estudio muestra los nódulos pulmonares más pequeños con más claridad y con más detalle que ana m radiografía.  •Ana M tomografía por emisión de positrones (PET). Xiomara estudio se realiza para verificar si un nódulo es canceroso. Alfonzo xiomara estudio, se inyecta ana m cantidad pequeña de sustancia radioactiva en el torrente sanguíneo. Luego, se osmin ana m imagen.  •Biopsia para evaluar si es cáncer o confirmar un diagnóstico. En xiomara procedimiento, se extrae ana m muestra de tejido del nódulo insertando ana m aguja a través del pecho, usando un endoscopio que se coloca en el pulmón, o mediante ana m cirugía abierta.  •Ana M prueba que se realiza en la piel llamada prueba de tuberculina. Esta prueba se realiza para determinar si ha estado expuesto a la bacteria que causa la tuberculosis.  •Análisis de yvonne.    ¿Cómo se trata?    El tratamiento de esta afección depende de si el nódulo pulmonar es maligno o whitney. También depende de stewart riesgo de padecer cáncer.    Por lo general, los nódulos no cancerosos no necesitan tratamiento, noemi es posible que deban ser controlados con exploraciones por tomografía computarizada (TC). Si en ana m TC se observa que el nódulo pulmonar ha crecido, es posible que se realicen más estudios.    Los nódulos que se comprueba que son cancerosos después de ana m biopsia requieren tratamiento dependiendo del tipo y el estadio del cáncer. Necesitará más pruebas de diagnóstico, jere exploraciones por TC y PET, para determinar el estadio del cáncer. Los tratamientos para el cáncer pueden incluir los siguientes:  •Cirugía.  •Radioterapia.  •Quimioterapia.  •Inmunoterapia.    Es posible que algunos nódulos deban extirparse. Si necesita que se extirpe un nódulo, es posible que se le realice un procedimiento llamado toracotomía. Alfonzo el procedimiento, el médico le realizará ana m incisión en el pecho y le extirpará la parte del pulmón donde está ubicado el nódulo.    Siga estas instrucciones en stewart casa:  •Use los medicamentos de venta adam y los recetados solamente jere se lo haya indicado el médico.  • No consuma ningún producto que contenga nicotina o tabaco. Estos productos incluyen cigarrillos, tabaco para mascar y aparatos de vapeo, jere los cigarrillos electrónicos. Si necesita ayuda para dejar de consumir estos productos, consulte al médico.  •Cumpla con todas las visitas de seguimiento. [place] es importante.    Comuníquese con un médico si:  •Tiene dolor en el pecho, la espalda o el hombro.  •Siente que le falta el aire o tiene dificultad para respirar cuando está activo.  •Comienza a tener tos o ronquera por ana m razón inexplicable.  •Se siente muy cansado o enfermo.  •No tiene ganas de comer o pierde peso sin intentarlo.  •Siente escalofríos, fiebre o sudores nocturnos.  •Necesita dos o más almohadas para dormir por la noche.  •Tiene alguno de estos problemas:  •Fiebre y síntomas que empeoran repentinamente.  •Fiebre o síntomas persistentes alfonzo más de 2 a 3 días.    Solicite ayuda de inmediato si:  •No puede respirar normalmente.  •Siente súbitamente un dolor en el pecho.  •Empieza a hacer sonidos de silbidos agudos cuando respira, más a menudo cuando exhala (sibilancia).  •No puede dejar de toser, o tose yvonne o mucosidad sanguinolenta de los pulmones (esputo).  •Se siente mareado o siente jere si fuera a desmayarse.    Estos síntomas pueden representar un problema grave que constituye ana m emergencia. No espere a yovani si los síntomas desaparecen. Solicite atención médica de inmediato. Comuníquese con el servicio de emergencias de stewart localidad (911 en los [place]). No conduzca por quentin propios medios hasta el hospital.     Resumen  •Un nódulo pulmonar es un bulto [place] y pequeño en el tejido del pulmón. En stewart mayoría, los nódulos pulmonares no son cancerosos.  •Las causas frecuentes de los nódulos pulmonares incluyen infección, inflamación y crecimientos no cancerosos.  •Esta afección se suele diagnosticar mediante ana m radiografía o ana m exploración por tomografía computarizada (TC).  •El tratamiento de esta afección depende de si el nódulo pulmonar es maligno o whitney. También depende de stewart riesgo de padecer cáncer.  •Si se determina que un nódulo es canceroso, requerirá pruebas de diagnóstico y opciones de tratamiento específicas según las indicaciones del médico.        Linfadenopatía    Lymphadenopathy       El término linfadenopatía significa que los ganglios linfáticos están inflamados o son más grandes que lo normal. Los ganglios linfáticos, también llamados nódulos linfáticos, son grupos de tejido que filtran el exceso de líquido, bacterias, virus y sustancias de desecho del torrente sanguíneo. [place] parte del sistema de defensa de enfermedades del cuerpo (sistema inmunitario) que protege al cuerpo contra los gérmenes.    La linfadenopatía puede tener diferentes causas, dependiendo del lugar del cuerpo en donde se encuentra. Algunos tipos desaparecen por sí solos. La linfadenopatía puede producirse en cualquier lugar que tenga ganglios linfáticos, incluidas las siguientes áreas:  •Rudi (linfadenopatía cervical).      •Pecho (linfadenopatía mediastinal).      •Pulmones (linfadenopatía hiliar).      •Axilas (linfadenopatía axilar).      •Sindhu (linfadenopatía inguinal).      Cuando el sistema inmunitario responde a los microbios, se produce ana m acumulación de líquido y células en los ganglios linfáticos. [place] produce hinchazón y agrandamiento. Si los ganglios linfáticos no vuelven a stewart tamaño normal después de netta tenido ana m infección o enfermedad, el médico puede realizar algunas pruebas. Estas pruebas ayudarán a controlar la enfermedad y determinar el motivo por el cual los ganglios aún están hinchados y agrandados.      Siga estas instrucciones en stewart casa:     •Descanse mucho.      •El médico puede recomendarle medicamentos de venta adam para el dolor. Use los medicamentos de venta adam y los recetados solamente jere se lo haya indicado el médico.    •Si se lo indican, aplique calor en la eneida de los ganglios linfáticos con la frecuencia que le haya indicado el médico. Use la angelic de calor que el médico le recomiende, jere ana m compresa de calor húmedo o ana m almohadilla térmica.  •Coloque ana m toalla entre la piel y la angelic de calor.       •Aplique calor alfonzo 20 a 30 minutos.       •Retire la angelic de calor si la piel se pone de color brown brillante. [place] es especialmente importante si no puede sentir dolor, calor o frío. Puede correr un mayor riesgo de sufrir quemaduras.      •Controle los ganglios linfáticos afectados todos los días para detectar cambios. Controle otras áreas de ganglios linfáticos jere se lo haya indicado el médico. Controle si presenta cambios jere:  •Aumento de la hinchazón.      •Súbito aumento del tamaño.      •Enrojecimiento o dolor.      •Dureza.        •Concurra a todas las visitas de seguimiento. [place] es importante.        Comuníquese con un médico si tiene:  •Los ganglios linfáticos:  •Todavía están hinchados después de 2 semanas.      •Se duggan agrandado repentinamente o la hinchazón se ha extendido.      •Están rojos o duros, o le duelen.        •Líquido que supura de la piel cerca de un ganglio linfático agrandado.      •Problemas para respirar.      •Fiebre, escalofríos o sudores nocturnos.      •Fatiga.      •Dolor de garganta.      •Dolor en el abdomen.      •Pérdida de peso.        Solicite ayuda de inmediato si tiene:    •Dolor intenso.      •Dolor de pecho.      •Falta de aire.      Estos síntomas pueden representar un problema grave que constituye ana m emergencia. No espere a yovani si los síntomas desaparecen. Solicite atención médica de inmediato. Comuníquese con el servicio de emergencias de stewart localidad (911 en los [place]). No conduzca por quentin propios medios hasta el hospital.       Resumen    •El término linfadenopatía significa que los ganglios linfáticos están inflamados o son más grandes que lo normal.      •Los ganglios linfáticos, también llamados nódulos linfáticos, son grupos de tejido que filtran el exceso de líquido, bacterias, virus y sustancias de desecho del torrente sanguíneo. [place] parte del sistema del cuerpo que combate las enfermedades (sistema inmunitario).      •La linfadenopatía puede producirse en cualquier lugar que tenga ganglios linfáticos.      •Si los ganglios linfáticos no vuelven a stewart tamaño normal después de ana m infección o ana m enfermedad, el médico puede hacerle pruebas para controlar stewart afección y averiguar el motivo por el cual los ganglios aún están hinchados y agrandados.      •Controle los ganglios linfáticos afectados todos los días para detectar cambios. Controle otras áreas de ganglios linfáticos jere se lo haya indicado el médico.

## 2022-07-15 NOTE — ED PROVIDER NOTE - PHYSICAL EXAMINATION
Constitutional: Well appearing, awake, alert, oriented to person, place, and time/situation and in no apparent distress  ENMT: Airway patent nasal mucosa clear. Mouth with normal mucosa. Throat has no vesicles no oropharyngeal exudates and uvula is midline. No blood in the oropharynx  EYES: clear bilaterally, pupils equal, round and reactive to light  Cardiac: Tachycardic, regular rhythm. Heart sounds S1, S2. No murmurs, rubs or gallops. Good capillary refill, 2+ pulses, no peripheral edema  Respiratory: Lungs CTAB, no use of accessory muscles, no crackles, satting 99% on RA in no distress  Gastrointestinal: Abdomen nondistended, non-tender, no rebound guarding or peritoneal signs  Genitourinary: No CVA tenderness, pelvis nontender, bladder nondistended  Musculoskeletal: Spine appears normal, range of motion is not limited, no muscle or joint tenderness  Neurological: Alert and oriented, no focal deficits, no motor or sensory deficits. CN 2-12 intact, PERRLA, EOMI, No FND, moving all extremities equally, sensation intact, No dysmetria, no ataxia, negative heel-shin, 5/5 strength   Skin: Skin normal color for race, warm, dry and intact, No evidence of rash

## 2022-07-15 NOTE — ED ADULT NURSE NOTE - NSICDXPASTMEDICALHX_GEN_ALL_CORE_FT
PAST MEDICAL HISTORY:  DM (diabetes mellitus)     HTN (hypertension)     Psoriasis     Sarcoidosis

## 2022-07-15 NOTE — ED PROVIDER NOTE - PATIENT PORTAL LINK FT
You can access the FollowMyHealth Patient Portal offered by [facility] by registering at the following website: [email or web address]. By joining [facility]’s FollowMyHealth portal, you will also be able to view your health information using other applications (apps) compatible with our system.

## 2022-07-15 NOTE — ED PROVIDER NOTE - CARE PROVIDER_API CALL
Tee Cheatham)  Thoracic Surgery  [address] [place] [id number]  Phone: [phone]  Fax: [phone]  Follow Up Time: 1-3 Days

## 2022-07-15 NOTE — ED PROVIDER NOTE - CARE PLAN
1 Principal Discharge DX:	Abdominal pain  Secondary Diagnosis:	Multiple pulmonary nodules  Secondary Diagnosis:	Abdominal lymphadenopathy

## 2022-07-15 NOTE — ED PROVIDER NOTE - ATTENDING CONTRIBUTION TO CARE
Casey... 62yoF; with PMH signif for DM, HTN, HLD, Psoriasis, Sarcoidosis; now p/w abd pain--suprapubic, cramping, x2 days, associated with nausea, polydipsia, polyuria.  denies dysuria, frequency, urgency. denies cp/sob/palp. denies vomiting. denies f/c/s. denies cough. denies back pain. denies dizziness. c/o generalized weakness.  General:     NAD  Head:     NC/AT, EOMI, oral mucosa moist  Neck:     trachea midline  Lungs:     CTA b/l, no w/r/r  CVS:     S1S2, RRR, no m/g/r  Abd:     +BS, TTP @ epigastrium, no rebound or guarding, s/nd, no organomegaly  Ext:    2+ radial and pedal pulses, no c/c/e  Neuro: AAOx3, no sensory/motor deficits  A/P:  62yoF p/w abd pain, nausea, polydipsia, polyuria  -labs, ct abd/pel, ivf, re-eval.

## 2022-07-15 NOTE — ED PROVIDER NOTE - NSICDXPASTMEDICALHX_GEN_ALL_CORE_FT
PAST MEDICAL HISTORY:  DM (diabetes mellitus)     HTN (hypertension)     Psoriasis     Sarcoidosis        PAST MEDICAL HISTORY:  DM (diabetes mellitus)     HTN (hypertension)     Psoriasis     Sarcoidosis

## 2022-07-15 NOTE — ED PROVIDER NOTE - IV ALTEPLASE DOOR HIDDEN
.        Internal Medicine History & Physical                                                                  Patient Name: Ignacio Elizondo    YOB: 1952    MRN: [id number]         Date of Service: 3/19/2022    Subjective     Source: patient and EMR     Preferred Language: english    PCP: No Pcp    Chief Complaint:   Chief Complaint   Patient presents with   • Chest Pain Adult     HPI:  Ignacio Elizondo is a 70 year old male PMH of HTM, DM presenting to [facility] ED for chest pain. Interview conductor with use of . Patient was sitting down this AM when he felt a stabbing substernal chest pain radiating to his LUE and left jaw. Pain was associated with short lives SOB. Patient denies diaphoresis, nausea or vomiting. He has never felt pain or pressure like this before.He was given nitroglycerin and aspirin via EMS that resolved his chest pain. He has never felt pain like this before and denies every having any cardiac work up done. Denies every having pictures of his heart taken, or having undergone any sort of stress test including treadmill stress test. Patient does not regularly see doctors, but has never been told he has any heart or kidney disease. He was prescribed medications for high blood pressure and and metformin in the recent past few years. Denies use of aspirin or statin.     Past Medical History  Diabetes- on metformin 500mg BID  Hypertension- on losartan 50mg qdaily    Surgical History   None    Social History  Has been smoking for 40 years. Currently smokes 1-2 cigarettes a day, but has smoked up to 5 cigarettes a day in the past. Gave up alcohol use days ago. Lives in [place] and is currently visiting family here.     ED Course  Upon presentation patient was hypertensive to the 180's-200's/ 60-90's. Labs were notable for troponin of 309 and creatinine of 1.18. EKG demonstrating a fib with slow ventricular response, T wave abnormality suggestive of lateral ischemia. He was given  show nitroglycerin and aspirin en route in ambulance.     Review of Systems:   Constitutional: Denies fever, chills, sweats   HEENT: Denies recent vision changes  Cardiovascular: Endorses chest pain prior to admission. Denies CP, palpitations, peripheral edema  Respiratory: Denies SOB  GI: Denies N/V/D/C, abdominal pain  : Denies dysuria  Neuro: Denies headache     Past Medical History:   Diagnosis Date   • Diabetes mellitus (CMS/HCC)    • Essential (primary) hypertension      (Not in a hospital admission)    ALLERGIES:  No Known Allergies   History reviewed. No pertinent surgical history.  History reviewed. No pertinent family history.       Objective     Vitals:    03/19/22 1450   BP: (!) 183/65   Pulse: (!) 54   Resp: 18   Temp:        Physical Exam  General: AOx4, NAD, appears stated age, resting in bed   HEENT: normocephalic, atraumatic, EOMI, PERRL, MMM   Neck: supple, nontender, no LAD   CV: RRR, +S1 +S2, no murmurs/rubs/gallops. Carotid, radial, and dorsalis pedis pulses +2/4 b/l. No JVD   Lungs: CTAB, no crackles/rhonchi/wheezing   Abdomen: soft, nontender, nondistended, BS present, tympanic to percussion. No rigidity, rebound or guarding.   Extremities: no peripheral edema, clubbing, or cyanosis   Neuro: CN 2-12 grossly intact   Skin: warm, dry, intact    No intake or output data in the 24 hours ending 03/19/22 1457     Recent Results (from the past 24 hour(s))   Comprehensive Metabolic Panel    Collection Time: 03/19/22 12:41 PM   Result Value Ref Range    Fasting Status      Sodium 139 135 - 145 mmol/L    Potassium 4.8 3.4 - 5.1 mmol/L    Chloride 109 (H) 98 - 107 mmol/L    Carbon Dioxide 22 21 - 32 mmol/L    Anion Gap 13 10 - 20 mmol/L    Glucose 165 (H) 70 - 99 mg/dL    BUN 22 (H) 6 - 20 mg/dL    Creatinine 1.18 (H) 0.67 - 1.17 mg/dL    Glomerular Filtration Rate 62 >=60    BUN/ Creatinine Ratio 19 7 - 25    Calcium 9.2 8.4 - 10.2 mg/dL    Bilirubin, Total 0.6 0.2 - 1.0 mg/dL    GOT/AST 20 <=37 Units/L     GPT/ALT 20 <64 Units/L    Alkaline Phosphatase 90 45 - 117 Units/L    Albumin 3.5 (L) 3.6 - 5.1 g/dL    Protein, Total 7.0 6.4 - 8.2 g/dL    Globulin 3.5 2.0 - 4.0 g/dL    A/G Ratio 1.0 1.0 - 2.4   TROPONIN I, HIGH SENSITIVITY    Collection Time: 03/19/22 12:41 PM   Result Value Ref Range    Troponin I, High Sensitivity 309 (HH) <77 ng/L   CBC with Automated Differential (performable only)    Collection Time: 03/19/22 12:41 PM   Result Value Ref Range    WBC 7.2 4.2 - 11.0 K/mcL    RBC 5.00 4.50 - 5.90 mil/mcL    HGB 15.0 13.0 - 17.0 g/dL    HCT 46.1 39.0 - 51.0 %    MCV 92.2 78.0 - 100.0 fl    MCH 30.0 26.0 - 34.0 pg    MCHC 32.5 32.0 - 36.5 g/dL    RDW-CV 13.0 11.0 - 15.0 %    RDW-SD 44.1 39.0 - 50.0 fL     (L) 140 - 450 K/mcL    NRBC 0 <=0 /100 WBC    Neutrophil, Percent 58 %    Lymphocytes, Percent 31 %    Mono, Percent 9 %    Eosinophils, Percent 1 %    Basophils, Percent 1 %    Immature Granulocytes 0 %    Absolute Neutrophils 4.2 1.8 - 7.7 K/mcL    Absolute Lymphocytes 2.2 1.0 - 4.0 K/mcL    Absolute Monocytes 0.7 0.3 - 0.9 K/mcL    Absolute Eosinophils  0.1 0.0 - 0.5 K/mcL    Absolute Basophils 0.0 0.0 - 0.3 K/mcL    Absolute Immmature Granulocytes 0.0 0.0 - 0.2 K/mcL   NT proBNP    Collection Time: 03/19/22 12:41 PM   Result Value Ref Range    NT-proBNP 2,758 (H) <=125 pg/mL   Lipase    Collection Time: 03/19/22 12:41 PM   Result Value Ref Range    Lipase 234 73 - 393 Units/L   Electrocardiogram 12-Lead    Collection Time: 03/19/22 12:45 PM   Result Value Ref Range    Ventricular Rate EKG/Min (BPM) 55     Atrial Rate (BPM) 59     QRS-Interval (MSEC) 82     QT-Interval (MSEC) 428     QTc 409     R Axis (Degrees) 37     T Axis (Degrees) 124     REPORT TEXT       Atrial fibrillation  with slow ventricular response  T wave abnormality, consider lateral ischemia  Abnormal ECG  No previous ECGs available  Confirmed by SEGUNDO PETERSEN MD ([id number]) on 3/19/2022 1:42:30 PM     Rapid SARS-CoV-2 by PCR    Collection  Time: 03/19/22  1:23 PM    Specimen: Nasal, Mid-turbinate; Swab   Result Value Ref Range    Rapid SARS-COV-2 by PCR Not Detected Not Detected / Detected / Presumptive Positive / Inhibitors present    Isolation Guidelines      Procedural Comment         No current facility-administered medications for this encounter.     No current outpatient medications on file.          Assessment and Plan       Problem List Items Addressed This Visit        Cardiac and Vasculature    * (Principal) NSTEMI (non-ST elevated myocardial infarction) (CMS/[facility]) - Primary        NSTEMI  One episode of unstable angina associated with pain radiating to left jaw and arm  JACOB score of 3  S/p nitroglycerin and aspirin in EMS  Plan  Heparin gtt  Nitroglycerin 0.4mg PRN q5  Atorvastatin 40mg qdailiy  Aspirin 81mg qdaily  Pending lipid panel and TSH  Cardiology on consult, appreciate rec's  Potential Cath Monday  Trend troponins till peak and pleateau    New Onset Atrial Fibrillation  - No prior history of known afib  - EKG demonstrating afib  - HR in the 50's  Plan  - IMDUR 30mg qdailiy  - Echo pending    History of Essential Hypertension  - Home regimen of Losartan 50mg  Plan  - Holding home losartan in setting of BENY    BENY on CKD  - Creatinine elevated to 1.18 on admission. Unclear what patient's baseline is  Plan  - Avoid nephrotoxic agents    Diabetes Mellitus  - Home Regimen of Metformin 500mg BID  Plan  - LDSS  - A1c Pending  - BS goals of 140-180    Tobacco Abuse  ~20 pack year smoking history  Plan  - Discussed smoking cessation  FENA  Fluids:   Electrolytes: replace PRN  Nutrition: Cardiac Diet  Activity: advance to baseline    PPx:  VTE: heparin gtt  GI: not indicated    Code Status: FULL CODE    Dispo: [place]    Bhavani Morgan MD  PGY-1

## 2022-07-16 ENCOUNTER — APPOINTMENT (OUTPATIENT)
Dept: MAMMOGRAPHY | Facility: CLINIC | Age: 62
End: 2022-07-16

## 2022-07-16 ENCOUNTER — OUTPATIENT (OUTPATIENT)
Dept: OUTPATIENT SERVICES | Facility: HOSPITAL | Age: 62
LOS: 1 days | End: 2022-07-16
Payer: MEDICAID

## 2022-07-16 DIAGNOSIS — Z00.8 ENCOUNTER FOR OTHER GENERAL EXAMINATION: ICD-10-CM

## 2022-07-16 PROCEDURE — 77067 SCR MAMMO BI INCL CAD: CPT

## 2022-07-16 PROCEDURE — 77063 BREAST TOMOSYNTHESIS BI: CPT

## 2022-07-16 PROCEDURE — 77067 SCR MAMMO BI INCL CAD: CPT | Mod: 26

## 2022-07-16 PROCEDURE — 77063 BREAST TOMOSYNTHESIS BI: CPT | Mod: 26

## 2022-07-17 LAB
CULTURE RESULTS: SIGNIFICANT CHANGE UP
SPECIMEN SOURCE: SIGNIFICANT CHANGE UP

## 2022-07-18 PROBLEM — Z00.00 ENCOUNTER FOR PREVENTIVE HEALTH EXAMINATION: Status: ACTIVE | Noted: 2022-07-18

## 2022-07-20 ENCOUNTER — APPOINTMENT (OUTPATIENT)
Dept: NEPHROLOGY | Facility: CLINIC | Age: 62
End: 2022-07-20

## 2022-07-20 VITALS
SYSTOLIC BLOOD PRESSURE: 118 MMHG | WEIGHT: 172 LBS | BODY MASS INDEX: 33.77 KG/M2 | DIASTOLIC BLOOD PRESSURE: 58 MMHG | HEART RATE: 92 BPM | OXYGEN SATURATION: 96 % | HEIGHT: 60 IN

## 2022-07-20 PROCEDURE — 36415 COLL VENOUS BLD VENIPUNCTURE: CPT

## 2022-07-20 PROCEDURE — 99214 OFFICE O/P EST MOD 30 MIN: CPT | Mod: 25

## 2022-07-20 NOTE — REVIEW OF SYSTEMS
[Negative] : Heme/Lymph [Fever] : no fever [Chills] : no chills [Feeling Tired] : not feeling tired [Eye Pain] : no eye pain [Red Eyes] : eyes not red [Earache] : no earache [Loss Of Hearing] : no hearing loss [Heart Rate Is Slow] : the heart rate was not slow [Heart Rate Is Fast] : the heart rate was not fast [Lower Ext Edema] : no lower extremity edema [Shortness Of Breath] : no shortness of breath [Wheezing] : no wheezing [Cough] : no cough [SOB on Exertion] : no shortness of breath during exertion [Abdominal Pain] : no abdominal pain [Constipation] : no constipation [Diarrhea] : no diarrhea [Dysuria] : no dysuria [Arthralgias] : no arthralgias [Confused] : no confusion [Convulsions] : no convulsions [Dizziness] : no dizziness [Suicidal] : not suicidal [Sleep Disturbances] : no sleep disturbances [Anxiety] : no anxiety [Muscle Weakness] : no muscle weakness [Easy Bleeding] : no tendency for easy bleeding [Easy Bruising] : no tendency for easy bruising [de-identified] : psoriasis; LE lesions

## 2022-07-20 NOTE — ASSESSMENT
[FreeTextEntry1] : 1) CKD III in the setting of HTN, DM, Gout, NSAID use with superimposed MALIKA from hypercalcemia\par Scr now 1.25 - stable\par \par 2) Hypercalcemia\par 1-25(OH)2 vitamin D mediated - from Sarcoidosis \par Previously on high dose vitamin D which has been stopped. She is currently on prednisone taper \par Sca++ slightly worse at 11.3; also she has had multiple rebounds of sarcoid with SHORT tapers;  prolonging therapy at this dose.\par Currently on prednisone 2.5 mg daily; Lasix 20 mg qod  for hypercalciuric effect.\par Repeat CKD labs today\par \par 3)  gout- stable \par uric acid levels high at 9.0\par allergic to allopurinol- ? try febuxostat\par Sent to rheum for mgt\par \par \par 3) HTN\par BP stable; continue lasix and Enalapril\par \par \par 4) DM\par Continue Metformin\par \par Medications reconciled\par Labs drawn in office today\par \par Advised to f/u with Rheum\par \par RTC in 3 months\par \par \par \par \par \par \par

## 2022-07-20 NOTE — HISTORY OF PRESENT ILLNESS
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 ED on 7/15/22 for abdominal pain, LUQ pain for which she was told is r/t her sarcoidosis and referred to rhematology for follow-up.\par \par Pt denies dysuria, hematuria, foamy urine or flank pain as well as no alterations in voiding pattern or volume.\par \par \par

## 2022-07-20 NOTE — PHYSICAL EXAM
[General Appearance - Alert] : alert [General Appearance - In No Acute Distress] : in no acute distress [General Appearance - Well Developed] : well developed [General Appearance - Well Nourished] : well nourished [General Appearance - Well-Appearing] : healthy appearing [Strabismus] : no strabismus was seen [Outer Ear] : the ears and nose were normal in appearance [Hearing Threshold Finger Rub Not [place]] : hearing was normal [Neck Appearance] : the appearance of the neck was normal [Neck Cervical Mass (___cm)] : no neck mass was observed [Jugular Venous Distention Increased] : there was no jugular-venous distention [] : no respiratory distress [Respiration, Rhythm And Depth] : normal respiratory rhythm and effort [Auscultation Breath Sounds / Voice Sounds] : lungs were clear to auscultation bilaterally [Exaggerated Use Of Accessory Muscles For Inspiration] : no accessory muscle use [Apical Impulse] : the apical impulse was normal [Heart Rate And Rhythm] : heart rate was normal and rhythm regular [Heart Sounds] : normal S1 and S2 [Edema] : there was no peripheral edema [Abdomen Soft] : soft [Abdomen Tenderness] : non-tender [No CVA Tenderness] : no ~M costovertebral angle tenderness [Abnormal Walk] : normal gait [Involuntary Movements] : no involuntary movements were seen [Skin Color & Pigmentation] : normal skin color and pigmentation [Lower Extremities] : on the lower extremities [No Focal Deficits] : no focal deficits [Oriented To Time, Place, And Person] : oriented to person, place, and time [Impaired Insight] : insight and judgment were intact [Affect] : the affect was normal [Mood] : the mood was normal [FreeTextEntry1] : psoriatic

## 2022-07-22 ENCOUNTER — NON-APPOINTMENT (OUTPATIENT)
Age: 62
End: 2022-07-22

## 2022-07-22 LAB
25(OH)D3 SERPL-MCNC: 26.8 NG/ML
ALBUMIN SERPL ELPH-MCNC: 4.3 G/DL
ANION GAP SERPL CALC-SCNC: 13 MMOL/L
APPEARANCE: CLEAR
BACTERIA: NEGATIVE
BASOPHILS # BLD AUTO: 0.03 K/UL
BASOPHILS NFR BLD AUTO: 0.6 %
BILIRUBIN URINE: NEGATIVE
BLOOD URINE: NEGATIVE
BUN SERPL-MCNC: 36 MG/DL
CALCIUM SERPL-MCNC: 11.1 MG/DL
CALCIUM SERPL-MCNC: 11.1 MG/DL
CHLORIDE SERPL-SCNC: 102 MMOL/L
CO2 SERPL-SCNC: 24 MMOL/L
COLOR: NORMAL
CREAT SERPL-MCNC: 1.47 MG/DL
CREAT SPEC-SCNC: 70 MG/DL
CREAT/PROT UR: 0.1 RATIO
EGFR: 40 ML/MIN/1.73M2
EOSINOPHIL # BLD AUTO: 0.08 K/UL
EOSINOPHIL NFR BLD AUTO: 1.6 %
ESTIMATED AVERAGE GLUCOSE: 131 MG/DL
GLUCOSE QUALITATIVE U: NEGATIVE
GLUCOSE SERPL-MCNC: 81 MG/DL
HBA1C MFR BLD HPLC: 6.2 %
HCT VFR BLD CALC: 36.9 %
HGB BLD-MCNC: 11.2 G/DL
HYALINE CASTS: 1 /LPF
IMM GRANULOCYTES NFR BLD AUTO: 0.2 %
KETONES URINE: NEGATIVE
LEUKOCYTE ESTERASE URINE: NEGATIVE
LYMPHOCYTES # BLD AUTO: 0.97 K/UL
LYMPHOCYTES NFR BLD AUTO: 19.1 %
MAN DIFF?: NORMAL
MCHC RBC-ENTMCNC: 25.7 PG
MCHC RBC-ENTMCNC: 30.4 GM/DL
MCV RBC AUTO: 84.6 FL
MICROSCOPIC-UA: NORMAL
MONOCYTES # BLD AUTO: 0.36 K/UL
MONOCYTES NFR BLD AUTO: 7.1 %
NEUTROPHILS # BLD AUTO: 3.63 K/UL
NEUTROPHILS NFR BLD AUTO: 71.4 %
NITRITE URINE: NEGATIVE
PARATHYROID HORMONE INTACT: 10 PG/ML
PH URINE: 6
PHOSPHATE SERPL-MCNC: 3.4 MG/DL
PLATELET # BLD AUTO: 253 K/UL
POTASSIUM SERPL-SCNC: 5 MMOL/L
PROT UR-MCNC: 9 MG/DL
PROTEIN URINE: NEGATIVE
RBC # BLD: 4.36 M/UL
RBC # FLD: 16.5 %
RED BLOOD CELLS URINE: 2 /HPF
SODIUM SERPL-SCNC: 139 MMOL/L
SPECIFIC GRAVITY URINE: 1.02
SQUAMOUS EPITHELIAL CELLS: 1 /HPF
UROBILINOGEN URINE: NORMAL
WBC # FLD AUTO: 5.08 K/UL
WHITE BLOOD CELLS URINE: 1 /HPF

## 2022-07-25 RX ORDER — TOBRAMYCIN AND DEXAMETHASONE 3; 1 MG/ML; MG/ML
0.3-0.1 SUSPENSION/ DROPS OPHTHALMIC
Qty: 2 | Refills: 0 | Status: DISCONTINUED | COMMUNITY
Start: 2022-05-16

## 2022-07-26 ENCOUNTER — APPOINTMENT (OUTPATIENT)
Dept: THORACIC SURGERY | Facility: CLINIC | Age: 62
End: 2022-07-26

## 2022-07-26 VITALS
BODY MASS INDEX: 34.16 KG/M2 | WEIGHT: 174.02 LBS | SYSTOLIC BLOOD PRESSURE: 123 MMHG | HEIGHT: 60 IN | HEART RATE: 90 BPM | DIASTOLIC BLOOD PRESSURE: 80 MMHG | OXYGEN SATURATION: 96 %

## 2022-07-26 PROCEDURE — 99204 OFFICE O/P NEW MOD 45 MIN: CPT

## 2022-07-26 NOTE — ASSESSMENT
[FreeTextEntry1] : Siobhan is a 62-year-old female with a history of sarcoidosis recently seen in the emergency room with abdominal pain found to have lung nodules in addition to abdominal adenopathy.  This appears consistent with her known diagnosis of sarcoidosis.  I have recommended she follow-up with pulmonary medicine and rheumatology for further recommendations regarding treatment.\par \par Thank you for allowing me to participate in the care of your patient.\par \par 45 minutes was spent during this encounter.\par \par Chidi Maza MD\par Department of Cardiovascular and Thoracic Surgery\par \par [employer]\[facility] [employer] [facility]\par

## 2022-07-26 NOTE — PHYSICAL EXAM
[] : no respiratory distress [Auscultation Breath Sounds / Voice Sounds] : lungs were clear to auscultation bilaterally [Heart Rate And Rhythm] : heart rate was normal and rhythm regular [Heart Sounds] : normal S1 and S2 [Heart Sounds Gallop] : no gallops [Murmurs] : no murmurs [Heart Sounds Pericardial Friction Rub] : no pericardial rub [Examination Of The Chest] : the chest was normal in appearance [Chest Visual Inspection Thoracic Asymmetry] : no chest asymmetry [Diminished Respiratory Excursion] : normal chest expansion [Oriented To Time, Place, And Person] : oriented to person, place, and time [Impaired Insight] : insight and judgment were intact [Affect] : the affect was normal

## 2022-07-26 NOTE — DATA REVIEWED
[FreeTextEntry1] : CT Chest/Abd\par 7/15/22\par \par Impression:\par \par 1. Nonobstructive bilateral renal calculi.\par 2. There are innumerable nodules identified wihtin the visualized portion of the lung parenchyma, increased in size and number from prior. Right hilar enlargement may represent right hilar lymphadenopathy.\par 3. Bilateral pelvic and inguinal lymphadenopathy and portacaval lymphadenopathy.

## 2022-07-26 NOTE — HISTORY OF PRESENT ILLNESS
[FreeTextEntry1] : Ms. RADHA BRAND is a 62 year old female referred by [facility]  who presents for consultation regarding multiple pulmonary nodules and abdominal lymphadenopathy. \par \par Her pertinent past medical history includes CKD, hypercalcemia, gout, anemia, diabetes, psoriasis, HTN, sarcoidosis, kidney stones\par

## 2022-08-17 ENCOUNTER — APPOINTMENT (OUTPATIENT)
Dept: PULMONOLOGY | Facility: CLINIC | Age: 62
End: 2022-08-17

## 2022-08-17 VITALS
OXYGEN SATURATION: 97 % | DIASTOLIC BLOOD PRESSURE: 74 MMHG | SYSTOLIC BLOOD PRESSURE: 132 MMHG | WEIGHT: 170 LBS | HEART RATE: 102 BPM | HEIGHT: 60 IN | RESPIRATION RATE: 16 BRPM | BODY MASS INDEX: 33.38 KG/M2

## 2022-08-17 PROCEDURE — 99214 OFFICE O/P EST MOD 30 MIN: CPT

## 2022-08-17 RX ORDER — CEFDINIR 300 MG/1
300 CAPSULE ORAL
Qty: 20 | Refills: 0 | Status: DISCONTINUED | COMMUNITY
Start: 2022-08-03

## 2022-08-17 NOTE — REVIEW OF SYSTEMS
[Diabetes] : diabetes [Negative] : Psychiatric [Back Pain] : no back pain [Thyroid Problem] : no thyroid problem

## 2022-08-17 NOTE — CONSULT LETTER
[Dear  ___] : Dear  [unfilled], [Consult Letter:] : I had the pleasure of evaluating your patient, [unfilled]. [Please see my note below.] : Please see my note below. [Consult Closing:] : Thank you very much for allowing me to participate in the care of this patient.  If you have any questions, please do not hesitate to contact me. [Sincerely,] : Sincerely, [DrYasmani  ___] : Dr. GASPAR [DrYasmani ___] : Dr. GASPAR [FreeTextEntry3] : Leland Sims MD, FCCP, D. ABSM\par Pulmonary and Sleep Medicine\par [facility] [place]\par \par

## 2022-08-17 NOTE — DISCUSSION/SUMMARY
[FreeTextEntry1] : \par #1. CPAP at 6 cm of water to treat moderate to severe WALTER; encouraged compliance in the past though pt is not compliant; Consider dental evaluation for an oral appliance as alternative therapy for treatment of WALTER though no one takes her insurance so have referred pt to the Dr. Access line; The patient understands the risks of untreated WALTER including heart disease, strokes, hypertension, pulmonary hypertension, and motor vehicle accidents as well as the need for treatment and weight loss. \par #2. Lung function testing have been normal; will repeat annually\par #3. Diet and exercise for weight loss\par #4. Chest CT to f/u sarcoidosis has been stable though pt has been on prednisone for hypercalcemia likely from sarcoidosis; Abd CT and now PET CT reveal increase in pulmonary nodules and HI with increased uptake; consider repeat LN bx to confirm dx of sarcoidosis vs possible lymphoma\par #6. Pt to obtain rheumatology evaluation for possible therapy if needed given pt with normal PFTs and no new respiratory symptoms despite increase in nodules and HI\par #7. SOBOE is likely related to weight or deconditioning given normal PFTs\par #8. The patient does not appear to require BD therapy at this time\par #9. Follow ACE and Ca levels per rheumatology; she is on prednisone for hypercalcemia thought to be due to her sarcoidosis per nephrology\par #10. S/p obesity medicine eval\par #11. Rheumatology and renal f/u\par #12. Consider repeat PSG/HST with weight loss given non-compliance with therapy for mod-sev WALTER\par #13. F/u in 3 months\par #14. Reviewed risks of exposure and symptoms of Covid-19 virus, including how the virus is spread and precautions to avoid kerrie virus.\par \par The patient expressed understanding and agreement with the above recommendations/plan and accepts responsibility to be compliant with recommended testing, therapies, and f/u visits.\par All relevant questions and concerns were addressed.

## 2022-08-17 NOTE — HISTORY OF PRESENT ILLNESS
[Excess Weight] : excess weight [Dyspnea] : dyspnea [Joint Pain] : joint pain [Low Calorie Diet] : low calorie diet [Fair Compliance] : fair compliance with treatment [Fair Tolerance] : fair tolerance of treatment [Diabetes] : diabetes [Hypertension] : hypertension [High] : high [Low Calorie] : low calorie [Well Balanced Diet] : well balanced meals [Infrequently] : exercises infrequently [Walking] : walking [Follow-Up - Routine Clinic] : a routine clinic follow-up of [Excessive Daytime Sleepiness] : excessive daytime sleepiness [Snoring] : snoring [Unrefreshing Sleep] : unrefreshing sleep [Sleepy When Sedentary] : sleepy when sedentary [Currently Experiencing] : The patient is currently experiencing symptoms. [None] : No associated symptoms are reported [CPAP] : CPAP [Poor Compliance] : poor compliance with treatment [Poor Tolerance] : poor tolerance of treatment [Poor Symptom Control] : poor symptom control [TextBox_4] : The patient recently suffered an ankle injury and was on crutches and a wheelchair but now is walking without support.\par Pt reports a prior lymph node bx at [facility] and was dx'd with sarcoidosis.\par She is followed by hematology for anemia.\par She has been on prednisone per nephrology for hypercalcemia.\par Pt dx'd with Covid 19 on 1/8/22 when she developed h/a, congested cough, nasal congestion. She did not require therapy and improved.  [Witnessed Apnea During Sleep] : no witnessed apnea during sleep [Witnessed Gasping During Sleep] : no witnessed gasping during sleep [de-identified] : at 6 cm of water

## 2022-08-17 NOTE — PHYSICAL EXAM
[No Acute Distress] : no acute distress [Well Nourished] : well nourished [Well Developed] : well developed [Normal Appearance] : normal appearance [Supple] : supple [Normal Rate/Rhythm] : normal rate/rhythm [Normal S1, S2] : normal s1, s2 [No Murmurs] : no murmurs [No Resp Distress] : no resp distress [No Acc Muscle Use] : no acc muscle use [Normal Rhythm and Effort] : normal rhythm and effort [Clear to Auscultation Bilaterally] : clear to auscultation bilaterally [No Abnormalities] : no abnormalities [Benign] : benign [Not Tender] : not tender [Soft] : soft [No Clubbing] : no clubbing [No Edema] : no edema [No Focal Deficits] : no focal deficits [Oriented x3] : oriented x3 [TextBox_2] : Patient is obese

## 2022-08-18 ENCOUNTER — APPOINTMENT (OUTPATIENT)
Dept: RHEUMATOLOGY | Facility: CLINIC | Age: 62
End: 2022-08-18

## 2022-08-18 VITALS
HEIGHT: 60 IN | BODY MASS INDEX: 33.77 KG/M2 | TEMPERATURE: 98 F | WEIGHT: 172 LBS | OXYGEN SATURATION: 97 % | SYSTOLIC BLOOD PRESSURE: 130 MMHG | HEART RATE: 83 BPM | RESPIRATION RATE: 18 BRPM | DIASTOLIC BLOOD PRESSURE: 79 MMHG

## 2022-08-18 DIAGNOSIS — L40.9 PSORIASIS, UNSPECIFIED: ICD-10-CM

## 2022-08-18 PROCEDURE — 99204 OFFICE O/P NEW MOD 45 MIN: CPT

## 2022-08-18 NOTE — HISTORY OF PRESENT ILLNESS
[FreeTextEntry1] : 63 y/o female w/ DM2, HTN, CKD stage 3, WALTER on CPAP, gout, and sarcoidosis referred to rheumatology for sarcoidosis (with pulm nodules), and gout.\par Pt dx with gout scine 2015. Pt had allergy to allopurinol with rashes in the past and was stopped. Pt was on probenecid daily but stopped due to CKD. Uloric avoided due to Hx of CAD/MI. Pt restarted on probenecid by hem/onc about 1 week ago. Pt has not had gout attacks (knees, ankles, feet) for >1 year with dietary changes. Pt has Hx of kidney stones. Pt had psoriasis all over the body, now only have it in legs - treated with steroid creams, PUVA in past.\par Pt has sarcoidosis with pulmonary nodules and hypercalcemia treated with steroid tapers. Pt's PFTs have been normal and she has been on low dose PDN 2.5mg/day. No extra-pulmonary manifestations including uveitis, rash, kidney involvement. Pt is followed by ophthalmology and being treated for glaucoma.\par

## 2022-10-05 ENCOUNTER — APPOINTMENT (OUTPATIENT)
Dept: NEPHROLOGY | Facility: CLINIC | Age: 62
End: 2022-10-05

## 2022-10-05 VITALS
OXYGEN SATURATION: 98 % | SYSTOLIC BLOOD PRESSURE: 102 MMHG | HEART RATE: 103 BPM | HEIGHT: 60 IN | WEIGHT: 173.5 LBS | BODY MASS INDEX: 34.06 KG/M2 | DIASTOLIC BLOOD PRESSURE: 60 MMHG

## 2022-10-05 DIAGNOSIS — Z01.818 ENCOUNTER FOR OTHER PREPROCEDURAL EXAMINATION: ICD-10-CM

## 2022-10-05 PROCEDURE — 99215 OFFICE O/P EST HI 40 MIN: CPT

## 2022-10-05 NOTE — PHYSICAL EXAM
[General Appearance - Alert] : alert [General Appearance - In No Acute Distress] : in no acute distress [Sclera] : the sclera and conjunctiva were normal [Strabismus] : no strabismus was seen [Outer Ear] : the ears and nose were normal in appearance [Hearing Threshold Finger Rub Not [place]] : hearing was normal [Neck Appearance] : the appearance of the neck was normal [] : no respiratory distress [Respiration, Rhythm And Depth] : normal respiratory rhythm and effort [Auscultation Breath Sounds / Voice Sounds] : lungs were clear to auscultation bilaterally [Heart Rate And Rhythm] : heart rate was normal and rhythm regular [Heart Sounds] : normal S1 and S2 [Heart Sounds Gallop] : no gallops [Edema] : there was no peripheral edema [Abdomen Soft] : soft [Abdomen Tenderness] : non-tender [No CVA Tenderness] : no ~M costovertebral angle tenderness [Abnormal Walk] : normal gait [No Focal Deficits] : no focal deficits [Oriented To Time, Place, And Person] : oriented to person, place, and time [FreeTextEntry1] : psoriatic rash on both legs+

## 2022-10-05 NOTE — HISTORY OF PRESENT ILLNESS
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

## 2022-10-05 NOTE — REVIEW OF SYSTEMS
[Negative] : Respiratory [Fever] : no fever [Chills] : no chills [Feeling Tired] : not feeling tired [Eye Pain] : no eye pain [Red Eyes] : eyes not red [Earache] : no earache [Loss Of Hearing] : no hearing loss [Heart Rate Is Slow] : the heart rate was not slow [Heart Rate Is Fast] : the heart rate was not fast [Lower Ext Edema] : no lower extremity edema [Shortness Of Breath] : no shortness of breath [Wheezing] : no wheezing [Cough] : no cough [SOB on Exertion] : no shortness of breath during exertion [Abdominal Pain] : no abdominal pain [Constipation] : no constipation [Diarrhea] : no diarrhea [Dysuria] : no dysuria [Arthralgias] : no arthralgias [Confused] : no confusion [Convulsions] : no convulsions [Dizziness] : no dizziness [Suicidal] : not suicidal [Sleep Disturbances] : no sleep disturbances [Anxiety] : no anxiety [Muscle Weakness] : no muscle weakness [Easy Bleeding] : no tendency for easy bleeding [Easy Bruising] : no tendency for easy bruising [de-identified] : psoriasis; LE lesions

## 2022-10-05 NOTE — ASSESSMENT
[FreeTextEntry1] : CKD III in the setting of HTN, DM, Gout, NSAID use \par Scr now 1.3- stable\par \par Hypercalcemia\par 1-25(OH)2 vitamin D mediated - from Sarcoidosis \par Previously on high dose vitamin D which has been stopped. She is currently on prednisone 2.5 mg daily\par SCa++ remains elevated at 11.3\par She  has had multiple rebounds of sarcoid with SHORT tapers;  prolonging therapy at this dose.\par Currently on prednisone 2.5 mg daily; Lasix 20 mg qod  for hypercalciuric effect\par Will consider Ketoconazole for hypercalcemia treatment on next visit\par \par Pt is planned for hysterectomy\par \par No renal contraindication for surgical procedure\par Pt likely with non suppressed HPA axis given prednisone dose < 5 mg daily, however to be on cautious side to prevent any possibility of adrenal crisis, recommend hydrocortisone 25 mg IV x1 prior to surgery and then continue prednisone 2.5 mg daily\par \par Avoid NSAIDs\par \par RTC in 3 months\par \par \par \par \par \par \par \par \par

## 2022-10-06 LAB — SARS-COV-2 N GENE NPH QL NAA+PROBE: NOT DETECTED

## 2022-10-07 ENCOUNTER — APPOINTMENT (OUTPATIENT)
Dept: PULMONOLOGY | Facility: CLINIC | Age: 62
End: 2022-10-07

## 2022-10-07 VITALS
RESPIRATION RATE: 16 BRPM | OXYGEN SATURATION: 98 % | DIASTOLIC BLOOD PRESSURE: 72 MMHG | SYSTOLIC BLOOD PRESSURE: 118 MMHG | HEART RATE: 100 BPM

## 2022-10-07 VITALS — WEIGHT: 171 LBS | HEIGHT: 61 IN | BODY MASS INDEX: 32.28 KG/M2

## 2022-10-07 PROCEDURE — 94010 BREATHING CAPACITY TEST: CPT

## 2022-10-07 PROCEDURE — 99214 OFFICE O/P EST MOD 30 MIN: CPT | Mod: 25

## 2022-10-07 PROCEDURE — 94727 GAS DIL/WSHOT DETER LNG VOL: CPT

## 2022-10-07 PROCEDURE — 94729 DIFFUSING CAPACITY: CPT

## 2022-10-07 PROCEDURE — 85018 HEMOGLOBIN: CPT | Mod: QW

## 2022-10-07 NOTE — PHYSICAL EXAM
[No Acute Distress] : no acute distress [Well Nourished] : well nourished [Well Developed] : well developed [Supple] : supple [Normal Rate/Rhythm] : normal rate/rhythm [Normal S1, S2] : normal s1, s2 [No Murmurs] : no murmurs [No Resp Distress] : no resp distress [No Acc Muscle Use] : no acc muscle use [Normal Rhythm and Effort] : normal rhythm and effort [Clear to Auscultation Bilaterally] : clear to auscultation bilaterally [No Abnormalities] : no abnormalities [Benign] : benign [Not Tender] : not tender [Soft] : soft [No Clubbing] : no clubbing [No Edema] : no edema [No Focal Deficits] : no focal deficits [Oriented x3] : oriented x3 [Normal Appearance] : normal appearance [TextBox_2] : Patient is obese

## 2022-10-07 NOTE — DISCUSSION/SUMMARY
[FreeTextEntry1] : \par #1. CPAP at 6 cm of water to treat moderate to severe WALTER; encouraged compliance in the past though pt is not compliant; Consider dental evaluation for an oral appliance as alternative therapy for treatment of WALTER though no one takes her insurance so have referred pt to the Dr. Access line in the past; The patient understands the risks of untreated WALTER including heart disease, strokes, hypertension, pulmonary hypertension, and motor vehicle accidents as well as the need for treatment and weight loss. \par #2. Lung function testing have been normal; will repeat annually\par #3. Diet and exercise for weight loss\par #4. Repeat chest CT to f/u sarcoidosis as above; pt is on prednisone for hypercalcemia likely from sarcoidosis; repeat CT revealed stable changes; consider repeat CT in one year given current stability\par #5. F/u in 3 months with chest CT\par #6. There is no pulmonary contraindication for the patient's upcoming lymph node bx and uterine surgery.  I would recommend that CPAP at 6 cm of water should be available to use post-op and with sleep though she has not tolerated this therapy in the past. I would also recommend post op incentive spirometry, GI/DVT prophylaxis as needed, early ambulation as able, and adequate pain control. Would recommend that O2 saturation should be monitored during and after the procedure. \par #7. SOBOE is likely related to weight or deconditioning given normal PFTs\par #8. The patient does not appear to require BD therapy at this time\par #9. Follow ACE and Ca levels per rheumatology; prednisone as needed for hypercalcemia thought to be due to her sarcoidosis per nephrology\par #10. S/p obesity medicine eval\par #11. Rheumatology and renal f/u\par #12. Consider repeat PSG/HST with weight loss given non-compliance with therapy for mod-sev WALTER\par #13. Reviewed risks of exposure and symptoms of Covid-19 virus, including how the virus is spread and precautions to avoid kerrie virus.\par \par The patient expressed understanding and agreement with the above recommendations/plan and accepts responsibility to be compliant with recommended testing, therapies, and f/u visits.\par All relevant questions and concerns were addressed.

## 2022-10-07 NOTE — PROCEDURE
[FreeTextEntry1] : PFTs performed previously were normal.\par Spirometry subsequently was normal.\par PFTs 9/18/18 - normal\par PFTs 9/17/19 - normal and without significant change\par PFTs 8/20/20 - normal and without significant change\par PFTs 3/23/22 - normal\par PFTs 10/7/22 - normal

## 2022-10-07 NOTE — CONSULT LETTER
[Dear  ___] : Dear  [unfilled], [Consult Letter:] : I had the pleasure of evaluating your patient, [unfilled]. [Please see my note below.] : Please see my note below. [Consult Closing:] : Thank you very much for allowing me to participate in the care of this patient.  If you have any questions, please do not hesitate to contact me. [Sincerely,] : Sincerely, [DrYasmani  ___] : Dr. GASPAR [DrYasmani ___] : Dr. GASPAR [___] : [unfilled] [FreeTextEntry3] : Leland Sims MD, FCCP, D. ABSM\par Pulmonary and Sleep Medicine\par [facility] [place]\par \par

## 2022-10-07 NOTE — HISTORY OF PRESENT ILLNESS
[Never] : never [Excess Weight] : excess weight [Dyspnea] : dyspnea [Joint Pain] : joint pain [Low Calorie Diet] : low calorie diet [Fair Compliance] : fair compliance with treatment [Fair Tolerance] : fair tolerance of treatment [Diabetes] : diabetes [Hypertension] : hypertension [High] : high [Low Calorie] : low calorie [Well Balanced Diet] : well balanced meals [Infrequently] : exercises infrequently [Walking] : walking [Follow-Up - Routine Clinic] : a routine clinic follow-up of [Excessive Daytime Sleepiness] : excessive daytime sleepiness [Snoring] : snoring [Unrefreshing Sleep] : unrefreshing sleep [Sleepy When Sedentary] : sleepy when sedentary [Currently Experiencing] : The patient is currently experiencing symptoms. [None] : No associated symptoms are reported [CPAP] : CPAP [Poor Compliance] : poor compliance with treatment [Poor Tolerance] : poor tolerance of treatment [Poor Symptom Control] : poor symptom control [TextBox_4] : The patient recently suffered an ankle injury and was on crutches and a wheelchair but now is walking without support.\par She is followed by hematology for anemia.\par She has been on prednisone per nephrology for hypercalcemia.\par Pt dx'd with Covid 19 on 1/8/22 when she developed h/a, congested cough, nasal congestion. She did not require therapy and improved. \par Pt being w/u for abdominal discomfort and reportedly with HI which will require lymph node bx and evaluation of uterus with possible bx and/or hysterectomy. \par  [Witnessed Apnea During Sleep] : no witnessed apnea during sleep [Witnessed Gasping During Sleep] : no witnessed gasping during sleep [de-identified] : at 6 cm of water

## 2022-10-13 ENCOUNTER — APPOINTMENT (OUTPATIENT)
Dept: CT IMAGING | Facility: CLINIC | Age: 62
End: 2022-10-13

## 2022-10-19 ENCOUNTER — APPOINTMENT (OUTPATIENT)
Dept: NEPHROLOGY | Facility: CLINIC | Age: 62
End: 2022-10-19

## 2022-11-21 ENCOUNTER — APPOINTMENT (OUTPATIENT)
Dept: PULMONOLOGY | Facility: CLINIC | Age: 62
End: 2022-11-21

## 2022-11-28 NOTE — DISCUSSION/SUMMARY
[FreeTextEntry1] : \par #1. CPAP at 6 cm of water to treat moderate to severe WALTER; encouraged compliance\par #2. PFTs performed previously and subsequent spirometry were normal \par #3. Diet and exercise for weight loss\par #4. Repeat chest CT to f/u sarcoidosis reveals stability; pt is asymptomatic with normal lung function so would consider repeat chest CT for one year f/u\par #5. F/u in 4-6 weeks to reassess response to CPAP\par #6. Replace equipment as needed; ordered 6/26/19 with [employer] was given to pt to improve compliance\par #7. SOBOE is likely related to weight or deconditioning given normal PFTs\par #8. The patient does not appear to require BD therapy at this time\par #9. Consider dental evaluation for an oral appliance as alternative therapy for treatment of CPAP if clinically indicated\par #10. Check ACE level
Admit to Labor and Delivery

## 2022-11-29 ENCOUNTER — APPOINTMENT (OUTPATIENT)
Dept: GASTROENTEROLOGY | Facility: CLINIC | Age: 62
End: 2022-11-29

## 2022-12-07 ENCOUNTER — APPOINTMENT (OUTPATIENT)
Dept: NEPHROLOGY | Facility: CLINIC | Age: 62
End: 2022-12-07

## 2022-12-07 VITALS
HEIGHT: 61 IN | OXYGEN SATURATION: 99 % | WEIGHT: 172 LBS | SYSTOLIC BLOOD PRESSURE: 142 MMHG | DIASTOLIC BLOOD PRESSURE: 82 MMHG | BODY MASS INDEX: 32.47 KG/M2 | HEART RATE: 92 BPM

## 2022-12-07 PROCEDURE — 99214 OFFICE O/P EST MOD 30 MIN: CPT

## 2022-12-07 RX ORDER — FUROSEMIDE 20 MG/1
20 TABLET ORAL
Qty: 30 | Refills: 2 | Status: DISCONTINUED | COMMUNITY
Start: 2020-09-11 | End: 2022-12-07

## 2022-12-12 NOTE — PHYSICAL EXAM
[General Appearance - Alert] : alert [General Appearance - In No Acute Distress] : in no acute distress [Sclera] : the sclera and conjunctiva were normal [Strabismus] : no strabismus was seen [Outer Ear] : the ears and nose were normal in appearance [Hearing Threshold Finger Rub Not [place]] : hearing was normal [Neck Appearance] : the appearance of the neck was normal [] : no respiratory distress [Respiration, Rhythm And Depth] : normal respiratory rhythm and effort [Auscultation Breath Sounds / Voice Sounds] : lungs were clear to auscultation bilaterally [Heart Rate And Rhythm] : heart rate was normal and rhythm regular [Heart Sounds] : normal S1 and S2 [Heart Sounds Gallop] : no gallops [Edema] : there was no peripheral edema [Abdomen Soft] : soft [Abdomen Tenderness] : non-tender [No CVA Tenderness] : no ~M costovertebral angle tenderness [Abnormal Walk] : normal gait [No Focal Deficits] : no focal deficits [Oriented To Time, Place, And Person] : oriented to person, place, and time [FreeTextEntry1] : psoriatic rash on both legs+

## 2022-12-12 NOTE — REVIEW OF SYSTEMS
[Negative] : Respiratory [Fever] : no fever [Chills] : no chills [Feeling Tired] : not feeling tired [Eye Pain] : no eye pain [Red Eyes] : eyes not red [Earache] : no earache [Loss Of Hearing] : no hearing loss [Heart Rate Is Slow] : the heart rate was not slow [Heart Rate Is Fast] : the heart rate was not fast [Lower Ext Edema] : no lower extremity edema [Shortness Of Breath] : no shortness of breath [Wheezing] : no wheezing [Cough] : no cough [SOB on Exertion] : no shortness of breath during exertion [Abdominal Pain] : no abdominal pain [Constipation] : no constipation [Diarrhea] : no diarrhea [Dysuria] : no dysuria [Arthralgias] : no arthralgias [Confused] : no confusion [Convulsions] : no convulsions [Dizziness] : no dizziness [Suicidal] : not suicidal [Sleep Disturbances] : no sleep disturbances [Anxiety] : no anxiety [Muscle Weakness] : no muscle weakness [Easy Bleeding] : no tendency for easy bleeding [Easy Bruising] : no tendency for easy bruising [de-identified] : psoriasis; LE lesions

## 2022-12-12 NOTE — HISTORY OF PRESENT ILLNESS
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

## 2022-12-12 NOTE — ASSESSMENT
[FreeTextEntry1] : CKD III in the setting of HTN, DM, Gout, NSAID use \par Scr now 1.3- stable\par \par Hypercalcemia\par 1-25(OH)2 vitamin D mediated - from Sarcoidosis \par Previously on high dose vitamin D which has been stopped. She is currently on prednisone 2.5 mg daily\par SCa++ remains elevated at 11.3\par She  has had multiple rebounds of sarcoid with SHORT tapers;  prolonging therapy at this dose.\par Currently on prednisone 2.5 mg daily; Lasix 20 mg qod  for hypercalciuric effect\par Avoid NSAIDs\par \par RTC in 3 months\par \par \par \par \par \par \par \par \par

## 2022-12-20 ENCOUNTER — APPOINTMENT (OUTPATIENT)
Dept: GASTROENTEROLOGY | Facility: CLINIC | Age: 62
End: 2022-12-20

## 2022-12-20 ENCOUNTER — NON-APPOINTMENT (OUTPATIENT)
Age: 62
End: 2022-12-20

## 2022-12-26 ENCOUNTER — OUTPATIENT (OUTPATIENT)
Dept: OUTPATIENT SERVICES | Facility: HOSPITAL | Age: 62
LOS: 1 days | End: 2022-12-26

## 2022-12-26 ENCOUNTER — APPOINTMENT (OUTPATIENT)
Dept: CT IMAGING | Facility: CLINIC | Age: 62
End: 2022-12-26
Payer: MEDICAID

## 2022-12-26 DIAGNOSIS — R91.8 OTHER NONSPECIFIC ABNORMAL FINDING OF LUNG FIELD: ICD-10-CM

## 2022-12-26 PROCEDURE — 71250 CT THORAX DX C-: CPT | Mod: 26

## 2022-12-27 LAB
25(OH)D3 SERPL-MCNC: 25.5 NG/ML
ALBUMIN SERPL ELPH-MCNC: 4.2 G/DL
ANION GAP SERPL CALC-SCNC: 11 MMOL/L
APPEARANCE: CLEAR
BACTERIA: NEGATIVE
BASOPHILS # BLD AUTO: 0.02 K/UL
BASOPHILS NFR BLD AUTO: 0.5 %
BILIRUBIN URINE: NEGATIVE
BLOOD URINE: NEGATIVE
BUN SERPL-MCNC: 39 MG/DL
CALCIUM SERPL-MCNC: 11.7 MG/DL
CALCIUM SERPL-MCNC: 11.7 MG/DL
CHLORIDE SERPL-SCNC: 99 MMOL/L
CO2 SERPL-SCNC: 27 MMOL/L
COLOR: NORMAL
CREAT SERPL-MCNC: 1.11 MG/DL
EGFR: 56 ML/MIN/1.73M2
EOSINOPHIL # BLD AUTO: 0.19 K/UL
EOSINOPHIL NFR BLD AUTO: 4.6 %
GLUCOSE QUALITATIVE U: NEGATIVE
GLUCOSE SERPL-MCNC: 92 MG/DL
HCT VFR BLD CALC: 36.4 %
HGB BLD-MCNC: 10.8 G/DL
HYALINE CASTS: 0 /LPF
IMM GRANULOCYTES NFR BLD AUTO: 0.5 %
KETONES URINE: NEGATIVE
LEUKOCYTE ESTERASE URINE: NEGATIVE
LYMPHOCYTES # BLD AUTO: 1.11 K/UL
LYMPHOCYTES NFR BLD AUTO: 26.7 %
MAN DIFF?: NORMAL
MCHC RBC-ENTMCNC: 24.9 PG
MCHC RBC-ENTMCNC: 29.7 GM/DL
MCV RBC AUTO: 84.1 FL
MICROSCOPIC-UA: NORMAL
MONOCYTES # BLD AUTO: 0.45 K/UL
MONOCYTES NFR BLD AUTO: 10.8 %
NEUTROPHILS # BLD AUTO: 2.37 K/UL
NEUTROPHILS NFR BLD AUTO: 56.9 %
NITRITE URINE: NEGATIVE
PARATHYROID HORMONE INTACT: 13 PG/ML
PH URINE: 6
PHOSPHATE SERPL-MCNC: 3.2 MG/DL
PLATELET # BLD AUTO: 280 K/UL
POTASSIUM SERPL-SCNC: 5.1 MMOL/L
PROTEIN URINE: NEGATIVE
RBC # BLD: 4.33 M/UL
RBC # FLD: 15.1 %
RED BLOOD CELLS URINE: 1 /HPF
SODIUM SERPL-SCNC: 137 MMOL/L
SPECIFIC GRAVITY URINE: 1.01
SQUAMOUS EPITHELIAL CELLS: 1 /HPF
UROBILINOGEN URINE: NORMAL
WBC # FLD AUTO: 4.16 K/UL
WHITE BLOOD CELLS URINE: 1 /HPF

## 2023-01-05 LAB
ALBUMIN SERPL ELPH-MCNC: 4.3 G/DL
ANION GAP SERPL CALC-SCNC: 11 MMOL/L
BUN SERPL-MCNC: 29 MG/DL
CALCIUM SERPL-MCNC: 10.6 MG/DL
CALCIUM SERPL-MCNC: 10.6 MG/DL
CHLORIDE SERPL-SCNC: 102 MMOL/L
CO2 SERPL-SCNC: 26 MMOL/L
CREAT SERPL-MCNC: 1.07 MG/DL
EGFR: 58 ML/MIN/1.73M2
GLUCOSE SERPL-MCNC: 83 MG/DL
PARATHYROID HORMONE INTACT: 15 PG/ML
PHOSPHATE SERPL-MCNC: 3.1 MG/DL
POTASSIUM SERPL-SCNC: 4.7 MMOL/L
SODIUM SERPL-SCNC: 140 MMOL/L

## 2023-01-12 ENCOUNTER — APPOINTMENT (OUTPATIENT)
Dept: PULMONOLOGY | Facility: CLINIC | Age: 63
End: 2023-01-12
Payer: MEDICAID

## 2023-01-12 VITALS
HEIGHT: 61 IN | WEIGHT: 170 LBS | SYSTOLIC BLOOD PRESSURE: 124 MMHG | RESPIRATION RATE: 16 BRPM | BODY MASS INDEX: 32.1 KG/M2 | HEART RATE: 103 BPM | DIASTOLIC BLOOD PRESSURE: 66 MMHG | OXYGEN SATURATION: 98 %

## 2023-01-12 PROCEDURE — 99214 OFFICE O/P EST MOD 30 MIN: CPT

## 2023-01-12 NOTE — CONSULT LETTER
[Dear  ___] : Dear  [unfilled], [Consult Letter:] : I had the pleasure of evaluating your patient, [unfilled]. [Please see my note below.] : Please see my note below. [Consult Closing:] : Thank you very much for allowing me to participate in the care of this patient.  If you have any questions, please do not hesitate to contact me. [Sincerely,] : Sincerely, [DrYasmani  ___] : Dr. GASPAR [DrYasmani ___] : Dr. GASPAR [___] : [unfilled] [FreeTextEntry3] : Leland Sims MD, FCCP, D. ABSM\par Pulmonary and Sleep Medicine\par [facility] [place]\par \par

## 2023-01-12 NOTE — HISTORY OF PRESENT ILLNESS
[Excess Weight] : excess weight [Dyspnea] : dyspnea [Joint Pain] : joint pain [Low Calorie Diet] : low calorie diet [Fair Compliance] : fair compliance with treatment [Fair Tolerance] : fair tolerance of treatment [Diabetes] : diabetes [Hypertension] : hypertension [High] : high [Low Calorie] : low calorie [Well Balanced Diet] : well balanced meals [Infrequently] : exercises infrequently [Walking] : walking [Follow-Up - Routine Clinic] : a routine clinic follow-up of [Excessive Daytime Sleepiness] : excessive daytime sleepiness [Snoring] : snoring [Unrefreshing Sleep] : unrefreshing sleep [Sleepy When Sedentary] : sleepy when sedentary [Currently Experiencing] : The patient is currently experiencing symptoms. [None] : No associated symptoms are reported [CPAP] : CPAP [Poor Compliance] : poor compliance with treatment [Poor Tolerance] : poor tolerance of treatment [Poor Symptom Control] : poor symptom control [TextBox_4] : The patient recently suffered an ankle injury and was on crutches and a wheelchair but now is walking without support.\par She is followed by hematology for anemia.\par She has been on prednisone per nephrology for hypercalcemia.\par Pt dx'd with Covid 19 on 1/8/22 when she developed h/a, congested cough, nasal congestion. She did not require therapy and improved. \par Pt being w/u for abdominal discomfort and reportedly with HI which will require lymph node bx and evaluation of uterus with possible bx and/or hysterectomy. \par Pt s/p Covid infection again on 12/15/22 with mild throat soreness but without other complaints. Pt did not require therapy.\par  [Witnessed Apnea During Sleep] : no witnessed apnea during sleep [Witnessed Gasping During Sleep] : no witnessed gasping during sleep [de-identified] : at 6 cm of water [On ___] : performed on [unfilled] [Patient] : the patient [To Assess ___] : to assess [unfilled] [FreeTextEntry9] : Chest CT [FreeTextEntry8] : increasing mediastinal and hilar HI

## 2023-01-12 NOTE — DISCUSSION/SUMMARY
[FreeTextEntry1] : \par #1. CPAP at 6 cm of water to treat moderate to severe WALTER; encouraged compliance in the past though pt is not compliant; Consider dental evaluation for an oral appliance as alternative therapy for treatment of WALTER though no one takes her insurance so have referred pt to the Dr. Access line in the past; The patient understands the risks of untreated WALTER including heart disease, strokes, hypertension, pulmonary hypertension, and motor vehicle accidents as well as the need for treatment and weight loss. \par #2. Lung function testing have been normal; will repeat annually\par #3. Diet and exercise for weight loss\par #4. Repeat chest CT to f/u sarcoidosis as above; prednisone for hypercalcemia likely from sarcoidosis as needed per renal; repeat CT revealed stable changes; consider repeat CT in one year given current stability\par #5. F/u in 6-8 months with PFTs\par #6. Gyn f/u for pelvic HI\par #7. SOBOE is likely related to weight or deconditioning given normal PFTs\par #8. The patient does not appear to require BD therapy at this time\par #9. Follow ACE and Ca levels per rheumatology; prednisone as needed for hypercalcemia thought to be due to her sarcoidosis per nephrology\par #10. S/p obesity medicine eval\par #11. Rheumatology and renal f/u\par #12. Consider repeat PSG/HST with weight loss given non-compliance with therapy for mod-sev WALTER but pt unwilling for now\par #13. Reviewed risks of exposure and symptoms of Covid-19 virus, including how the virus is spread and precautions to avoid kerrie virus.\par \par The patient expressed understanding and agreement with the above recommendations/plan and accepts responsibility to be compliant with recommended testing, therapies, and f/u visits.\par All relevant questions and concerns were addressed.

## 2023-01-16 ENCOUNTER — EMERGENCY (EMERGENCY)
Facility: HOSPITAL | Age: 63
LOS: 1 days | Discharge: HOME | End: 2023-01-16
Attending: EMERGENCY MEDICINE
Payer: COMMERCIAL

## 2023-01-16 VITALS
HEART RATE: 99 BPM | SYSTOLIC BLOOD PRESSURE: 152 MMHG | TEMPERATURE: 98 F | RESPIRATION RATE: 16 BRPM | OXYGEN SATURATION: 100 % | WEIGHT: 169.98 LBS | DIASTOLIC BLOOD PRESSURE: 76 MMHG

## 2023-01-16 PROCEDURE — 73090 X-RAY EXAM OF FOREARM: CPT | Mod: 26,LT

## 2023-01-16 PROCEDURE — 99284 EMERGENCY DEPT VISIT MOD MDM: CPT | Mod: 25

## 2023-01-16 PROCEDURE — 73080 X-RAY EXAM OF ELBOW: CPT | Mod: 26,LT

## 2023-01-16 PROCEDURE — 73090 X-RAY EXAM OF FOREARM: CPT

## 2023-01-16 PROCEDURE — 29125 APPL SHORT ARM SPLINT STATIC: CPT | Mod: LT

## 2023-01-16 PROCEDURE — 99284 EMERGENCY DEPT VISIT MOD MDM: CPT

## 2023-01-16 PROCEDURE — 29125 APPL SHORT ARM SPLINT STATIC: CPT

## 2023-01-16 PROCEDURE — 73080 X-RAY EXAM OF ELBOW: CPT

## 2023-01-16 RX ORDER — ACETAMINOPHEN 500 MG
975 TABLET ORAL ONCE
Refills: 0 | Status: COMPLETED | OUTPATIENT
Start: 2023-01-16 | End: 2023-01-16

## 2023-01-16 RX ORDER — OXYCODONE HYDROCHLORIDE 5 MG/1
5 TABLET ORAL ONCE
Refills: 0 | Status: DISCONTINUED | OUTPATIENT
Start: 2023-01-16 | End: 2023-01-16

## 2023-01-16 RX ADMIN — Medication 975 MILLIGRAM(S): at 10:58

## 2023-01-16 NOTE — ED PROVIDER NOTE - OBJECTIVE STATEMENT
62 year old male with no med hx presented to ED c/o left elbow pain s/p hitting her elbow on the wall. took ibuprofen last night. admits to pain. denies numbness, tingling, decrease in ROM, erythema/warmth/swelling and fever/chills

## 2023-01-16 NOTE — ED PROVIDER NOTE - ATTENDING APP SHARED VISIT CONTRIBUTION OF CARE
61 yo M c/o L elbow pain after hitting it against the wall.  On exam awake and alert in NAD.  L elbow with no appreciable deformity, + TTP lateral aspect, skin intact, NVI, + distal pulses.  will check x-rays and re-eval

## 2023-01-16 NOTE — ED PROVIDER NOTE - PATIENT PORTAL LINK FT
You can access the FollowMyHealth Patient Portal offered by [facility] by registering at the following website: [email or web address]. By joining [employer]’s FollowMyHealth portal, you will also be able to view your health information using other applications (apps) compatible with our system.

## 2023-01-16 NOTE — ED PROVIDER NOTE - NSFOLLOWUPCLINICS_GEN_ALL_ED_FT
[facility] General Orthopedics  Orthopedics  [address] [place] [id number]  Phone: [phone]  Fax:

## 2023-01-16 NOTE — ED PROVIDER NOTE - CLINICAL SUMMARY MEDICAL DECISION MAKING FREE TEXT BOX
62 year old male with no med hx presented to ED c/o left elbow pain s/p hitting her elbow on the wall.

## 2023-01-16 NOTE — ED PROVIDER NOTE - NSFOLLOWUPINSTRUCTIONS_ED_ALL_ED_FT
Follow up with orthopedics within 1 -2 days   take tylenol for pain every 6 hours   Keep splint dry in sling, elevated     Return if new or worsening symptoms     Fracture    A fracture is a break in one of your bones. This can occur from a variety of injuries, especially traumatic ones. Symptoms include pain, bruising, or swelling. Do not use the injured limb. If a fracture is in one of the bones below your waist, do not put weight on that limb unless instructed to do so by your healthcare provider. Crutches or a cane may have been provided. A splint or cast may have been applied by your health care provider. Make sure to keep it dry and follow up with an orthopedist as instructed.    SEEK IMMEDIATE MEDICAL CARE IF YOU HAVE ANY OF THE FOLLOWING SYMPTOMS: numbness, tingling, increasing pain, or weakness in any part of the injured limb.

## 2023-01-19 ENCOUNTER — APPOINTMENT (OUTPATIENT)
Dept: ORTHOPEDIC SURGERY | Facility: CLINIC | Age: 63
End: 2023-01-19
Payer: MEDICAID

## 2023-01-19 VITALS
DIASTOLIC BLOOD PRESSURE: 76 MMHG | HEART RATE: 88 BPM | HEIGHT: 61 IN | WEIGHT: 170 LBS | SYSTOLIC BLOOD PRESSURE: 136 MMHG | BODY MASS INDEX: 32.1 KG/M2

## 2023-01-19 DIAGNOSIS — S52.123A DISPLACED FRACTURE OF HEAD OF UNSPECIFIED RADIUS, INITIAL ENCOUNTER FOR CLOSED FRACTURE: ICD-10-CM

## 2023-01-19 PROCEDURE — 24650 CLTX RDL HEAD/NCK FX WO MNPJ: CPT

## 2023-01-19 PROCEDURE — 99213 OFFICE O/P EST LOW 20 MIN: CPT | Mod: 25

## 2023-01-19 NOTE — REASON FOR VISIT
[Initial Visit] : an initial visit for [[employer] ] : provided by [employer]   [FreeTextEntry2] : Left elbow pain  [Interpreters_IDNumber] : [id number] [Interpreters_FullName] : Wyatt [TWNoteComboBox1] : [place]

## 2023-01-19 NOTE — HISTORY OF PRESENT ILLNESS
[Worsening] : worsening [Intermit.] : ~He/She~ states the symptoms seem to be intermittent [Direct Pressure] : worsened by direct pressure [Lifting] : worsened by lifting [Rest] : relieved by rest [de-identified] : 01/19/2023 : RADHA BRAND  is a 62 year  old female who presents to the office for evaluation of her left elbow.  This visit was performed with the use of a .  She states on the 14th or 15 January she banged her elbow twice and after that has had pain in the elbow.  On the 16th she went to the hospital and had x-rays taken and they said there was a possible small fracture and placed her in a splint and sling and advised her to follow-up with an orthopedic.  She is here for specialist opinion today.  She localized the pain to the lateral aspect of the elbow and states it is worse with movement and activity  and alleviated with rest.  She states the pain can be sharp with movement and has gotten mildly better since the injury.  She is here for specialist opinion today.  She denies any numbness or tingling distally.  She has no other complaints today.

## 2023-01-19 NOTE — PHYSICAL EXAM
[de-identified] : General:\par Awake, alert, no acute distress, Patient was cooperative and appropriate during the examination.\par \par The patient is of normal weight for height and age.\par \par Walks without an antalgic gait.\par \par Full, painless range of motion of the neck and back.\par \par Exam of the bilateral lower extremities is intact and symmetric with regards to dermatologic, vascular, and neurologic exam. Bilateral lower extremity sensation is grossly intact to light touch in the DP/SP/T/S/S nerve distributions. Intact DF/PF/EHL. BIlateral lower extremities warm and well-perfused with brisk capillary refill.\par \par \par Pulmonary:\par Regular, nonlabored breathing\par \par Abdomen:\par Soft, nontender, nondistended.\par \par Lymphatic:\par No evidence of axillary lymphadenopathy\par \par Left Elbow Exam:\par Physical exam of the elbow demonstrates normal skin without signs of skin changes or abnormalities. No erythema, warmth, or joint effusion appreciated. \par  \par Sensation intact light touch C5-T1\par Palpable radial pulse\par Radial/ulnar/median/axillary/musculocutaneous/AIN/PIN nerves grossly intact\par  \par Range of motion:\par Flexion-Extension 20-90\par Pronation-Supination 80-80\par  \par Palpation:\par Not tender to palpation over the lateral epicondyle\par Not tender palpation over the medial epicondyle\par Moderately tender to palpation over the radial head\par Not tender to palpation over the olecranon\par Not tender to palpation over the distal biceps insertion\par Not tender to palpation over the distal triceps insertion\par Not tender to palpation over the cubital tunnel\par  \par Strength testing:\par Not tested today\par  \par Special Tests:\par No varus/valgus laxity at 0 and 30 degrees of elbow flexion\par No pain with resisted wrist/finger extension and forearm supination\par No pain with resisted wrist/finger flexion and forearm pronation\par Negative hook test\par Negative Tinel's over the carpal and cubital tunnels\par   [de-identified] : X-ray 1 radial head view of the elbow taken in the office today on 1/19/2023 shows a subtle nondisplaced radial head fracture in anatomic alignment without displacement of the superior extent of the articular margin.\par \par X-ray 3 views of the left elbow taken in the emergency room at [facility] on 1/16/2022 reveals no acute fracture dislocation with a posterior sail sign indicative of a possible occult radial head fracture

## 2023-02-16 ENCOUNTER — APPOINTMENT (OUTPATIENT)
Dept: ORTHOPEDIC SURGERY | Facility: CLINIC | Age: 63
End: 2023-02-16
Payer: MEDICAID

## 2023-02-16 VITALS
HEART RATE: 79 BPM | SYSTOLIC BLOOD PRESSURE: 136 MMHG | TEMPERATURE: 97.9 F | HEIGHT: 61 IN | BODY MASS INDEX: 32.1 KG/M2 | WEIGHT: 170 LBS | DIASTOLIC BLOOD PRESSURE: 84 MMHG

## 2023-02-16 PROCEDURE — 73080 X-RAY EXAM OF ELBOW: CPT | Mod: LT

## 2023-02-16 PROCEDURE — 99024 POSTOP FOLLOW-UP VISIT: CPT

## 2023-02-16 NOTE — DISCUSSION/SUMMARY
[de-identified] : Assessment: 62-year-old female with left elbow radial head fracture, nondisplaced\par \par Plan: I had a long discussion with the patient today regarding the nature of their diagnosis and treatment plan. We discussed the risks and benefits of no treatment as well as nonoperative and operative treatments. I reviewed the x-rays today which revealed a subtle nondisplaced  Radial head fracture of the left elbow and no other acute findings, well healing with consolidation and callus formation noted.  At this time I am recommending continued conservative treatment including ice, heat, rest, over-the-counter medication for symptomatic relief.  GI precautions were discussed.  I am recommending that she avoid any heavy lifting, pushing or pulling with the left upper extremity for another 2 weeks but can perform range of motion as tolerated.  In 2 weeks she can gradually increase gentle strengthening.  I emphasized the importance of gradual increase in strengthening and advised her to avoid exacerbating activities.  She will follow-up in 2 months for repeat evaluation and final x-rays of the elbow.  Patient discussed and reviewed with Dr. Sim today.\par \par The patient verbalizes their understanding and agrees with the plan.  All questions were answered to their satisfaction.

## 2023-02-16 NOTE — REASON FOR VISIT
[Initial Visit] : an initial visit for [[employer] ] : provided by [employer]   [FreeTextEntry2] : Left elbow pain  [Interpreters_IDNumber] : [id number] [Interpreters_FullName] : Wyatt [TWNoteComboBox1] : [place]

## 2023-02-16 NOTE — HISTORY OF PRESENT ILLNESS
[Worsening] : worsening [Intermit.] : ~He/She~ states the symptoms seem to be intermittent [Direct Pressure] : worsened by direct pressure [Lifting] : worsened by lifting [Rest] : relieved by rest [de-identified] : 02/16/2023 : RADHA BRAND  is a 63 year  old female who presents to the office for follow-up evaluation of the left elbow.  She states that since her last office visit she has had significant improvement in her pain and no longer has any significant pain in her elbow.  She states she gets mild comfort when she leans on the elbow but does not have any significant pain in the elbow and states her pain has significantly improved since her last office visit.  She states the pain is very mild.  She states that she is doing very well and is here for routine follow-up today.  She denies any numbness or tingling distally.  She has no other complaints today.\par \par 01/19/2023 : RADHA BRAND  is a 62 year  old female who presents to the office for evaluation of her left elbow.  This visit was performed with the use of a .  She states on the 14th or 15 January she banged her elbow twice and after that has had pain in the elbow.  On the 16th she went to the hospital and had x-rays taken and they said there was a possible small fracture and placed her in a splint and sling and advised her to follow-up with an orthopedic.  She is here for specialist opinion today.  She localized the pain to the lateral aspect of the elbow and states it is worse with movement and activity  and alleviated with rest.  She states the pain can be sharp with movement and has gotten mildly better since the injury.  She is here for specialist opinion today.  She denies any numbness or tingling distally.  She has no other complaints today.

## 2023-02-16 NOTE — PHYSICAL EXAM
[de-identified] : General:\par Awake, alert, no acute distress, Patient was cooperative and appropriate during the examination.\par \par The patient is of normal weight for height and age.\par \par Walks without an antalgic gait.\par \par Full, painless range of motion of the neck and back.\par \par Exam of the bilateral lower extremities is intact and symmetric with regards to dermatologic, vascular, and neurologic exam. Bilateral lower extremity sensation is grossly intact to light touch in the DP/SP/T/S/S nerve distributions. Intact DF/PF/EHL. BIlateral lower extremities warm and well-perfused with brisk capillary refill.\par \par \par Pulmonary:\par Regular, nonlabored breathing\par \par Abdomen:\par Soft, nontender, nondistended.\par \par Lymphatic:\par No evidence of axillary lymphadenopathy\par \par Left Elbow Exam:\par Physical exam of the elbow demonstrates normal skin without signs of skin changes or abnormalities. No erythema, warmth, or joint effusion appreciated. \par  \par Sensation intact light touch C5-T1\par Palpable radial pulse\par Radial/ulnar/median/axillary/musculocutaneous/AIN/PIN nerves grossly intact\par  \par Range of motion:\par Flexion-Extension 0-130\par Pronation-Supination 85-85\par  \par Palpation:\par Not tender to palpation over the lateral epicondyle\par Not tender palpation over the medial epicondyle\par Minimally tender to palpation over the radial head\par Not tender to palpation over the olecranon\par Not tender to palpation over the distal biceps insertion\par Not tender to palpation over the distal triceps insertion\par Not tender to palpation over the cubital tunnel\par  \par Strength testing:\par Not tested today\par  \par Special Tests:\par No varus/valgus laxity at 0 and 30 degrees of elbow flexion\par No pain with resisted wrist/finger extension and forearm supination\par No pain with resisted wrist/finger flexion and forearm pronation\par Negative hook test\par Negative Tinel's over the carpal and cubital tunnels\par   [de-identified] : X-rays 3 views of the left elbow taken in the office today on 2/16/2023 reveals a well-healing radial head fracture with callus formation and consolidation noted, well-healing and appropriate alignment with joint space is well-maintained.\par \par X-ray 1 radial head view of the elbow taken in the office today on 1/19/2023 shows a subtle nondisplaced radial head fracture in anatomic alignment without displacement of the superior extent of the articular margin.\par \par X-ray 3 views of the left elbow taken in the emergency room at [facility] on 1/16/2022 reveals no acute fracture dislocation with a posterior sail sign indicative of a possible occult radial head fracture

## 2023-03-18 NOTE — ED PROVIDER NOTE - TOBACCO USE
HEMATOLOGY/ONCOLOGY PROGRESS NOTE      Diagnosis: Acute myeloid leukemia    History Of Present Illness  Sydnie is a 64 year old female who was transferred from [facility] for higher level care and management of newly diagnosed acute myeloid leukemia.  She presented to the hospital initially on 2/22/23 with feeling weak, dizziness, and diarrhea x5 days.  At time of admission she was noted to be febrile 100.9 and initial CBC showed pancytopenia with abnormal cells (blasts).  Peripheral blood flow showed 68% myeloid blasts (CD34+, HLADR+, +, MPO+, CD13+).  Peripheral FISH 15;17 negative; FLT3 ITD positive (low allelic burden <0.5).  She underwent bone marrow biopsy on 2/23/2023 that showed 60-70% blasts, consistent with involvement of acute myeloid leukemia.  Cytogenetics and NGS panel pending.  She was treated with empiric course of IV Zosyn x4 days.  Fevers resolved and infectious workup negative.  She was transferred to [facility] on 2/28/23.   This morning she reports feeling well.  She denies any complaints.  She feels overall better than when she initially presented to the hospital.  She reports very good functional status prior to presentation.  She lives independently and works as a .  She has no past medical history.  We discussed diagnosis of AML today.  We discussed potential induction therapy, pending cytogenetic studies.  We discussed plan for ECHO and PICC line today.    3/2/2023: NO acute events.  PICC line placed and ECHO completed.  Cytogenetics signed out as normal.  She feels well today and denies any complaints.  We discussed induction chemotherapy with cytarabine, daunorubicin, and midostaurin.  Risks/benefits and anticipated toxicity of therapy was discussed and all questions were answered.  She verbalized understanding and consent was signed.    3/3/2023: No acute events.  Initiated chemotherapy yesterday afternoon.  Says she feels overall well today.  She denies any  nausea/vomiting.  She reports overall good appetite.  She does have increased urination, no burning, likely due to IVF.  She is ambulating around room without issues.    3/4/2023: Tolerating chemotherapy well. Denies nausea or vomiting. Eating and drinking okay. Denies constipation. Able to ambulate the hallway yesterday with no difficulty.     3/5/2023: No specific complaints. Ambulating hallway twice daily.     3/6/2023: Day 5 of 7+3 and midostaurin.  Feels well overall.  Denies any nausea/vomiting, diarrhea.  Ambulating well. Appetite is good.    3/7/2023: Day 6.  Feels well overall.  Does note increased bilateral LE and LUE swelling.  She had small drops of blood with BM yesterday she believes is secondary to her hemorrhoids.  She had some heartburn this morning.  No fevers/chills, nausea/vomiting.    3/8/2023: Day 7.  Feels well this morning.  LUE swelling improved.  She had good urine output after lasix yesterday.  No further bleeding.  Eating well.    3/9/2023: Day 8.  NO acute events.  Remains afebrile.  No BM x2 days but does not feel constipated.  Good urine output again with lasix.  Arm swelling improved.    3/10/2023: Day 9.  Febrile 38.7 yesterday.  Infectious workup sent - negative thus far.  Started on Cefepime.  She feels overall well, no changes.  She has not had BM in 3 days and took laxative this morning.  She denies any nausea/vomiting.  Swelling has overall improved.    3/11/2023: Day 10.  Afebrile overnight.  Remains on Cefepime.  Had BM yesterday, no diarrhea, no blood.  Ambulating well, appetite is good today.  Denies any nausea/vomiting.  She reports improvement in LUE swelling.    3/12/2023: Day 11.  Remains afebrile.  Normal BM.  No nausea/vomiting.  Having some fatigue.  LE swelling improved.    3/13/2023: Day 12.  No acute events.  Remains afebrile.  No nausea/vomiting.  No bleeding.  LE swelling improved.  CBC stable - no transfusion requirement today.    3/14/2023: Day 13, NO acute  events.  Reports constipation and hemorrhoid irritation.  No bleeding.  No abdominal pain.  Remains afebrile.  Requiring blood transfusion today.  Eating well, ambulating.    3/15/2023: Day 14.  Low grade fever last night, was not sustained on repeat temp check.  However infectious workup was done and Cefepime was changed to Zosyn for better abdominal coverage (anaerobic).  She feels well this morning other than continued constipation.  She denies any abdominal pain or discomfort.  She denies any bleeding.  Needs platelet transfusion today.    3/16/2023: Day 15.  No acute events.  No fevers.  Had multiple bowel movements yesterday and today.  Denies any bleeding.  Feels tired but otherwise well.      3/17/2023: Day 16.  No acute events.  No fevers.  Had loose bowel movement this morning.  Denies any bleeding.  Eating well. Did not walk yesterday but will today.    3/18/2023: Day 17 chemotherapy today. Only complaint today is hemorrhoidal pain. Severe rectal pain with movement/ambulation. Bowel movements are soft -- 1-2 daily. Rectal site inspected this morning -- noted inflammed external hemorrhoids, mild erythema, no obvious evidence of sores/abscess, and no open sores. Remains afebrile. No bleeding. Ambulating in room. Tolerating PO.     Past Medical History  Past Medical History:   Diagnosis Date   • Alopecia    • Rosacea         Surgical History  Past Surgical History:   Procedure Laterality Date   •  delivery only     • Cholecystectomy     • Dilation and curettage     • Hernia repair     • Hysterectomy         Medications  Medications Prior to Admission   Medication Sig Dispense Refill   • Multiple Minerals (Calcium-Magnesium-Zinc) Tab Take 1 tablet by mouth 3 days a week.     • minoxidil (LONITEN) 2.5 MG tablet Take 2.5 mg by mouth 3 days a week. MWF     • fluticasone (FLONASE) 50 MCG/ACT nasal spray Spray 2 sprays in each nostril daily. (Patient taking differently: Spray 2 sprays in each nostril daily  as needed.) 16 g 12   • VITAMIN D, CHOLECALCIFEROL, PO Take 1 capsule by mouth 3 days a week.     • desonide (DESOWEN) 0.05 % cream Apply topically 2 times daily. APPLY TO THE AFFECTED AREA AS DIRECTED (Patient taking differently: Apply 1 application. topically daily as needed. APPLY TO THE AFFECTED AREA AS DIRECTED) 120 g 3   • Ferrous Sulfate (IRON) 325 (65 Fe) MG Tab Take 1 tablet by mouth 1 day a week.         Review of Systems  10 systems reviewed, negative except noted in HPI     Last Recorded Vitals  Blood pressure (!) 143/78, pulse 73, temperature 98.4 °F (36.9 °C), temperature source Oral, resp. rate 16, height 5' 9\" (1.753 m), weight 109.4 kg (241 lb 2.9 oz), SpO2 95 %.    Physical Exam  ECO  General:  Alert and oriented. No acute distress.  Head:  Normocephalic, without obvious abnormality.  Eyes:  Conjunctiva clear, No icterus.   Neck: No enlarged cervical, supraclavicular lymphadenopathy.  Lungs:   Clear to ascultation; symmetrical breath sounds.   Heart:  Regular rate and rhythm. S1 and S2 normal. No murmur, rub or gallop.  Abdomen:  Soft, non-tender, bowel sounds normal.  No hepatomegaly.  No splenomegaly.  Extremities:   Trace bilateral LE edema, LUE mild edema resolved  Lymph Nodes:  Cervical, supraclavicular or axillary nodes normal.  Musculoskeletal: Symmetrical strength; no focal weakness  Skin:  No ecchymosis.  No rash.   Neurologic:  Alert and oriented x 3.  Normal affect.  No focal motor defects.       Labs     WBC (K/mcL)   Date Value   2023 0.4 (LL)     RBC (mil/mcL)   Date Value   2023 2.39 (L)     HCT (%)   Date Value   2023 21.0 (L)     HGB (g/dL)   Date Value   2023 7.0 (L)     PLT (K/mcL)   Date Value   2023 17 (LL)     GOT/AST (Units/L)   Date Value   2023 11     GPT/ALT (Units/L)   Date Value   2023 24     No results found for: GGTP  Alkaline Phosphatase (Units/L)   Date Value   2023 58     Bilirubin, Total (mg/dL)   Date Value    03/18/2023 0.2     Sodium (mmol/L)   Date Value   03/18/2023 141     Potassium (mmol/L)   Date Value   03/18/2023 3.0 (L)     Chloride (mmol/L)   Date Value   03/18/2023 107     Carbon Dioxide (mmol/L)   Date Value   03/18/2023 33 (H)     BUN (mg/dL)   Date Value   03/18/2023 7     Creatinine (mg/dL)   Date Value   03/18/2023 0.59         ASSESSMENT/PLAN:  63 y/o female who presents for:    Acute myeloid leukemia 2/24/2023  FLT3 ITD positive (low allelic burden) from peripheral blood, cytogenetics normal - Intermediate risk disease.  NGS Panel +FLT3 ITD (0.38), DNMT3A, IDH1, BCOR.   ECHO normal EF  PICC Line placed  No evidence of TLS - allopurinol 300mg daily + IVF; monitor daily   No evidence of DIC - monitor labs daily  Proph Levaquin, Valacyclovir, Posaconazole  Transfuse to maintain Hgb >7 and PLT >10 -- no transfusions today.    Cytarabine + Daunorubicin + Midostaurin - Day 17 today.  Overall tolerating well.  No signs or symptoms of TLS.  EKG at 48hrs to monitor QTc stable, repeat at 1 week stable.   Bone marrow biopsy at day 21    Chemotherapy induced anemia/thrombocytopenia - transfuse to maintain hgb >7 and PLT >10.     Neutropenic fever (3/9/2023)  U/a negative, CXR normal, Blood culture NGTD  Cefepime -> Zosyn - completed 7 days. Now back on Levaquin ppx. Remains afebrile.  If spikes fever, repeat infectious workup and resume broad spectrum antibiotics  Clinically asymptomatic    Constipation  Resolved  Hold laxatives  Stool softener once diarrhea resolves  If diarrhea is persistent, check c.diff. No obvious concern for C. Diff at this time -- 1-2 soft brown stools, not watery.     Hemorrhoid pain  -- rectal area inspected today. Noted inflammed external hemorrhoids -- mild erythema, no obvious evidence of sores/abscess, and no open sores.  - Anusol, lidocaine gel TOPICAL only (nothing inserted per rectum)  - Sitz baths  - Stool softener    Fluid overload - improved.  IVF discontinued. Lasix  prn.    LUE swelling - LUE doppler with thrombophlebitis.  Resolved    GERD - PPI at bedtime, tums prn    PT/OT to evaluate/treat    No DVT proph due to severe thrombocytopenia (<50)    Rest per resident note.    Estephania Otoole MD  Hematology/Oncology  [facility]                 Unknown if ever smoked

## 2023-04-04 ENCOUNTER — APPOINTMENT (OUTPATIENT)
Dept: ENDOCRINOLOGY | Facility: CLINIC | Age: 63
End: 2023-04-04
Payer: MEDICAID

## 2023-04-04 VITALS
DIASTOLIC BLOOD PRESSURE: 78 MMHG | BODY MASS INDEX: 33.04 KG/M2 | WEIGHT: 175 LBS | SYSTOLIC BLOOD PRESSURE: 118 MMHG | HEIGHT: 61 IN

## 2023-04-04 PROCEDURE — 99214 OFFICE O/P EST MOD 30 MIN: CPT

## 2023-04-04 NOTE — HISTORY OF PRESENT ILLNESS
[FreeTextEntry1] : Pt lost to follow up since Nov 2021\par \par On steroids- chronic\par \par T2DM- for 20 years -first had GDM \par \par SMBG\par Checks BS once a day fasting\par On average 70s-90s \par \par Need updated HGA1C- on recall 6.0- 1/2023\par \par Current drug regimen\par  mg ER daily \par \par Diabetes is controlled by her PCP- [facility] \par \par HTN\par BP in office 118/78\par Metoprolol 25 mg BID\par Enalapril 10 mg daily \par \par \par Vit D Def\par Need updated levels on labs- last labs 25.5 12/2022\par Controlled by nephrology \par \par \par Hypercalcemia- 1-25(OH)2 vitamin D mediated - from Sarcoidosis \par 12/2022 PTH 15, Calcium 10.6 \par -Controlled by PCP \par \par S/P 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

## 2023-04-04 NOTE — ASSESSMENT
[FreeTextEntry1] : Pt has multiple modalities controlled by PCP and nephrology\par Will obtain labs from [facility]\par T2DM\par -Controlled by PCP- well controlled on 1 tab of MFN daily, continue regimen\par \par HTN\par -BP stable in office, continue aceI and beta blocker\par \par HLD\par -Continue statin\par \par Vit D Def/Hypercalcemia\par -Continue plan as per nephrology\par \par Mulitple thyroid nodules\par -Sonogram rx given for annual thyroid sonogram (overdue)\par \par RTO 6 months with Dr. iMller

## 2023-04-06 ENCOUNTER — APPOINTMENT (OUTPATIENT)
Dept: NEPHROLOGY | Facility: CLINIC | Age: 63
End: 2023-04-06
Payer: MEDICAID

## 2023-04-06 VITALS
WEIGHT: 175 LBS | OXYGEN SATURATION: 99 % | SYSTOLIC BLOOD PRESSURE: 136 MMHG | BODY MASS INDEX: 33.04 KG/M2 | HEIGHT: 61 IN | DIASTOLIC BLOOD PRESSURE: 82 MMHG | HEART RATE: 100 BPM

## 2023-04-06 PROCEDURE — 99214 OFFICE O/P EST MOD 30 MIN: CPT

## 2023-04-06 NOTE — REVIEW OF SYSTEMS
[Negative] : Respiratory [Fever] : no fever [Chills] : no chills [Feeling Tired] : not feeling tired [Eye Pain] : no eye pain [Red Eyes] : eyes not red [Earache] : no earache [Loss Of Hearing] : no hearing loss [Heart Rate Is Slow] : the heart rate was not slow [Heart Rate Is Fast] : the heart rate was not fast [Lower Ext Edema] : no lower extremity edema [Shortness Of Breath] : no shortness of breath [Wheezing] : no wheezing [Cough] : no cough [SOB on Exertion] : no shortness of breath during exertion [Abdominal Pain] : no abdominal pain [Constipation] : no constipation [Diarrhea] : no diarrhea [Dysuria] : no dysuria [Arthralgias] : no arthralgias [Confused] : no confusion [Convulsions] : no convulsions [Dizziness] : no dizziness [Suicidal] : not suicidal [Sleep Disturbances] : no sleep disturbances [Anxiety] : no anxiety [Muscle Weakness] : no muscle weakness [Easy Bleeding] : no tendency for easy bleeding [Easy Bruising] : no tendency for easy bruising [de-identified] : psoriasis; LE lesions

## 2023-04-06 NOTE — ASSESSMENT
[FreeTextEntry1] : CKD III in the setting of HTN, DM, Gout, NSAID use \par Scr now 1.07- stable\par [place]-\par CT with  Less than 3 mm calculus mid pole region right kidney, nonobstructive. Less than 3 mm calculus lower pole region left kidney, nonobstructive. No evidence for bilateral hydronephrosis. Subcentimeter \par hypodense focus mid pole right kidney, too small to characterize. \par \par \par Hypercalcemia\par 1-25(OH)2 vitamin D mediated - from Sarcoidosis \par Previously on high dose vitamin D which has been stopped. She is currently on prednisone 2.5 mg daily\par SCa++ remains stable at 10.6\par She  has had multiple rebounds of sarcoid with SHORT tapers;  prolonging therapy at this dose.\par Currently on prednisone 2.5 mg daily; Lasix 20 mg qod  for hypercalciuric effect\par Avoid NSAIDs\par \par \par Foot pain- ? Gout\par Will give prednisone 40 mg daily for 5 days then resume 2.5 mg daily\par ok to resume Probenecid now that kidney function has stabilized\par \par RTC in 3 months\par \par \par \par \par \par \par \par \par

## 2023-04-06 NOTE — HISTORY OF PRESENT ILLNESS
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

## 2023-04-06 NOTE — PHYSICAL EXAM
[General Appearance - Alert] : alert [General Appearance - In No Acute Distress] : in no acute distress [Sclera] : the sclera and conjunctiva were normal [Strabismus] : no strabismus was seen [Outer Ear] : the ears and nose were normal in appearance [Hearing Threshold Finger Rub Not [place]] : hearing was normal [Neck Appearance] : the appearance of the neck was normal [] : no respiratory distress [Respiration, Rhythm And Depth] : normal respiratory rhythm and effort [Auscultation Breath Sounds / Voice Sounds] : lungs were clear to auscultation bilaterally [Heart Rate And Rhythm] : heart rate was normal and rhythm regular [Heart Sounds] : normal S1 and S2 [Heart Sounds Gallop] : no gallops [Edema] : there was no peripheral edema [Abdomen Soft] : soft [Abdomen Tenderness] : non-tender [No CVA Tenderness] : no ~M costovertebral angle tenderness [Abnormal Walk] : normal gait [No Focal Deficits] : no focal deficits [Oriented To Time, Place, And Person] : oriented to person, place, and time [FreeTextEntry1] : psoriatic rash on both legs+

## 2023-04-17 ENCOUNTER — APPOINTMENT (OUTPATIENT)
Dept: ORTHOPEDIC SURGERY | Facility: CLINIC | Age: 63
End: 2023-04-17
Payer: MEDICAID

## 2023-04-17 ENCOUNTER — APPOINTMENT (OUTPATIENT)
Dept: ULTRASOUND IMAGING | Facility: CLINIC | Age: 63
End: 2023-04-17

## 2023-04-17 VITALS
SYSTOLIC BLOOD PRESSURE: 120 MMHG | HEART RATE: 81 BPM | WEIGHT: 175 LBS | HEIGHT: 61 IN | BODY MASS INDEX: 33.04 KG/M2 | DIASTOLIC BLOOD PRESSURE: 79 MMHG

## 2023-04-17 DIAGNOSIS — M25.522 PAIN IN LEFT ELBOW: ICD-10-CM

## 2023-04-17 PROCEDURE — 73080 X-RAY EXAM OF ELBOW: CPT | Mod: LT

## 2023-04-17 PROCEDURE — 99213 OFFICE O/P EST LOW 20 MIN: CPT | Mod: 24

## 2023-04-17 NOTE — REASON FOR VISIT
[Initial Visit] : an initial visit for [[employer] ] : provided by [employer]   [FreeTextEntry2] : Left elbow pain  [Interpreters_IDNumber] : [id number] [Interpreters_FullName] : Wyatt [TWNoteComboBox1] : [place]

## 2023-04-17 NOTE — HISTORY OF PRESENT ILLNESS
[Worsening] : worsening [Intermit.] : ~He/She~ states the symptoms seem to be intermittent [Direct Pressure] : worsened by direct pressure [Lifting] : worsened by lifting [Rest] : relieved by rest [de-identified] : 04/17/2023 : RADHA BRAND  is a 63 year  old female who presents to the office for follow-up evaluation of her left elbow today.  She states that since last office visit her elbow pain has nearly completely resolved.  She states she gets occasional aches and pains in the elbow but is otherwise doing very well.  She has minimal complaints today and is here for routine follow-up today.  She denies any numbness or tingling distally.  She has no other complaints today.\par \par 02/16/2023 : RADHA BRAND  is a 63 year  old female who presents to the office for follow-up evaluation of the left elbow.  She states that since her last office visit she has had significant improvement in her pain and no longer has any significant pain in her elbow.  She states she gets mild comfort when she leans on the elbow but does not have any significant pain in the elbow and states her pain has significantly improved since her last office visit.  She states the pain is very mild.  She states that she is doing very well and is here for routine follow-up today.  She denies any numbness or tingling distally.  She has no other complaints today.\par \par 01/19/2023 : RADHA BRAND  is a 62 year  old female who presents to the office for evaluation of her left elbow.  This visit was performed with the use of a .  She states on the 14th or 15 January she banged her elbow twice and after that has had pain in the elbow.  On the 16th she went to the hospital and had x-rays taken and they said there was a possible small fracture and placed her in a splint and sling and advised her to follow-up with an orthopedic.  She is here for specialist opinion today.  She localized the pain to the lateral aspect of the elbow and states it is worse with movement and activity  and alleviated with rest.  She states the pain can be sharp with movement and has gotten mildly better since the injury.  She is here for specialist opinion today.  She denies any numbness or tingling distally.  She has no other complaints today.

## 2023-04-17 NOTE — DISCUSSION/SUMMARY
[de-identified] : Assessment: 63-year-old female with left elbow radial head fracture, healed\par \par Plan: I had a long discussion with the patient today regarding the nature of their diagnosis and treatment plan. We discussed the risks and benefits of no treatment as well as nonoperative and operative treatments. I reviewed the x-rays today which revealed a subtle nondisplaced  Radial head fracture of the left elbow and no other acute findings, well healed.  At this time she can gradually progress activities as tolerated.  She should avoid exacerbating activities and gradually increase her activities with progressive strengthening.  She was offered a physical therapy prescription but declined and states she will do self at home on her own.  She will follow-up on an as-needed basis if symptoms persist or worsen despite continued conservative treatment.  She understands and agrees and all questions were answered.  Patient discussed and reviewed with Dr. Sim today.\par \par The patient verbalizes their understanding and agrees with the plan.  All questions were answered to their satisfaction.

## 2023-04-17 NOTE — PHYSICAL EXAM
[de-identified] : General:\par Awake, alert, no acute distress, Patient was cooperative and appropriate during the examination.\par \par The patient is of normal weight for height and age.\par \par Walks without an antalgic gait.\par \par Full, painless range of motion of the neck and back.\par \par Exam of the bilateral lower extremities is intact and symmetric with regards to dermatologic, vascular, and neurologic exam. Bilateral lower extremity sensation is grossly intact to light touch in the DP/SP/T/S/S nerve distributions. Intact DF/PF/EHL. BIlateral lower extremities warm and well-perfused with brisk capillary refill.\par \par \par Pulmonary:\par Regular, nonlabored breathing\par \par Abdomen:\par Soft, nontender, nondistended.\par \par Lymphatic:\par No evidence of axillary lymphadenopathy\par \par Left Elbow Exam:\par Physical exam of the elbow demonstrates normal skin without signs of skin changes or abnormalities. No erythema, warmth, or joint effusion appreciated. \par  \par Sensation intact light touch C5-T1\par Palpable radial pulse\par Radial/ulnar/median/axillary/musculocutaneous/AIN/PIN nerves grossly intact\par  \par Range of motion:\par Flexion-Extension 0-130\par Pronation-Supination 85-85\par  \par Palpation:\par Not tender to palpation over the lateral epicondyle\par Not tender palpation over the medial epicondyle\par Minimally tender to palpation over the radial head\par Not tender to palpation over the olecranon\par Not tender to palpation over the distal biceps insertion\par Not tender to palpation over the distal triceps insertion\par Not tender to palpation over the cubital tunnel\par  \par Strength testing:\par Pronation/supination: 5/5\par Flexion/extension: 5/5\par  \par Special Tests:\par No varus/valgus laxity at 0 and 30 degrees of elbow flexion\par No pain with resisted wrist/finger extension and forearm supination\par No pain with resisted wrist/finger flexion and forearm pronation\par Negative hook test\par Negative Tinel's over the carpal and cubital tunnels\par   [de-identified] : X-rays 3 views of the left elbow taken today in the office on 4/17/2023 shows a well-healed radial head fracture with no new acute findings.\par \par X-rays 3 views of the left elbow taken in the office today on 2/16/2023 reveals a well-healing radial head fracture with callus formation and consolidation noted, well-healing and appropriate alignment with joint space is well-maintained.\par \par X-ray 1 radial head view of the elbow taken in the office today on 1/19/2023 shows a subtle nondisplaced radial head fracture in anatomic alignment without displacement of the superior extent of the articular margin.\par \par X-ray 3 views of the left elbow taken in the emergency room at [facility] on 1/16/2022 reveals no acute fracture dislocation with a posterior sail sign indicative of a possible occult radial head fracture

## 2023-05-01 ENCOUNTER — APPOINTMENT (OUTPATIENT)
Dept: ULTRASOUND IMAGING | Facility: CLINIC | Age: 63
End: 2023-05-01
Payer: MEDICAID

## 2023-05-01 ENCOUNTER — OUTPATIENT (OUTPATIENT)
Dept: OUTPATIENT SERVICES | Facility: HOSPITAL | Age: 63
LOS: 1 days | End: 2023-05-01

## 2023-05-01 DIAGNOSIS — Z00.8 ENCOUNTER FOR OTHER GENERAL EXAMINATION: ICD-10-CM

## 2023-05-01 PROCEDURE — 76536 US EXAM OF HEAD AND NECK: CPT | Mod: 26

## 2023-05-11 ENCOUNTER — NON-APPOINTMENT (OUTPATIENT)
Age: 63
End: 2023-05-11

## 2023-05-11 ENCOUNTER — APPOINTMENT (OUTPATIENT)
Dept: GASTROENTEROLOGY | Facility: CLINIC | Age: 63
End: 2023-05-11
Payer: MEDICAID

## 2023-05-11 VITALS
HEIGHT: 61 IN | SYSTOLIC BLOOD PRESSURE: 125 MMHG | RESPIRATION RATE: 16 BRPM | BODY MASS INDEX: 33.04 KG/M2 | WEIGHT: 175 LBS | OXYGEN SATURATION: 98 % | DIASTOLIC BLOOD PRESSURE: 80 MMHG | HEART RATE: 80 BPM

## 2023-05-11 DIAGNOSIS — Z12.11 ENCOUNTER FOR SCREENING FOR MALIGNANT NEOPLASM OF COLON: ICD-10-CM

## 2023-05-11 DIAGNOSIS — R16.0 HEPATOMEGALY, NOT ELSEWHERE CLASSIFIED: ICD-10-CM

## 2023-05-11 PROCEDURE — 99204 OFFICE O/P NEW MOD 45 MIN: CPT

## 2023-05-11 NOTE — PHYSICAL EXAM

## 2023-05-11 NOTE — ASSESSMENT
Nose:  Saline first 1-2 times per day    Arm and hammer simply saline is the easiest             Next azelastine - this is an intranasal antihistamine. This is for congestion and post nasal drip. You may use this as needed.  1 spray per nare twice per day - if symptoms worsen, may use up to 4 times per day or every 6 hours. May cause dryness. If not sprayed correctly, may have bitter taste.          Then use your intranasal steroid spray. This may include fluticasone/flonase or Budesonide aqua/rhinocort, or similar, 1 spray per nare twice per day. For worse symptoms , increase to 2 sprays per nare twice per day x 2 weeks, then see if you can decrease back to 1 spray per nare twice per day , or 2 sprays per nare daily. MUST be used EVERYDAY for at least 2 weeks, or this will NOT be effective.     Nasocort is fine :o)            Nasal spray Technique:  Head down to help prevent taste.   Aim the nasal spray tip up past the turbinates and out towards the outer corner of the eye.   Spray don't sniff  Let it drip out the front of the nose.  Pat dry and done.       STOP symbicort for now.   Keep it on hand.     If you feel chest heaviness, shortness of breath, or worsening cough, take 2 puffs. Brush teeth after use.     Start pepcid 20 mg twice per day. 30 mins before breakfast and dinner.     Under the arms, continue davey's deodorant.   Start traimcinolone twice per day until resolution.     Follow up in 6 weeks, sooner if needed          [FreeTextEntry1] : Patient is a 63 year female, with PMH DMII, HTN, HLD, CKD III, gout, moderate to severe WALTER, sarcoidosis, who presents for colon cancer screening. Patient had a colonoscopy about 11 years ago which was normal, per pt. Patient denies a family h/o colon polyps or colon cancer. \par \par Patient overall feeling well, asymptomatic. She was referred by PCP for "nodular liver" but imaging that I have to review showed scattered parenchymal calcifications without hepatomegaly or masses. Patient had a PET CT in July 2022 which showed normal liver. \par - Will order MRI abdomen to further evaluate liver\par \par Colonoscopy to be scheduled. I have discussed the indications, risks and benefits of procedure with patient. Risks include, but not limited to, bleeding, perforation, infection, and reaction to anesthesia. Alternatives to colonoscopy discussed with patient. Patient was given the opportunity to ask questions, all questions were answered. The patient agrees to proceed with colonoscopy. Patient is medically optimized for colonoscopy. \par \par Gavilyte prep to be used. Bowel prep instructions discussed at length. \par \par

## 2023-05-11 NOTE — ASSESSMENT
[FreeTextEntry1] : Patient is a 63 year female, with PMH DMII, HTN, HLD, CKD III, gout, moderate to severe WALTER, sarcoidosis, who presents for colon cancer screening. Patient had a colonoscopy about 11 years ago which was normal, per pt. Patient denies a family h/o colon polyps or colon cancer. \par \par Patient overall feeling well, asymptomatic. She was referred by PCP for "nodular liver" but imaging that I have to review showed scattered parenchymal calcifications without hepatomegaly or masses. Patient had a PET CT in July 2022 which showed normal liver. \par - Will order MRI abdomen to further evaluate liver\par \par Colonoscopy to be scheduled. I have discussed the indications, risks and benefits of procedure with patient. Risks include, but not limited to, bleeding, perforation, infection, and reaction to anesthesia. Alternatives to colonoscopy discussed with patient. Patient was given the opportunity to ask questions, all questions were answered. The patient agrees to proceed with colonoscopy. Patient is medically optimized for colonoscopy. \par \par Gavilyte prep to be used. Bowel prep instructions discussed at length. \par \par

## 2023-05-11 NOTE — HISTORY OF PRESENT ILLNESS
[FreeTextEntry1] : Patient is a 63 year female, with PMH DMII, HTN, HLD, CKD III, gout, moderate to severe WALTER, sarcoidosis, who presents for colon cancer screening. \par \par Patient had a colonoscopy about 11 years ago which was normal, per pt. Patient denies a family h/o colon polyps or colon cancer. \par \par Patient overall feeling well, asymptomatic. She was referred by PCP for "nodular liver" but imaging that I have to review showed scattered parenchymal calcifications without hepatomegaly or masses. Patient had a PET CT in July 2022 which showed normal liver. \par \par Patient denies pyrosis, dysphagia, abdominal pain, change in BMs, rectal bleeding, nausea, vomiting, or unexplained weight loss. \par \par Patient denies any significant cardiac or pulmonary conditions.\par \par Recent labs done by PCP. LFTs were normal in March 2023. \par

## 2023-05-11 NOTE — PHYSICAL EXAM
· Currently at baseline  · Delirium precautions [Alert] : alert [Normal Voice/Communication] : normal voice/communication [Healthy Appearing] : healthy appearing [No Acute Distress] : no acute distress [Sclera] : the sclera and conjunctiva were normal [Hearing Threshold Finger Rub Not Twiggs] : hearing was normal [Normal Lips/Gums] : the lips and gums were normal [Oropharynx] : the oropharynx was normal [Normal Appearance] : the appearance of the neck was normal [No Neck Mass] : no neck mass was observed [No Respiratory Distress] : no respiratory distress [No Acc Muscle Use] : no accessory muscle use [Respiration, Rhythm And Depth] : normal respiratory rhythm and effort [Auscultation Breath Sounds / Voice Sounds] : lungs were clear to auscultation bilaterally [Heart Rate And Rhythm] : heart rate was normal and rhythm regular [Normal S1, S2] : normal S1 and S2 [Murmurs] : no murmurs [Bowel Sounds] : normal bowel sounds [Abdomen Tenderness] : non-tender [No Masses] : no abdominal mass palpated [Abdomen Soft] : soft [] : no hepatosplenomegaly [Oriented To Time, Place, And Person] : oriented to person, place, and time

## 2023-05-15 ENCOUNTER — EMERGENCY (EMERGENCY)
Facility: HOSPITAL | Age: 63
LOS: 1 days | Discharge: HOME | End: 2023-05-15
Attending: EMERGENCY MEDICINE
Payer: COMMERCIAL

## 2023-05-15 VITALS
HEART RATE: 99 BPM | OXYGEN SATURATION: 99 % | DIASTOLIC BLOOD PRESSURE: 83 MMHG | SYSTOLIC BLOOD PRESSURE: 149 MMHG | WEIGHT: 175.05 LBS | HEIGHT: 63 IN | RESPIRATION RATE: 18 BRPM | TEMPERATURE: 98 F

## 2023-05-15 PROCEDURE — 73610 X-RAY EXAM OF ANKLE: CPT | Mod: 26,RT

## 2023-05-15 PROCEDURE — 73630 X-RAY EXAM OF FOOT: CPT

## 2023-05-15 PROCEDURE — 99283 EMERGENCY DEPT VISIT LOW MDM: CPT

## 2023-05-15 PROCEDURE — 99284 EMERGENCY DEPT VISIT MOD MDM: CPT

## 2023-05-15 PROCEDURE — 73610 X-RAY EXAM OF ANKLE: CPT

## 2023-05-15 PROCEDURE — 73630 X-RAY EXAM OF FOOT: CPT | Mod: 26,RT

## 2023-05-15 RX ORDER — ACETAMINOPHEN 500 MG
650 TABLET ORAL ONCE
Refills: 0 | Status: COMPLETED | OUTPATIENT
Start: 2023-05-15 | End: 2023-05-15

## 2023-05-15 RX ADMIN — Medication 650 MILLIGRAM(S): at 23:43

## 2023-05-15 NOTE — ED PROVIDER NOTE - PROGRESS NOTE DETAILS
xray without noted fracture or dislocation+ arthritis.   wrapped in ac. DC with fu with established podiatrist   instructed on tylenol use for pain control as well as elevation of foot

## 2023-05-15 NOTE — ED PROVIDER NOTE - PATIENT PORTAL LINK FT
You can access the FollowMyHealth Patient Portal offered by [facility] by registering at the following website: [email or web address]. By joining [employer]’s FollowMyHealth portal, you will also be able to view your health information using other applications (apps) compatible with our system.

## 2023-05-15 NOTE — ED PROVIDER NOTE - ATTENDING APP SHARED VISIT CONTRIBUTION OF CARE
64 yo c/o r foot pain and swelling similar to prior gout flares.  Pt recently finished course of po steroids by her Podiatrist.  On  exam R foot with mild swelling, erythema and warmth.  x-ray neg.  Rx Ace wrap for compression, Tylenol with f/u PMD Podiatrist next 1-2 day

## 2023-05-15 NOTE — ED PROVIDER NOTE - CLINICAL SUMMARY MEDICAL DECISION MAKING FREE TEXT BOX
female hx of arthritis and gout on recent steroids for gout flare by podiatrist, presenting to the ED with atraumatic right ankle and foot swelling, no erythema ecchymosis, no bony tenderness, ROM intact. will give tylenol, can not take NSAIDs or allopurinol or colchicine for gout. xray to r/o bony pathology. dc with outpt fu with podiatrist that she has established care with

## 2023-05-16 NOTE — ED ADULT NURSE NOTE - NSFALLUNIVINTERV_ED_ALL_ED
Bed/Stretcher in lowest position, wheels locked, appropriate side rails in place/Call bell, personal items and telephone in reach/Instruct patient to call for assistance before getting out of bed/chair/stretcher/Non-slip footwear applied when patient is off stretcher/[place] to call system/Physically safe environment - no spills, clutter or unnecessary equipment/Purposeful proactive rounding/Room/bathroom lighting operational, light cord in reach

## 2023-06-05 ENCOUNTER — TRANSCRIPTION ENCOUNTER (OUTPATIENT)
Age: 63
End: 2023-06-05

## 2023-06-06 ENCOUNTER — APPOINTMENT (OUTPATIENT)
Dept: GASTROENTEROLOGY | Facility: HOSPITAL | Age: 63
End: 2023-06-06

## 2023-06-06 ENCOUNTER — OUTPATIENT (OUTPATIENT)
Dept: OUTPATIENT SERVICES | Facility: HOSPITAL | Age: 63
LOS: 1 days | End: 2023-06-06
Payer: COMMERCIAL

## 2023-06-06 DIAGNOSIS — Z12.11 ENCOUNTER FOR SCREENING FOR MALIGNANT NEOPLASM OF COLON: ICD-10-CM

## 2023-06-06 LAB — GLUCOSE BLDC GLUCOMTR-MCNC: 83 MG/DL — SIGNIFICANT CHANGE UP (ref 70–99)

## 2023-06-06 PROCEDURE — 45378 DIAGNOSTIC COLONOSCOPY: CPT

## 2023-06-06 PROCEDURE — 82962 GLUCOSE BLOOD TEST: CPT

## 2023-06-06 PROCEDURE — G0121: CPT

## 2023-06-06 DEVICE — NAIL OSTEO 1.5X16MM STRL: Type: IMPLANTABLE DEVICE | Status: FUNCTIONAL

## 2023-06-24 ENCOUNTER — EMERGENCY (EMERGENCY)
Facility: HOSPITAL | Age: 63
LOS: 1 days | Discharge: HOME | End: 2023-06-24
Attending: STUDENT IN AN ORGANIZED HEALTH CARE EDUCATION/TRAINING PROGRAM
Payer: COMMERCIAL

## 2023-06-24 VITALS
WEIGHT: 175.05 LBS | SYSTOLIC BLOOD PRESSURE: 165 MMHG | RESPIRATION RATE: 20 BRPM | TEMPERATURE: 98 F | HEART RATE: 125 BPM | DIASTOLIC BLOOD PRESSURE: 99 MMHG | HEIGHT: 63 IN | OXYGEN SATURATION: 98 %

## 2023-06-24 LAB
ALBUMIN SERPL ELPH-MCNC: 4.1 G/DL — SIGNIFICANT CHANGE UP (ref 3.3–5.2)
ALP SERPL-CCNC: 54 U/L — SIGNIFICANT CHANGE UP (ref 40–120)
ALT FLD-CCNC: 13 U/L — SIGNIFICANT CHANGE UP
ANION GAP SERPL CALC-SCNC: 15 MMOL/L — SIGNIFICANT CHANGE UP (ref 5–17)
APTT BLD: 35 SEC — SIGNIFICANT CHANGE UP (ref 27.5–35.5)
AST SERPL-CCNC: 17 U/L — SIGNIFICANT CHANGE UP
BASOPHILS # BLD AUTO: 0.02 K/UL — SIGNIFICANT CHANGE UP (ref 0–0.2)
BASOPHILS NFR BLD AUTO: 0.4 % — SIGNIFICANT CHANGE UP (ref 0–2)
BILIRUB SERPL-MCNC: 0.4 MG/DL — SIGNIFICANT CHANGE UP (ref 0.4–2)
BUN SERPL-MCNC: 27.4 MG/DL — HIGH (ref 8–20)
CALCIUM SERPL-MCNC: 10.3 MG/DL — SIGNIFICANT CHANGE UP (ref 8.4–10.5)
CHLORIDE SERPL-SCNC: 99 MMOL/L — SIGNIFICANT CHANGE UP (ref 96–108)
CK SERPL-CCNC: 50 U/L — SIGNIFICANT CHANGE UP (ref 25–170)
CO2 SERPL-SCNC: 23 MMOL/L — SIGNIFICANT CHANGE UP (ref 22–29)
CREAT SERPL-MCNC: 1.2 MG/DL — SIGNIFICANT CHANGE UP (ref 0.5–1.3)
EGFR: 51 ML/MIN/1.73M2 — LOW
EOSINOPHIL # BLD AUTO: 0.17 K/UL — SIGNIFICANT CHANGE UP (ref 0–0.5)
EOSINOPHIL NFR BLD AUTO: 3.7 % — SIGNIFICANT CHANGE UP (ref 0–6)
GLUCOSE SERPL-MCNC: 117 MG/DL — HIGH (ref 70–99)
HCT VFR BLD CALC: 33.8 % — LOW (ref 34.5–45)
HGB BLD-MCNC: 10.8 G/DL — LOW (ref 11.5–15.5)
IMM GRANULOCYTES NFR BLD AUTO: 0.4 % — SIGNIFICANT CHANGE UP (ref 0–0.9)
INR BLD: 1.19 RATIO — HIGH (ref 0.88–1.16)
LYMPHOCYTES # BLD AUTO: 0.77 K/UL — LOW (ref 1–3.3)
LYMPHOCYTES # BLD AUTO: 16.7 % — SIGNIFICANT CHANGE UP (ref 13–44)
MCHC RBC-ENTMCNC: 25.4 PG — LOW (ref 27–34)
MCHC RBC-ENTMCNC: 32 GM/DL — SIGNIFICANT CHANGE UP (ref 32–36)
MCV RBC AUTO: 79.5 FL — LOW (ref 80–100)
MONOCYTES # BLD AUTO: 0.37 K/UL — SIGNIFICANT CHANGE UP (ref 0–0.9)
MONOCYTES NFR BLD AUTO: 8 % — SIGNIFICANT CHANGE UP (ref 2–14)
NEUTROPHILS # BLD AUTO: 3.26 K/UL — SIGNIFICANT CHANGE UP (ref 1.8–7.4)
NEUTROPHILS NFR BLD AUTO: 70.8 % — SIGNIFICANT CHANGE UP (ref 43–77)
NT-PROBNP SERPL-SCNC: 99 PG/ML — SIGNIFICANT CHANGE UP (ref 0–300)
PLATELET # BLD AUTO: 231 K/UL — SIGNIFICANT CHANGE UP (ref 150–400)
POTASSIUM SERPL-MCNC: 3.5 MMOL/L — SIGNIFICANT CHANGE UP (ref 3.5–5.3)
POTASSIUM SERPL-SCNC: 3.5 MMOL/L — SIGNIFICANT CHANGE UP (ref 3.5–5.3)
PROT SERPL-MCNC: 7.4 G/DL — SIGNIFICANT CHANGE UP (ref 6.6–8.7)
PROTHROM AB SERPL-ACNC: 13.8 SEC — HIGH (ref 10.5–13.4)
RBC # BLD: 4.25 M/UL — SIGNIFICANT CHANGE UP (ref 3.8–5.2)
RBC # FLD: 15.8 % — HIGH (ref 10.3–14.5)
SODIUM SERPL-SCNC: 137 MMOL/L — SIGNIFICANT CHANGE UP (ref 135–145)
TROPONIN T SERPL-MCNC: <0.01 NG/ML — SIGNIFICANT CHANGE UP (ref 0–0.06)
WBC # BLD: 4.61 K/UL — SIGNIFICANT CHANGE UP (ref 3.8–10.5)
WBC # FLD AUTO: 4.61 K/UL — SIGNIFICANT CHANGE UP (ref 3.8–10.5)

## 2023-06-24 PROCEDURE — 93010 ELECTROCARDIOGRAM REPORT: CPT

## 2023-06-24 PROCEDURE — 93971 EXTREMITY STUDY: CPT | Mod: 26,LT

## 2023-06-24 PROCEDURE — 71045 X-RAY EXAM CHEST 1 VIEW: CPT | Mod: 26

## 2023-06-24 PROCEDURE — 99285 EMERGENCY DEPT VISIT HI MDM: CPT

## 2023-06-24 PROCEDURE — 71275 CT ANGIOGRAPHY CHEST: CPT | Mod: 26,MA

## 2023-06-24 RX ORDER — HEPARIN SODIUM 5000 [USP'U]/ML
6500 INJECTION INTRAVENOUS; SUBCUTANEOUS ONCE
Refills: 0 | Status: COMPLETED | OUTPATIENT
Start: 2023-06-24 | End: 2023-06-24

## 2023-06-24 RX ORDER — HEPARIN SODIUM 5000 [USP'U]/ML
3000 INJECTION INTRAVENOUS; SUBCUTANEOUS EVERY 6 HOURS
Refills: 0 | Status: DISCONTINUED | OUTPATIENT
Start: 2023-06-24 | End: 2023-06-25

## 2023-06-24 RX ORDER — HEPARIN SODIUM 5000 [USP'U]/ML
INJECTION INTRAVENOUS; SUBCUTANEOUS
Qty: 25000 | Refills: 0 | Status: DISCONTINUED | OUTPATIENT
Start: 2023-06-24 | End: 2023-06-25

## 2023-06-24 RX ORDER — HEPARIN SODIUM 5000 [USP'U]/ML
6500 INJECTION INTRAVENOUS; SUBCUTANEOUS EVERY 6 HOURS
Refills: 0 | Status: DISCONTINUED | OUTPATIENT
Start: 2023-06-24 | End: 2023-06-25

## 2023-06-24 RX ADMIN — HEPARIN SODIUM 6500 UNIT(S): 5000 INJECTION INTRAVENOUS; SUBCUTANEOUS at 20:18

## 2023-06-24 RX ADMIN — HEPARIN SODIUM 1500 UNIT(S)/HR: 5000 INJECTION INTRAVENOUS; SUBCUTANEOUS at 20:17

## 2023-06-24 NOTE — ED PROVIDER NOTE - CLINICAL SUMMARY MEDICAL DECISION MAKING FREE TEXT BOX
Patient has EKG changes for Q waves and 2 and 3-lead.  Patient also tachycardic and has shortness of breath with leg swelling will rule out DVT and PE by ultrasound and CTA we will check labs to rule out ACS. we will also check proBNP and reassess

## 2023-06-24 NOTE — ED PROVIDER NOTE - IV ALTEPLASE DOOR HIDDEN
1) NO OVERT OROPHARYNGEAL SWALLOWING CONTRAINDICATIONS ARE EVIDENT FOR PATIENT TO BE ON A REGULAR TEXTURE DIET WITH THIN LIQUIDS AS SHE TOLERATES THESE FOOD CONSISTENCIES FROM AN OROPHARYNGEAL SWALLOWING STANCE ON EXAM.     2) NO NEED FOR SWALLOWING THERAPY BY A SPEECH PATHOLOGIST.     3) MEDICAL F/U AT DISCRETION OF DR WILKES. NOTE THAT PT'S PRANDIAL AIRWAY PROTECTION IS NORMAL AND I DO NOT FEEL THAT HER COMPLAINT OF EPISODIC EXERTIONAL DYSPNEA IS RELATED TO ANY OBSERVABLE OROPHARYNGEAL SWALLOWING PATHOLOGY.    4) RECONSULT NATHAN MORRIS MA, [facility]    , SPEECH PATHOLOGY     [phone] show

## 2023-06-24 NOTE — ED PROVIDER NOTE - PROGRESS NOTE DETAILS
I performed the initial face to face bedside interview with this patient regarding history of present illness, review of symptoms and past medical, social and family history.  I completed an independent physical examination.  I was the initial provider who evaluated this patient.  The history, review of symptoms and examination was documented by the scribe in my presence and I attest to the accuracy of the documentation.  I have signed out the follow up of any pending tests (i.e. labs, radiological studies) to the PA/ resident.  I have discussed the patient’s plan of care and disposition with the PA/ resident. Sriram: Patient w LLE DVT and shortness of breath. Started on heparin gtt. Now taken for CT PE study. Branden: Pt received in signout from Dr. Mcclain. Pt with stable VS, in no distress. Pt says she mainly came to the ED today because of left leg pain. Reports having intermittent chest pain and shortness of breath, which she attributes to gas pain. Workup showing DVT, no PE or other acute findings. EKG similar to previous. Pt agreeable to plan for discharge on Eliquis. Stable for discharge.

## 2023-06-24 NOTE — ED PROVIDER NOTE - PATIENT PORTAL LINK FT
You can access the FollowMyHealth Patient Portal offered by [facility] by registering at the following website: [email or web address]. By joining [employer]’s FollowMyHealth portal, you will also be able to view your health information using other applications (apps) compatible with our system.

## 2023-06-24 NOTE — ED PROVIDER NOTE - OBJECTIVE STATEMENT
63-year-old female with history of sarcoidosis hypertension arthritis diabetes presents with left leg and foot swelling and shortness of breath started today.  Patient says that she feels just tired and weak.  And never has experienced shortness of breath in the past patient denies any recent travel or sick contact.  Patient is not on any blood thinners and takes steroids for sarcoidosis.  Patient denies any fall or trauma

## 2023-06-24 NOTE — ED ADULT NURSE NOTE - NSFALLUNIVINTERV_ED_ALL_ED
Bed/Stretcher in lowest position, wheels locked, appropriate side rails in place/Call bell, personal items and telephone in reach/Instruct patient to call for assistance before getting out of bed/chair/stretcher/Non-slip footwear applied when patient is off stretcher/[place] to call system/Physically safe environment - no spills, clutter or unnecessary equipment/Purposeful proactive rounding/Room/bathroom lighting operational, light cord in reach

## 2023-06-24 NOTE — ED PROVIDER NOTE - NSFOLLOWUPINSTRUCTIONS_ED_ALL_ED_FT
- Please follow up with your primary care doctor  - Take blood thinner as prescribed  - Return to the emergency room for any new or worsening symptoms    ====================================    Deep Vein Thrombosis    Deep vein thrombosis (DVT) is a condition in which a blood clot forms in a vein of the deep venous system. This can occur in the lower leg, thigh, pelvis, arm, or neck. A clot is blood that has thickened into a gel or solid. This condition is serious and can be life-threatening if the clot travels to the arteries of the lungs and causes a blockage (pulmonary embolism). A DVT can also damage veins in the leg, which can lead to long-term venous disease, leg pain, swelling, discoloration, and ulcers or sores (post-thrombotic syndrome).    What are the causes?  This condition may be caused by:  A slowdown of blood flow.  Damage to a vein.  A condition that causes blood to clot more easily, such as certain bleeding disorders.  What increases the risk?  The following factors may make you more likely to develop this condition:  Obesity.  Being older, especially older than age 60.  Being inactive or not moving around (sedentary lifestyle). This may include:  Sitting or lying down for longer than 4–6 hours other than to sleep at night.  Being in the hospital, or having major or lengthy surgery.  Having any recent bone injuries, such as breaks (fractures), that reduce movement, especially in the lower extremities.  Having recent orthopedic surgery on the lower extremities.  Being pregnant, giving birth, or having recently given birth.  Taking medicines that contain estrogen, such as birth control or hormone replacement therapy.  Using products that contain nicotine or tobacco, especially if you use hormonal birth control.  Having a history of a blood vessel disease (peripheral vascular disease) or congestive heart disease.  Having a history of cancer, especially if being treated with chemotherapy.  What are the signs or symptoms?  Symptoms of this condition include:  Swelling, pain, pressure, or tenderness in an arm or a leg.  An arm or a leg becoming warm, red, or discolored.  A leg turning very pale or blue. You may have a large DVT. This is rare.  If the clot is in your leg, you may notice that symptoms get worse when you stand or walk.    In some cases, there are no symptoms.    How is this diagnosed?  This condition is diagnosed with:  Your medical history and a physical exam.  Tests, such as:  Blood tests to check how well your blood clots.  Doppler ultrasound. This is the best way to find a DVT.  CT venogram. Contrast dye is injected into a vein, and X-rays are taken to check for clots. This is helpful for veins in the chest or pelvis.  How is this treated?  Treatment for this condition depends on:  The cause of your DVT.  The size and location of your DVT, or having more than one DVT.  Your risk for bleeding or developing more clots.  Other medical conditions you may have.  Treatment may include:  Taking a blood thinner medicine (anticoagulant) to prevent more clots from forming or current clots from growing.  Wearing compression stockings.  Injecting medicines into the affected vein to break up the clot (catheter-directed thrombolysis).  Surgical procedures, when DVT is severe or hard to treat. These may be done to:  Isolate and remove your clot.  Place an inferior vena cava (IVC) filter. This filter is placed into a large vein called the inferior vena cava to catch blood clots before they reach your lungs.  You may get some medical treatments for 6 months or longer.    Follow these instructions at home:  If you are taking blood thinners:    Talk with your health care provider before you take any medicines that contain aspirin or NSAIDs, such as ibuprofen. These medicines increase your risk for dangerous bleeding.  Take your medicine exactly as told, at the same time every day. Do not skip a dose. Do not take more than the prescribed dose. This is important.  Ask your health care provider about foods and medicines that could change or interact with the way your blood thinner works. Avoid these foods and medicines if you are told to do so.  Avoid anything that may cause bleeding or bruising. You may bleed more easily while taking blood thinners.  Be very careful when using knives, scissors, or other sharp objects.  Use an electric razor instead of a blade.  Avoid activities that could cause injury or bruising, and follow instructions for preventing falls.  Tell your health care provider if you have had any internal bleeding, bleeding ulcers, or neurologic diseases, such as strokes or cerebral aneurysms.  Wear a medical alert bracelet or carry a card that lists what medicines you take.  General instructions    Take over-the-counter and prescription medicines only as told by your health care provider.  Return to your normal activities as told by your health care provider. Ask your health care provider what activities are safe for you.  If recommended, wear compression stockings as told by your health care provider. These stockings help to prevent blood clots and reduce swelling in your legs. Never wear your compression stockings while sleeping at night.  Keep all follow-up visits. This is important.  Where to find more information  American Heart Association: www.heart.org  Centers for Disease Control and Prevention: www.cdc.gov  National Heart, Lung, and Blood [place]: www.nhlbi.nih.gov  Contact a health care provider if:  You miss a dose of your blood thinner.  You have unusual bruising or other color changes.  You have new or worse pain, swelling, or redness in an arm or a leg.  You have worsening numbness or tingling in an arm or a leg.  You have a significant color change (pale or blue) in the extremity that has the DVT.  Get help right away if:  You have signs or symptoms that a blood clot has moved to the lungs. These may include:  Shortness of breath.  Chest pain.  Fast or irregular heartbeats (palpitations).  Light-headedness, dizziness, or fainting.  Coughing up blood.  You have signs or symptoms that your blood is too thin. These may include:  Blood in your vomit, stool, or urine.  A cut that will not stop bleeding.  A menstrual period that is heavier than usual.  A severe headache or confusion.  These symptoms may be an emergency. Get help right away. Call 911.  Do not wait to see if the symptoms will go away.  Do not drive yourself to the hospital.  Summary  Deep vein thrombosis (DVT) happens when a blood clot forms in a deep vein. This may occur in the lower leg, thigh, pelvis, arm, or neck.  Symptoms affect the arm or leg and can include swelling, pain, tenderness, warmth, redness, or discoloration.  This condition may be treated with medicines. In severe cases, a procedure or surgery may be done to remove or dissolve the clots.  If you are taking blood thinners, take them exactly as told. Do not skip a dose. Do not take more than is prescribed.  Get help right away if you have a severe headache, shortness of breath, chest pain, fast or irregular heartbeats, or blood in your vomit, urine, or stool.  This information is not intended to replace advice given to you by your health care provider. Make sure you discuss any questions you have with your health care provider.

## 2023-06-24 NOTE — ED PROVIDER NOTE - TOBACCO USE
----- Message from Isabelle Nicholson NP sent at 4/20/2021 10:38 AM CDT -----  Call pt parent with negative COVID test    Never smoker

## 2023-06-24 NOTE — ED PROVIDER NOTE - MUSCULOSKELETAL, MLM
Spine appears normal, range of motion is not limited, no muscle or joint tenderness, mild left foot swelling but no tenderness

## 2023-06-25 VITALS
DIASTOLIC BLOOD PRESSURE: 63 MMHG | SYSTOLIC BLOOD PRESSURE: 97 MMHG | TEMPERATURE: 98 F | RESPIRATION RATE: 17 BRPM | OXYGEN SATURATION: 99 % | HEART RATE: 82 BPM

## 2023-06-25 PROCEDURE — 82550 ASSAY OF CK (CPK): CPT

## 2023-06-25 PROCEDURE — 99285 EMERGENCY DEPT VISIT HI MDM: CPT | Mod: 25

## 2023-06-25 PROCEDURE — 96374 THER/PROPH/DIAG INJ IV PUSH: CPT | Mod: XU

## 2023-06-25 PROCEDURE — 83880 ASSAY OF NATRIURETIC PEPTIDE: CPT

## 2023-06-25 PROCEDURE — 85730 THROMBOPLASTIN TIME PARTIAL: CPT

## 2023-06-25 PROCEDURE — 93971 EXTREMITY STUDY: CPT

## 2023-06-25 PROCEDURE — 71275 CT ANGIOGRAPHY CHEST: CPT | Mod: MA

## 2023-06-25 PROCEDURE — 93005 ELECTROCARDIOGRAM TRACING: CPT

## 2023-06-25 PROCEDURE — 80053 COMPREHEN METABOLIC PANEL: CPT

## 2023-06-25 PROCEDURE — 84484 ASSAY OF TROPONIN QUANT: CPT

## 2023-06-25 PROCEDURE — 85025 COMPLETE CBC W/AUTO DIFF WBC: CPT

## 2023-06-25 PROCEDURE — 36415 COLL VENOUS BLD VENIPUNCTURE: CPT

## 2023-06-25 PROCEDURE — 85610 PROTHROMBIN TIME: CPT

## 2023-06-25 PROCEDURE — 71045 X-RAY EXAM CHEST 1 VIEW: CPT

## 2023-06-25 RX ORDER — APIXABAN 2.5 MG/1
10 TABLET, FILM COATED ORAL ONCE
Refills: 0 | Status: COMPLETED | OUTPATIENT
Start: 2023-06-25 | End: 2023-06-25

## 2023-06-25 RX ORDER — APIXABAN 2.5 MG/1
1 TABLET, FILM COATED ORAL
Qty: 1 | Refills: 0
Start: 2023-06-25

## 2023-06-25 RX ADMIN — APIXABAN 10 MILLIGRAM(S): 2.5 TABLET, FILM COATED ORAL at 01:32

## 2023-06-28 ENCOUNTER — APPOINTMENT (OUTPATIENT)
Dept: RHEUMATOLOGY | Facility: CLINIC | Age: 63
End: 2023-06-28
Payer: MEDICAID

## 2023-06-28 VITALS
DIASTOLIC BLOOD PRESSURE: 60 MMHG | OXYGEN SATURATION: 98 % | HEART RATE: 107 BPM | SYSTOLIC BLOOD PRESSURE: 122 MMHG | TEMPERATURE: 98 F

## 2023-06-28 DIAGNOSIS — L30.9 DERMATITIS, UNSPECIFIED: ICD-10-CM

## 2023-06-28 PROCEDURE — 99214 OFFICE O/P EST MOD 30 MIN: CPT

## 2023-06-28 RX ORDER — PREDNISONE 20 MG/1
20 TABLET ORAL
Qty: 10 | Refills: 0 | Status: DISCONTINUED | COMMUNITY
Start: 2023-04-09 | End: 2023-06-28

## 2023-06-28 RX ORDER — APIXABAN 5 MG/1
5 TABLET, FILM COATED ORAL
Qty: 60 | Refills: 2 | Status: ACTIVE | COMMUNITY

## 2023-06-28 RX ORDER — PREDNISONE 2.5 MG/1
2.5 TABLET ORAL DAILY
Qty: 90 | Refills: 1 | Status: DISCONTINUED | COMMUNITY
Start: 2020-07-14 | End: 2023-06-28

## 2023-06-29 ENCOUNTER — LABORATORY RESULT (OUTPATIENT)
Age: 63
End: 2023-06-29

## 2023-06-29 LAB — URATE SERPL-MCNC: 11 MG/DL

## 2023-07-01 ENCOUNTER — EMERGENCY (EMERGENCY)
Facility: HOSPITAL | Age: 63
LOS: 1 days | Discharge: HOME | End: 2023-07-01
Attending: STUDENT IN AN ORGANIZED HEALTH CARE EDUCATION/TRAINING PROGRAM
Payer: COMMERCIAL

## 2023-07-01 VITALS
HEART RATE: 90 BPM | HEIGHT: 63 IN | DIASTOLIC BLOOD PRESSURE: 67 MMHG | RESPIRATION RATE: 18 BRPM | SYSTOLIC BLOOD PRESSURE: 145 MMHG | WEIGHT: 176.15 LBS | TEMPERATURE: 98 F

## 2023-07-01 VITALS
TEMPERATURE: 98 F | DIASTOLIC BLOOD PRESSURE: 60 MMHG | OXYGEN SATURATION: 100 % | HEART RATE: 88 BPM | SYSTOLIC BLOOD PRESSURE: 130 MMHG | RESPIRATION RATE: 20 BRPM

## 2023-07-01 LAB
ALBUMIN SERPL ELPH-MCNC: 4.3 G/DL — SIGNIFICANT CHANGE UP (ref 3.3–5.2)
ALP SERPL-CCNC: 50 U/L — SIGNIFICANT CHANGE UP (ref 40–120)
ALT FLD-CCNC: 11 U/L — SIGNIFICANT CHANGE UP
ANION GAP SERPL CALC-SCNC: 13 MMOL/L — SIGNIFICANT CHANGE UP (ref 5–17)
AST SERPL-CCNC: 15 U/L — SIGNIFICANT CHANGE UP
BASOPHILS # BLD AUTO: 0.02 K/UL — SIGNIFICANT CHANGE UP (ref 0–0.2)
BASOPHILS NFR BLD AUTO: 0.5 % — SIGNIFICANT CHANGE UP (ref 0–2)
BILIRUB SERPL-MCNC: 0.4 MG/DL — SIGNIFICANT CHANGE UP (ref 0.4–2)
BUN SERPL-MCNC: 24.5 MG/DL — HIGH (ref 8–20)
CALCIUM SERPL-MCNC: 10.2 MG/DL — SIGNIFICANT CHANGE UP (ref 8.4–10.5)
CHLORIDE SERPL-SCNC: 101 MMOL/L — SIGNIFICANT CHANGE UP (ref 96–108)
CO2 SERPL-SCNC: 25 MMOL/L — SIGNIFICANT CHANGE UP (ref 22–29)
CREAT SERPL-MCNC: 1 MG/DL — SIGNIFICANT CHANGE UP (ref 0.5–1.3)
EGFR: 63 ML/MIN/1.73M2 — SIGNIFICANT CHANGE UP
EOSINOPHIL # BLD AUTO: 0.13 K/UL — SIGNIFICANT CHANGE UP (ref 0–0.5)
EOSINOPHIL NFR BLD AUTO: 3 % — SIGNIFICANT CHANGE UP (ref 0–6)
GLUCOSE SERPL-MCNC: 92 MG/DL — SIGNIFICANT CHANGE UP (ref 70–99)
HCT VFR BLD CALC: 35.4 % — SIGNIFICANT CHANGE UP (ref 34.5–45)
HGB BLD-MCNC: 11.3 G/DL — LOW (ref 11.5–15.5)
IMM GRANULOCYTES NFR BLD AUTO: 0.5 % — SIGNIFICANT CHANGE UP (ref 0–0.9)
LYMPHOCYTES # BLD AUTO: 0.8 K/UL — LOW (ref 1–3.3)
LYMPHOCYTES # BLD AUTO: 18.4 % — SIGNIFICANT CHANGE UP (ref 13–44)
MAGNESIUM SERPL-MCNC: 1.8 MG/DL — SIGNIFICANT CHANGE UP (ref 1.8–2.6)
MCHC RBC-ENTMCNC: 25.3 PG — LOW (ref 27–34)
MCHC RBC-ENTMCNC: 31.9 GM/DL — LOW (ref 32–36)
MCV RBC AUTO: 79.2 FL — LOW (ref 80–100)
MONOCYTES # BLD AUTO: 0.28 K/UL — SIGNIFICANT CHANGE UP (ref 0–0.9)
MONOCYTES NFR BLD AUTO: 6.4 % — SIGNIFICANT CHANGE UP (ref 2–14)
NEUTROPHILS # BLD AUTO: 3.1 K/UL — SIGNIFICANT CHANGE UP (ref 1.8–7.4)
NEUTROPHILS NFR BLD AUTO: 71.2 % — SIGNIFICANT CHANGE UP (ref 43–77)
NT-PROBNP SERPL-SCNC: 154 PG/ML — SIGNIFICANT CHANGE UP (ref 0–300)
PLATELET # BLD AUTO: 268 K/UL — SIGNIFICANT CHANGE UP (ref 150–400)
POTASSIUM SERPL-MCNC: 4 MMOL/L — SIGNIFICANT CHANGE UP (ref 3.5–5.3)
POTASSIUM SERPL-SCNC: 4 MMOL/L — SIGNIFICANT CHANGE UP (ref 3.5–5.3)
PROT SERPL-MCNC: 7.6 G/DL — SIGNIFICANT CHANGE UP (ref 6.6–8.7)
RBC # BLD: 4.47 M/UL — SIGNIFICANT CHANGE UP (ref 3.8–5.2)
RBC # FLD: 15.7 % — HIGH (ref 10.3–14.5)
SODIUM SERPL-SCNC: 139 MMOL/L — SIGNIFICANT CHANGE UP (ref 135–145)
TROPONIN T SERPL-MCNC: <0.01 NG/ML — SIGNIFICANT CHANGE UP (ref 0–0.06)
WBC # BLD: 4.35 K/UL — SIGNIFICANT CHANGE UP (ref 3.8–10.5)
WBC # FLD AUTO: 4.35 K/UL — SIGNIFICANT CHANGE UP (ref 3.8–10.5)

## 2023-07-01 PROCEDURE — 99285 EMERGENCY DEPT VISIT HI MDM: CPT

## 2023-07-01 PROCEDURE — 71045 X-RAY EXAM CHEST 1 VIEW: CPT | Mod: 26

## 2023-07-01 PROCEDURE — 83735 ASSAY OF MAGNESIUM: CPT

## 2023-07-01 PROCEDURE — 94640 AIRWAY INHALATION TREATMENT: CPT

## 2023-07-01 PROCEDURE — 83880 ASSAY OF NATRIURETIC PEPTIDE: CPT

## 2023-07-01 PROCEDURE — 71275 CT ANGIOGRAPHY CHEST: CPT | Mod: 26,MA

## 2023-07-01 PROCEDURE — 71275 CT ANGIOGRAPHY CHEST: CPT | Mod: MA

## 2023-07-01 PROCEDURE — 80053 COMPREHEN METABOLIC PANEL: CPT

## 2023-07-01 PROCEDURE — 36415 COLL VENOUS BLD VENIPUNCTURE: CPT

## 2023-07-01 PROCEDURE — 84484 ASSAY OF TROPONIN QUANT: CPT

## 2023-07-01 PROCEDURE — 85025 COMPLETE CBC W/AUTO DIFF WBC: CPT

## 2023-07-01 PROCEDURE — 93010 ELECTROCARDIOGRAM REPORT: CPT

## 2023-07-01 PROCEDURE — 93005 ELECTROCARDIOGRAM TRACING: CPT

## 2023-07-01 PROCEDURE — 71045 X-RAY EXAM CHEST 1 VIEW: CPT

## 2023-07-01 PROCEDURE — 99285 EMERGENCY DEPT VISIT HI MDM: CPT | Mod: 25

## 2023-07-01 RX ORDER — ACETAMINOPHEN 500 MG
650 TABLET ORAL ONCE
Refills: 0 | Status: COMPLETED | OUTPATIENT
Start: 2023-07-01 | End: 2023-07-01

## 2023-07-01 RX ORDER — LIDOCAINE 4 G/100G
1 CREAM TOPICAL ONCE
Refills: 0 | Status: DISCONTINUED | OUTPATIENT
Start: 2023-07-01 | End: 2023-07-09

## 2023-07-01 RX ORDER — FLUTICASONE PROPIONATE 50 MCG
1 SPRAY, SUSPENSION NASAL ONCE
Refills: 0 | Status: COMPLETED | OUTPATIENT
Start: 2023-07-01 | End: 2023-07-01

## 2023-07-01 RX ADMIN — Medication 650 MILLIGRAM(S): at 14:02

## 2023-07-01 RX ADMIN — Medication 1 SPRAY(S): at 18:24

## 2023-07-01 RX ADMIN — Medication 650 MILLIGRAM(S): at 15:00

## 2023-07-01 NOTE — ED PROVIDER NOTE - OBJECTIVE STATEMENT
63-year-old female with past medical history of sarcoidosis, HTN, DM, HLD, arthritis who presents to the ED complaining of pain in the right axilla/right arm, and shortness of breath starting yesterday.  Patient recently seen in the ED 6/24 for left leg and foot swelling and shortness of breath, found to have DVT in the left leg, started on Eliquis, no PE at that time.  Patient states she made a mistake taking her Eliquis, is supposed to be taking 2 pills twice a day, but was only taking 1 pill twice a day.  Denies cough, fever, chills, headache, lightheadedness, abdominal pain, nausea, vomiting.  Denies any fall or trauma to the area.

## 2023-07-01 NOTE — ED PROVIDER NOTE - ATTENDING APP SHARED VISIT CONTRIBUTION OF CARE
63F presenting for evaluation of shortness of breath, hx of DVT on eliquis, recently discharged though has been taking the incorrect dose, will r/o PE; follow up studies, reassess, dispo.

## 2023-07-01 NOTE — ED ADULT NURSE REASSESSMENT NOTE - NS ED NURSE REASSESS COMMENT FT1
Pt provided hospital non skid socks and ambulated independently to  with steady gait. Pt offers no medical complaints @ this time. Pt placed back onto CM with . VSS. NAD. Pending CTA @ this time. Pt made aware of plan of care and verbalized understanding.
Pt states her pain is less than when she came in. Pt does not show signs of distress at this moment. Pt was updated on plan of care and understands via verbal confirmation regarding waiting for CTA. M.D. currently at bedside.

## 2023-07-01 NOTE — ED PROVIDER NOTE - PROGRESS NOTE DETAILS
MD Eugenio: Patient with persistent pain, states it is worse when she moves her shoulder/tilts her head down. Suspect msk cause vs sarcoidosis. CTA negative for PE. Will dc home. Patient states she has follow up with Dr. Gandhi, her pulmonologist on July 10th.

## 2023-07-01 NOTE — ED PROVIDER NOTE - PHYSICAL EXAMINATION
Gen: AAOx3, NAD, well nourished  HEENT: Normocephalic atraumatic. EOMI. No scleral icterus. Moist mucus membranes.  CV: RRR. Audible S1 and S2. No murmurs. 2+ radial and PT pulses. Chest wall nontender to palpation.  Pulm: Clear to auscultation bilaterally. No wheezes, rales, or rhonchi. No accessory muscle use or respiratory distress.  Abdomen: soft, normoactive BS, non distended, nontender, no rebound, no guarding  Musculoskeletal:  Moving all extremities equally. No gross deformity. No tenderness to palpation.  Skin: No rashes or lesions. Warm.  Psych: Appropriate mood and affect. Cooperative.

## 2023-07-01 NOTE — ED PROVIDER NOTE - NS ED ROS FT
General: No fever, chills.  Respiratory: + SOB  Cardiac: + chest pain (right upper chest/axilla)  GI: No abdominal pain, nausea, vomiting  Neuro: No headache  MSK: No muscle pain, joint pain, back pain.  Psych: No known mental health issues.  Endocrine: No heat/cold intolerance, no polyuria/polydipsia.  Heme: No easy bruising or bleeding.  Allergic: No pruritis, dermatitis, or environmental allergies.

## 2023-07-01 NOTE — ED PROVIDER NOTE - NSICDXPASTMEDICALHX_GEN_ALL_CORE_FT
PAST MEDICAL HISTORY:  Arthritis     DM (diabetes mellitus)     DVT, lower extremity     HLD (hyperlipidemia)     HTN (hypertension)     Psoriasis     Sarcoidosis

## 2023-07-01 NOTE — ED PROVIDER NOTE - CLINICAL SUMMARY MEDICAL DECISION MAKING FREE TEXT BOX
63-year-old female with past medical history of sarcoidosis, HTN, DM, HLD, arthritis who presents to the ED complaining of pain in the right axilla/right arm, and shortness of breath starting yesterday.

## 2023-07-01 NOTE — ED ADULT NURSE NOTE - NSFALLHARMRISKINTERV_ED_ALL_ED

## 2023-07-01 NOTE — ED PROVIDER NOTE - PATIENT PORTAL LINK FT
You can access the FollowMyHealth Patient Portal offered by [facility] by registering at the following website: [email or web address]. By joining [employer]’s FollowMyHealth portal, you will also be able to view your health information using other applications (apps) compatible with our system.

## 2023-07-01 NOTE — ED ADULT TRIAGE NOTE - CHIEF COMPLAINT QUOTE
pain under right arm/chest area, hx of blood clot in left lower extremity on eliquis. reports "congestion" like "I can't breathe."

## 2023-07-01 NOTE — ED PROVIDER NOTE - NSFOLLOWUPINSTRUCTIONS_ED_ALL_ED_FT
- Take ibuprofen and/or tylenol as needed for pain.  - Follow up with your pulmonologist as scheduled.  - Follow up with your primary care doctor if pain continues.  - Return to the ED if pain worsens, you have worsening shortness of breath, or any other new or concerning symptoms.

## 2023-07-01 NOTE — ED ADULT NURSE NOTE - PRO INTERPRETER NEED 2
Postoperative day:    Subjective:  The patient feels well.  She is ambulating.  She is tolerating a regular diet.  She denies nausea and vomiting.  She is passing flatus.  She denies chest pain or shortness of breath.    Physical exam:    Vital Signs Last 24 Hrs  T(C): 37.1 (25 Oct 2019 04:15), Max: 37.2 (24 Oct 2019 17:56)  T(F): 98.7 (25 Oct 2019 04:15), Max: 98.9 (24 Oct 2019 17:56)  HR: 97 (25 Oct 2019 04:15) (71 - 97)  BP: 94/62 (25 Oct 2019 04:15) (94/62 - 127/78)  BP(mean): --  RR: 18 (25 Oct 2019 04:15) (10 - 18)  SpO2: 96% (25 Oct 2019 04:15) (95% - 100%)    Gen: NAD  Abdomen: Soft,  appropriately tender  Incision is clean dry and intact  Ext: No calf tenderness  SCD's in place and working    LABS:                        5.0    9.26  )-----------( 181      ( 25 Oct 2019 07:06 )             17.0         A/P POD #  s/p # 1  Doing well.  Patient looks pale H/H 5/17  Tolerating regular diet.  Encouraged ambulation.  Routine post op care.  Plan: Transfuse 2 units packed cells to start. English

## 2023-07-01 NOTE — ED ADULT NURSE NOTE - OBJECTIVE STATEMENT
Assumed care of pt at this time. Pt is A/Ox4. Pt c/o pain within axillary region of right armpit. Pt states the pain goes to her right shoulder and upper right back and down to her right thumb. Pt states the pain feels constant but is worse when she moves her arm, head, and neck. Pt states this started yesterday. Pt denies headache. Pt denies fever, chills, or N/V/D. Pt states she is not SOB but feels congestion. Pt states she has gout and arthritis. Pt endorses she was recently prescribed Eliquis for a recent DVT in her LLE but she mixed up the dosing and thinks she took less than she was prescribed. Pt denies dizziness.

## 2023-07-06 ENCOUNTER — APPOINTMENT (OUTPATIENT)
Dept: NEPHROLOGY | Facility: CLINIC | Age: 63
End: 2023-07-06

## 2023-07-07 ENCOUNTER — APPOINTMENT (OUTPATIENT)
Dept: NEPHROLOGY | Facility: CLINIC | Age: 63
End: 2023-07-07
Payer: MEDICAID

## 2023-07-07 VITALS
OXYGEN SATURATION: 97 % | DIASTOLIC BLOOD PRESSURE: 74 MMHG | HEIGHT: 61 IN | HEART RATE: 87 BPM | SYSTOLIC BLOOD PRESSURE: 126 MMHG | WEIGHT: 177 LBS | BODY MASS INDEX: 33.42 KG/M2

## 2023-07-07 DIAGNOSIS — E83.52 HYPERCALCEMIA: ICD-10-CM

## 2023-07-07 DIAGNOSIS — I10 ESSENTIAL (PRIMARY) HYPERTENSION: ICD-10-CM

## 2023-07-07 DIAGNOSIS — N18.2 CHRONIC KIDNEY DISEASE, STAGE 2 (MILD): ICD-10-CM

## 2023-07-07 PROBLEM — M19.90 UNSPECIFIED OSTEOARTHRITIS, UNSPECIFIED SITE: Chronic | Status: ACTIVE | Noted: 2023-07-01

## 2023-07-07 PROBLEM — E78.5 HYPERLIPIDEMIA, UNSPECIFIED: Chronic | Status: ACTIVE | Noted: 2023-07-01

## 2023-07-07 PROBLEM — I82.409 ACUTE EMBOLISM AND THROMBOSIS OF UNSPECIFIED DEEP VEINS OF UNSPECIFIED LOWER EXTREMITY: Chronic | Status: ACTIVE | Noted: 2023-07-01

## 2023-07-07 PROCEDURE — 99213 OFFICE O/P EST LOW 20 MIN: CPT

## 2023-07-07 RX ORDER — POLYETHYLENE GLYCOL-3350 AND ELECTROLYTES WITH FLAVOR PACK 240; 5.84; 2.98; 6.72; 22.72 G/278.26G; G/278.26G; G/278.26G; G/278.26G; G/278.26G
240 POWDER, FOR SOLUTION ORAL
Qty: 1 | Refills: 0 | Status: DISCONTINUED | COMMUNITY
Start: 2023-05-11 | End: 2023-07-07

## 2023-07-07 RX ORDER — TRIAMCINOLONE ACETONIDE 1 MG/G
0.1 CREAM TOPICAL TWICE DAILY
Qty: 1 | Refills: 3 | Status: DISCONTINUED | COMMUNITY
Start: 2023-06-28 | End: 2023-07-07

## 2023-07-07 RX ORDER — PREDNISONE 2.5 MG/1
2.5 TABLET ORAL DAILY
Qty: 90 | Refills: 0 | Status: ACTIVE | COMMUNITY
Start: 2023-07-07 | End: 1900-01-01

## 2023-07-07 RX ORDER — BLOOD-GLUCOSE METER
W/DEVICE KIT MISCELLANEOUS
Qty: 1 | Refills: 0 | Status: DISCONTINUED | COMMUNITY
Start: 2022-02-03 | End: 2023-07-07

## 2023-07-07 RX ORDER — PROBENECID 500 MG/1
500 TABLET ORAL
Refills: 0 | Status: DISCONTINUED | COMMUNITY
End: 2023-07-07

## 2023-07-07 RX ORDER — BLOOD SUGAR DIAGNOSTIC
STRIP MISCELLANEOUS
Qty: 50 | Refills: 0 | Status: DISCONTINUED | COMMUNITY
Start: 2020-08-18 | End: 2023-07-07

## 2023-07-07 RX ORDER — ASPIRIN 81 MG
81 TABLET, DELAYED RELEASE (ENTERIC COATED) ORAL
Refills: 0 | Status: DISCONTINUED | COMMUNITY
End: 2023-07-07

## 2023-07-07 NOTE — ASSESSMENT
[FreeTextEntry1] : 63 year old female with CKD 2/3, sarcoidosis, hypercalcemia secondary to sarcoidosis, and gout presents for follow up of hypercalcemia secondary to sarcoidosis. Since last office visit she was diagnosed with DVT, started on Eliquis. Family hx of DVT/PE in mother and sister. Has upcoming appointment with Heme/Onc. Remains on prednisone 2.5mg daily for hypercalcemia in setting of sarcoidosis. \par \par Hypercalcemia \par Sarcoidosis\par -Labs on July 1, 2023 at [facility] showed - Calcium 10.2, albumin 4.7\par -Continue prednisone 2.5mg daily \par \par CKD 2/3\par -Prescribed 14 day course of clindamycin by podiatry for erythema to L toe - ? cellulitis\par -Encouraged patient to eat yogurt or take a probiotic while on antibiotics \par \par Hypertension\par -BP acceptable \par \par Return to clinic in 3 months; sooner if needed

## 2023-07-07 NOTE — HISTORY OF PRESENT ILLNESS
[FreeTextEntry1] : 63 year old female with CKD 2, sarcoidosis, hypercalcemia secondary to sarcoidosis, and gout presents for follow up of hypercalcemia secondary to sarcoidosis. Since last office visit she was diagnosed with DVT, started on Eliquis. Family hx of DVT/PE in mother and sister. Has upcoming appointment with Heme/Onc. Remains on prednisone 2.5mg daily for hypercalcemia in setting of sarcoidosis.

## 2023-07-07 NOTE — REVIEW OF SYSTEMS
[Feeling Poorly] : not feeling poorly [Feeling Tired] : not feeling tired [Chest Pain] : no chest pain [Palpitations] : no palpitations [Lower Ext Edema] : no lower extremity edema [Shortness Of Breath] : no shortness of breath [SOB on Exertion] : no shortness of breath during exertion [Orthopnea] : no orthopnea [Dizziness] : no dizziness

## 2023-07-07 NOTE — PHYSICAL EXAM
[General Appearance - Alert] : alert [General Appearance - In No Acute Distress] : in no acute distress [General Appearance - Well Nourished] : well nourished [General Appearance - Well Developed] : well developed [] : no respiratory distress [Respiration, Rhythm And Depth] : normal respiratory rhythm and effort [Exaggerated Use Of Accessory Muscles For Inspiration] : no accessory muscle use [Auscultation Breath Sounds / Voice Sounds] : lungs were clear to auscultation bilaterally [Heart Rate And Rhythm] : heart rate was normal and rhythm regular [Heart Sounds] : normal S1 and S2 [Edema] : there was no peripheral edema [Bowel Sounds] : normal bowel sounds [Abdomen Soft] : soft [Abdomen Tenderness] : non-tender [Abnormal Walk] : normal gait [Skin Color & Pigmentation] : normal skin color and pigmentation [Oriented To Time, Place, And Person] : oriented to person, place, and time

## 2023-07-10 ENCOUNTER — APPOINTMENT (OUTPATIENT)
Dept: PULMONOLOGY | Facility: CLINIC | Age: 63
End: 2023-07-10
Payer: MEDICAID

## 2023-07-10 VITALS — BODY MASS INDEX: 32.85 KG/M2 | WEIGHT: 174 LBS | HEIGHT: 61 IN

## 2023-07-10 VITALS
SYSTOLIC BLOOD PRESSURE: 124 MMHG | RESPIRATION RATE: 16 BRPM | HEART RATE: 82 BPM | OXYGEN SATURATION: 98 % | DIASTOLIC BLOOD PRESSURE: 64 MMHG

## 2023-07-10 PROCEDURE — 94010 BREATHING CAPACITY TEST: CPT

## 2023-07-10 PROCEDURE — 94729 DIFFUSING CAPACITY: CPT

## 2023-07-10 PROCEDURE — 99214 OFFICE O/P EST MOD 30 MIN: CPT | Mod: 25

## 2023-07-10 PROCEDURE — 94727 GAS DIL/WSHOT DETER LNG VOL: CPT

## 2023-07-10 PROCEDURE — 85018 HEMOGLOBIN: CPT | Mod: QW

## 2023-07-10 NOTE — CONSULT LETTER
[Dear  ___] : Dear  [unfilled], [Consult Letter:] : I had the pleasure of evaluating your patient, [unfilled]. [Please see my note below.] : Please see my note below. [Consult Closing:] : Thank you very much for allowing me to participate in the care of this patient.  If you have any questions, please do not hesitate to contact me. [Sincerely,] : Sincerely, [DrYasmani  ___] : Dr. GASPAR [DrYasmani ___] : Dr. GASPAR [___] : [unfilled] [FreeTextEntry3] : Leland Sims MD, FCCP, D. ABSM\par Pulmonary and Sleep Medicine\par [facility] [place]\par \par

## 2023-07-10 NOTE — HISTORY OF PRESENT ILLNESS
[Excess Weight] : excess weight [Dyspnea] : dyspnea [Joint Pain] : joint pain [Low Calorie Diet] : low calorie diet [Fair Compliance] : fair compliance with treatment [Fair Tolerance] : fair tolerance of treatment [Diabetes] : diabetes [Hypertension] : hypertension [Low Calorie] : low calorie [High] : high [Well Balanced Diet] : well balanced meals [Infrequently] : exercises infrequently [Walking] : walking [Follow-Up - Routine Clinic] : a routine clinic follow-up of [Excessive Daytime Sleepiness] : excessive daytime sleepiness [Snoring] : snoring [Unrefreshing Sleep] : unrefreshing sleep [Sleepy When Sedentary] : sleepy when sedentary [Currently Experiencing] : The patient is currently experiencing symptoms. [None] : No associated symptoms are reported [CPAP] : CPAP [Poor Compliance] : poor compliance with treatment [Poor Tolerance] : poor tolerance of treatment [Poor Symptom Control] : poor symptom control [On ___] : performed on [unfilled] [Patient] : the patient [To Assess ___] : to assess [unfilled] [TextBox_4] : The patient recently suffered an ankle injury and was on crutches and a wheelchair but now is walking without support.\par She is followed by hematology for anemia.\par She has been on prednisone per nephrology for hypercalcemia.\par Pt dx'd with Covid 19 on 1/8/22 when she developed h/a, congested cough, nasal congestion. She did not require therapy and improved. \par Pt s/p Covid infection again on 12/15/22 with mild throat soreness but without other complaints. Pt did not require therapy.\par Dx'd with DVT 6/24/23 on Eliquis but CTA x 2 was neg for PE; to see heme\par  [Witnessed Apnea During Sleep] : no witnessed apnea during sleep [Witnessed Gasping During Sleep] : no witnessed gasping during sleep [de-identified] : at 6 cm of water [FreeTextEntry9] : Chest CT [FreeTextEntry8] : increasing mediastinal and hilar HI

## 2023-07-10 NOTE — DISCUSSION/SUMMARY
[FreeTextEntry1] : \par #1. CPAP at 6 cm of water to treat moderate to severe WALTER; encouraged compliance in the past though pt is not compliant; Consider dental evaluation for an oral appliance as alternative therapy for treatment of WALTER though no one takes her insurance so have referred pt to the Dr. Access line in the past; The patient understands the risks of untreated WALTER including heart disease, strokes, hypertension, pulmonary hypertension, and motor vehicle accidents as well as the need for treatment and weight loss; will order Dreamwear nasal mask to improve compliance\par #2. Lung function testing have been normal; will repeat annually\par #3. Diet and exercise for weight loss\par #4. Repeat chest CT to f/u sarcoidosis as above; prednisone for hypercalcemia likely from sarcoidosis as needed per renal; repeat CT revealed stable changes; consider repeat CT in 1-2 years given continued stability\par #5. F/u in 3 months with compliance \par #6. Gyn f/u for pelvic HI\par #7. SOBOE is likely related to weight or deconditioning given normal PFTs\par #8. The patient does not appear to require BD therapy at this time\par #9. Follow ACE and Ca levels per rheumatology; prednisone as needed for hypercalcemia thought to be due to her sarcoidosis per nephrology\par #10. S/p obesity medicine eval\par #11. Rheumatology and renal f/u\par #12. Consider repeat PSG/HST with weight loss given non-compliance with therapy for mod-sev WALTER but pt unwilling for now\par #13. Heme evaluation for DVT; on Eliquis\par \par The patient expressed understanding and agreement with the above recommendations/plan and accepts responsibility to be compliant with recommended testing, therapies, and f/u visits.\par All relevant questions and concerns were addressed.

## 2023-07-10 NOTE — PROCEDURE
[FreeTextEntry1] : PFTs performed previously were normal.\par Spirometry subsequently was normal.\par PFTs 9/18/18 - normal\par PFTs 9/17/19 - normal and without significant change\par PFTs 8/20/20 - normal and without significant change\par PFTs 3/23/22 - normal\par PFTs 10/7/22 - normal\par PFTs 7/10/23 - normal

## 2023-07-18 ENCOUNTER — OUTPATIENT (OUTPATIENT)
Dept: OUTPATIENT SERVICES | Facility: HOSPITAL | Age: 63
LOS: 1 days | End: 2023-07-18
Payer: MEDICAID

## 2023-07-18 ENCOUNTER — APPOINTMENT (OUTPATIENT)
Dept: MAMMOGRAPHY | Facility: CLINIC | Age: 63
End: 2023-07-18
Payer: MEDICAID

## 2023-07-18 DIAGNOSIS — Z12.39 ENCOUNTER FOR OTHER SCREENING FOR MALIGNANT NEOPLASM OF BREAST: ICD-10-CM

## 2023-07-18 PROCEDURE — 77067 SCR MAMMO BI INCL CAD: CPT

## 2023-07-18 PROCEDURE — 76641 ULTRASOUND BREAST COMPLETE: CPT

## 2023-07-18 PROCEDURE — 77063 BREAST TOMOSYNTHESIS BI: CPT

## 2023-07-18 PROCEDURE — 76641 ULTRASOUND BREAST COMPLETE: CPT | Mod: 26,50

## 2023-07-18 PROCEDURE — 77067 SCR MAMMO BI INCL CAD: CPT | Mod: 26

## 2023-07-18 PROCEDURE — 77063 BREAST TOMOSYNTHESIS BI: CPT | Mod: 26

## 2023-08-18 NOTE — ED ADULT NURSE NOTE - PAIN RATING/NUMBER SCALE (0-10): ACTIVITY
Additional Notes: Discussed sending in clobetasol if eczema flares again. Render Risk Assessment In Note?: no Detail Level: Simple 10

## 2023-08-23 ENCOUNTER — APPOINTMENT (OUTPATIENT)
Dept: RHEUMATOLOGY | Facility: CLINIC | Age: 63
End: 2023-08-23
Payer: MEDICAID

## 2023-08-23 VITALS
SYSTOLIC BLOOD PRESSURE: 120 MMHG | OXYGEN SATURATION: 98 % | DIASTOLIC BLOOD PRESSURE: 70 MMHG | TEMPERATURE: 98.1 F | HEART RATE: 87 BPM

## 2023-08-23 PROCEDURE — 99214 OFFICE O/P EST MOD 30 MIN: CPT

## 2023-09-21 ENCOUNTER — APPOINTMENT (OUTPATIENT)
Dept: RHEUMATOLOGY | Facility: CLINIC | Age: 63
End: 2023-09-21
Payer: MEDICAID

## 2023-09-21 VITALS
BODY MASS INDEX: 33.61 KG/M2 | SYSTOLIC BLOOD PRESSURE: 132 MMHG | HEART RATE: 94 BPM | TEMPERATURE: 97.1 F | HEIGHT: 61 IN | DIASTOLIC BLOOD PRESSURE: 80 MMHG | WEIGHT: 178 LBS | OXYGEN SATURATION: 98 %

## 2023-09-21 PROCEDURE — 99214 OFFICE O/P EST MOD 30 MIN: CPT | Mod: 25

## 2023-09-22 LAB
ALBUMIN SERPL ELPH-MCNC: 4.1 G/DL
ALP BLD-CCNC: 50 U/L
ALT SERPL-CCNC: 11 U/L
ANION GAP SERPL CALC-SCNC: 12 MMOL/L
AST SERPL-CCNC: 15 U/L
BILIRUB SERPL-MCNC: 0.2 MG/DL
BUN SERPL-MCNC: 38 MG/DL
CALCIUM SERPL-MCNC: 10.1 MG/DL
CHLORIDE SERPL-SCNC: 102 MMOL/L
CO2 SERPL-SCNC: 25 MMOL/L
CREAT SERPL-MCNC: 1.44 MG/DL
CRP SERPL-MCNC: 28 MG/L
EGFR: 41 ML/MIN/1.73M2
ERYTHROCYTE [SEDIMENTATION RATE] IN BLOOD BY WESTERGREN METHOD: 71 MM/HR
GLUCOSE SERPL-MCNC: 83 MG/DL
POTASSIUM SERPL-SCNC: 4.5 MMOL/L
PROT SERPL-MCNC: 7 G/DL
SODIUM SERPL-SCNC: 139 MMOL/L
URATE SERPL-MCNC: 7.4 MG/DL

## 2023-10-03 ENCOUNTER — APPOINTMENT (OUTPATIENT)
Dept: NEPHROLOGY | Facility: CLINIC | Age: 63
End: 2023-10-03
Payer: MEDICAID

## 2023-10-03 VITALS
OXYGEN SATURATION: 97 % | SYSTOLIC BLOOD PRESSURE: 124 MMHG | HEIGHT: 61 IN | BODY MASS INDEX: 33.04 KG/M2 | HEART RATE: 77 BPM | DIASTOLIC BLOOD PRESSURE: 74 MMHG | WEIGHT: 175 LBS

## 2023-10-03 PROCEDURE — 99214 OFFICE O/P EST MOD 30 MIN: CPT

## 2023-10-09 ENCOUNTER — APPOINTMENT (OUTPATIENT)
Dept: PULMONOLOGY | Facility: CLINIC | Age: 63
End: 2023-10-09
Payer: MEDICAID

## 2023-10-09 VITALS — HEIGHT: 61 IN | BODY MASS INDEX: 31.91 KG/M2 | WEIGHT: 169 LBS

## 2023-10-09 VITALS
SYSTOLIC BLOOD PRESSURE: 124 MMHG | HEART RATE: 75 BPM | OXYGEN SATURATION: 99 % | RESPIRATION RATE: 16 BRPM | DIASTOLIC BLOOD PRESSURE: 76 MMHG

## 2023-10-09 PROCEDURE — 99214 OFFICE O/P EST MOD 30 MIN: CPT

## 2023-10-09 RX ORDER — PREDNISONE 2.5 MG/1
2.5 TABLET ORAL DAILY
Refills: 0 | Status: DISCONTINUED | COMMUNITY
End: 2023-10-09

## 2023-10-09 RX ORDER — PREDNISONE 5 MG/1
5 TABLET ORAL
Qty: 30 | Refills: 1 | Status: DISCONTINUED | COMMUNITY
Start: 2023-08-23 | End: 2023-10-09

## 2023-11-08 ENCOUNTER — APPOINTMENT (OUTPATIENT)
Dept: ENDOCRINOLOGY | Facility: CLINIC | Age: 63
End: 2023-11-08
Payer: MEDICAID

## 2023-11-08 VITALS — DIASTOLIC BLOOD PRESSURE: 82 MMHG | SYSTOLIC BLOOD PRESSURE: 120 MMHG

## 2023-11-08 VITALS — HEIGHT: 61 IN | BODY MASS INDEX: 33.04 KG/M2 | WEIGHT: 175 LBS

## 2023-11-08 VITALS — HEART RATE: 88 BPM | OXYGEN SATURATION: 99 %

## 2023-11-08 DIAGNOSIS — E55.9 VITAMIN D DEFICIENCY, UNSPECIFIED: ICD-10-CM

## 2023-11-08 LAB — GLUCOSE BLDC GLUCOMTR-MCNC: 97

## 2023-11-08 PROCEDURE — 99214 OFFICE O/P EST MOD 30 MIN: CPT | Mod: 25

## 2023-11-08 PROCEDURE — 82962 GLUCOSE BLOOD TEST: CPT

## 2023-11-08 RX ORDER — PREDNISONE 10 MG/1
10 TABLET ORAL
Qty: 30 | Refills: 0 | Status: DISCONTINUED | COMMUNITY
Start: 2023-10-03 | End: 2023-11-08

## 2023-11-17 LAB
24R-OH-CALCIDIOL SERPL-MCNC: 24 PG/ML
25(OH)D3 SERPL-MCNC: 24.9 NG/ML
ALBUMIN SERPL ELPH-MCNC: 4.1 G/DL
ALP BLD-CCNC: 58 U/L
ALT SERPL-CCNC: 11 U/L
ANION GAP SERPL CALC-SCNC: 13 MMOL/L
AST SERPL-CCNC: 16 U/L
BILIRUB SERPL-MCNC: 0.4 MG/DL
BUN SERPL-MCNC: 30 MG/DL
CALCIUM SERPL-MCNC: 10.2 MG/DL
CALCIUM SERPL-MCNC: 10.2 MG/DL
CHLORIDE SERPL-SCNC: 100 MMOL/L
CHOLEST SERPL-MCNC: 187 MG/DL
CO2 SERPL-SCNC: 27 MMOL/L
CREAT SERPL-MCNC: 1.16 MG/DL
CREAT SPEC-SCNC: 90 MG/DL
EGFR: 53 ML/MIN/1.73M2
ESTIMATED AVERAGE GLUCOSE: 128 MG/DL
GLUCOSE SERPL-MCNC: 87 MG/DL
HBA1C MFR BLD HPLC: 6.1 %
HCT VFR BLD CALC: 33.7 %
HDLC SERPL-MCNC: 43 MG/DL
HGB BLD-MCNC: 10.1 G/DL
LDLC SERPL CALC-MCNC: 123 MG/DL
MCHC RBC-ENTMCNC: 23.9 PG
MCHC RBC-ENTMCNC: 30 GM/DL
MCV RBC AUTO: 79.9 FL
MICROALBUMIN 24H UR DL<=1MG/L-MCNC: <1.2 MG/DL
MICROALBUMIN/CREAT 24H UR-RTO: NORMAL MG/G
NONHDLC SERPL-MCNC: 144 MG/DL
PARATHYROID HORMONE INTACT: 25 PG/ML
PLATELET # BLD AUTO: 374 K/UL
POTASSIUM SERPL-SCNC: 4.1 MMOL/L
PROT SERPL-MCNC: 7.3 G/DL
RBC # BLD: 4.22 M/UL
RBC # FLD: 16.7 %
SODIUM SERPL-SCNC: 140 MMOL/L
T4 FREE SERPL-MCNC: 1.1 NG/DL
TRIGL SERPL-MCNC: 115 MG/DL
TSH SERPL-ACNC: 0.91 UIU/ML
VIT B12 SERPL-MCNC: 913 PG/ML
WBC # FLD AUTO: 6.62 K/UL

## 2023-11-22 ENCOUNTER — APPOINTMENT (OUTPATIENT)
Dept: RHEUMATOLOGY | Facility: CLINIC | Age: 63
End: 2023-11-22
Payer: MEDICAID

## 2023-11-22 VITALS
SYSTOLIC BLOOD PRESSURE: 122 MMHG | DIASTOLIC BLOOD PRESSURE: 76 MMHG | HEIGHT: 61 IN | HEART RATE: 110 BPM | OXYGEN SATURATION: 98 % | TEMPERATURE: 98.5 F

## 2023-11-22 PROCEDURE — 99214 OFFICE O/P EST MOD 30 MIN: CPT

## 2023-11-22 RX ORDER — DULOXETINE HYDROCHLORIDE 20 MG/1
20 CAPSULE, DELAYED RELEASE PELLETS ORAL
Qty: 30 | Refills: 1 | Status: DISCONTINUED | COMMUNITY
Start: 2023-06-28 | End: 2023-11-22

## 2023-11-25 ENCOUNTER — APPOINTMENT (OUTPATIENT)
Dept: CT IMAGING | Facility: CLINIC | Age: 63
End: 2023-11-25

## 2023-12-22 NOTE — ED PROVIDER NOTE - PRINCIPAL DIAGNOSIS
Your sugar is a little high.  In the prediabetic range.  Work on a low carb diet.  More chicken, fish, and pork and less carbs such as white rice, white bread and noodles.  Better off with whole wheat.  Avoid sodas, fruit drinks and alcohol.  Exercise and building muscle can help     Dyspnea

## 2024-01-10 ENCOUNTER — APPOINTMENT (OUTPATIENT)
Dept: ORTHOPEDIC SURGERY | Facility: CLINIC | Age: 64
End: 2024-01-10
Payer: MEDICAID

## 2024-01-10 VITALS
HEIGHT: 61 IN | DIASTOLIC BLOOD PRESSURE: 82 MMHG | BODY MASS INDEX: 32.1 KG/M2 | SYSTOLIC BLOOD PRESSURE: 126 MMHG | WEIGHT: 170 LBS | HEART RATE: 111 BPM

## 2024-01-10 DIAGNOSIS — M10.9 GOUT, UNSPECIFIED: ICD-10-CM

## 2024-01-10 PROCEDURE — 73564 X-RAY EXAM KNEE 4 OR MORE: CPT | Mod: RT

## 2024-01-10 PROCEDURE — 20610 DRAIN/INJ JOINT/BURSA W/O US: CPT | Mod: RT

## 2024-01-10 PROCEDURE — 99214 OFFICE O/P EST MOD 30 MIN: CPT | Mod: 25

## 2024-01-10 NOTE — DISCUSSION/SUMMARY
[Medication Risks Reviewed] : Medication risks reviewed [Other: ____] : in [unfilled] [Surgical risks reviewed] : Surgical risks reviewed [de-identified] : Patient is a 63-year-old female with right knee arthritis secondary to gouty arthritis versus psoriatic arthritis presenting today for initial evaluation.  She is not yet tried conservative treatment I think that is warranted at this time.  I gave her injection of cortisone in the right knee which she tolerated well.  We discussed low impact activity and exercise.  I recommended physical therapy.  I recommended Tylenol as needed for the pain as she is unable take NSAIDs due to she is on Eliquis.  I will see her back in 2 months for repeat evaluation.  All questions were asked and answered

## 2024-01-10 NOTE — PROCEDURE
[Injection] : Injection [Right] : of the right [Knee Joint] : knee joint [Osteoarthritis] : Osteoarthritis [Joint Pain] : Joint pain [Patient] : patient [Verbal Consent Obtained] : verbal consent was obtained prior to the procedure [Alcohol] : Alcohol [Ethyl Chloride Spray] : ethyl chloride spray was used as a topical anesthetic [Lateral] : lateral [22] : a 22-gauge [1% Lidocaine___(mL)] : [unfilled] mL of 1% Lidocaine [Methylpred. 40mg/mL___(mL)] : [unfilled] mL of 40mg/mL methylprednisolone [Bandage Applied] : a bandage [Tolerated Well] : The patient tolerated the procedure well [None] : none [No Strenuous Activity___day(s)] : avoid strenuous activity for [unfilled] day(s) [___ Month(s)] : in [unfilled] month(s) [de-identified] : I injected the right knee.   I discussed at length with the patient the planned steroid and lidocaine injection. The risks, benefits, convalescence and alternatives were reviewed. The possible side effects discussed included but were not limited to: pain, swelling, heat, bleeding, and redness. Symptoms are generally mild but if they are extensive then contact the office. Giving pain relievers by mouth such as NSAIDs or Tylenol can generally treat the reactions to steroid and lidocaine. Rare cases of infection have been noted. Rash, hives and itching may occur post injection. If you have muscle pain or cramps, flushing and or swelling of the face, rapid heart beat, nausea, dizziness, fever, chills, headache, difficulty breathing, swelling in the arms or legs, or have a prickly feeling of your skin, contact a health care provider immediately. Following this discussion, the knee was prepped with Alcohol and under sterile condition the 80 mg Depo-Medrol and 6 cc Lidocaine injection was performed with a 22 gauge needle through a superolateral injection site. The needle was introduced into the joint, aspiration was performed to ensure intra-articular placement and the medication was injected. Upon withdrawal of the needle the site was cleaned with alcohol and a band aid applied. The patient tolerated the injection well and there were no adverse effects. Post injection instructions included no strenuous activity for 24 hours, cryotherapy and if there are any adverse effects to contact the office.

## 2024-01-10 NOTE — ADDENDUM
[FreeTextEntry1] : This note was written by Julieth Dexter, acting as the  for Dr. Champion. This note accurately reflects the work and decisions made by Dr. Champion.

## 2024-01-10 NOTE — PHYSICAL EXAM
[de-identified] : GENERAL APPEARANCE: Well nourished and hydrated, pleasant, alert, and oriented x 3. Appears their stated age.  HEENT: Normocephalic, extraocular eye motion intact. Nasal septum midline. Oral cavity clear. External auditory canal clear.  RESPIRATORY: Breath sounds clear and audible in all lobes. No wheezing, No accessory muscle use. CARDIOVASCULAR: No apparent abnormalities. No lower leg edema. No varicosities. Pedal pulses are palpable. NEUROLOGIC: Sensation is normal, no muscle weakness in the upper or lower extremities. DERMATOLOGIC: No apparent skin lesions, moist, warm, no rash. SPINE: Cervical spine appears normal and moves freely; thoracic spine appears normal and moves freely; lumbosacral spine appears normal and moves freely, normal, nontender. MUSCULOSKELETAL: Hands, wrists, and elbows are normal and move freely, shoulders are normal and move freely.  PSYCHIATRIC: Oriented to person, place, and time, insight and judgement were intact and the affect was normal. [de-identified] : Right knee exam shows moderate effusion, ROM is 0-100 degrees, no instability, pain with [place], medial and lateral joint line tenderness.  5/5 motor strength in bilateral lower extremities. Sensory: Intact in bilateral lower extremities. DTRs: Biceps, brachioradialis, triceps, patellar, ankle and plantar 2+ and symmetric bilaterally. Pulses: dorsalis pedis, posterior tibial, femoral, popliteal, and radial 2+ and symmetric bilaterally. [de-identified] : 4 views of the right knee obtained the office today show no acute fracture or dislocation.  There is medial joint space narrowing tricompartmental degenerative changes osteopenia consistent with gouty versus psoriatic arthritis

## 2024-01-10 NOTE — HISTORY OF PRESENT ILLNESS
[Pain Location] : pain [] : right knee [Worsening] : worsening [Standing] : standing [Constant] : ~He/She~ states the symptoms seem to be constant [Sitting] : worsened by sitting [Running] : worsened by running [Walking] : worsened by walking [Knee Flexion] : worsened with knee flexion [Knee Extension] : worsened with knee extension [de-identified] : 63 year old female presents today for an initial consultation for right knee pain.  Patient states that she been having pain in the right knee for many months.  She has a history of psoriatic arthritis as well as gouty arthritis and states that she gets intermittent flareups in the knee.  States that 4 weeks ago she had a flareup.  Causes pain with bending the knee.  Has difficulty going down stairs and rising reseated position.  Denies any fevers chills or constitutional symptoms.  Denies any falls or trauma to the knee [de-identified] : going up and down the stairs, rising from a seated position

## 2024-01-16 ENCOUNTER — APPOINTMENT (OUTPATIENT)
Dept: NEPHROLOGY | Facility: CLINIC | Age: 64
End: 2024-01-16
Payer: MEDICARE

## 2024-01-16 VITALS
WEIGHT: 170 LBS | HEIGHT: 61 IN | DIASTOLIC BLOOD PRESSURE: 82 MMHG | SYSTOLIC BLOOD PRESSURE: 136 MMHG | HEART RATE: 86 BPM | OXYGEN SATURATION: 99 % | BODY MASS INDEX: 32.1 KG/M2

## 2024-01-16 PROCEDURE — 99214 OFFICE O/P EST MOD 30 MIN: CPT

## 2024-01-16 NOTE — PHYSICAL EXAM
[TextEntry] : Gen: NAD, well-appearing; obese HEENT: Supple neck, MMM Pulm: CTA CV: RRR, no rub Back: No spinal or CVA tenderness Abd: +BS, soft, nontender/nondistended LE: Warm, no edema Neuro: No focal deficits Psych: Normal affect Skin: Warm, without rashes MSK: Chronic OA changes

## 2024-01-16 NOTE — REVIEW OF SYSTEMS
[TextEntry] : Review of systems: All systems were reviewed in detail, pertinent positive and negative have been mentioned above, otherwise negative.

## 2024-01-16 NOTE — ASSESSMENT
[FreeTextEntry1] : 63-year-old female with hypertension, diabetes mellitus, hypercalcemia related to sarcoidosis on prednisone, gout seen today as a follow-up in outpatient nephrology clinic.  1. Chronic Kidney Disease Stage II/IIIa -ETIO: likely multifactorial 2/2 diabetic nephropathy, hypertensive nephrosclerosis and chronic NSAID use. -SCr: Has ranged between 1.1-1.4 in the past most recent labs with GFR at 59, will repeat labs today -Proteinuria: no significant proteinuria -Renal US: Not available, CT in the past significant for small bilateral nonobstructive calculi -Discussed goal HTN < 140/90, LDL <100, A1c <7.  COMPLICATIONS OF CKD: -BMD w/ CKD: We will check labs today -Anemia w/ CKD: None -Edema: Stable on Lasix 20 mg daily -Hyperuricemia: We will check uric acid level  2. HTN: controlled on current medications. 3. Hypercalcemia related to sarcoidosis: Controlled, last labs with a serum calcium of 10.3 corrected for albumin. 4. DM: controlled on current medications.  Last A1c 6.1 per patient 5.  Sarcoidosis: Management pulmonology and rheumatology, on prednisone, continue follow-up.

## 2024-01-16 NOTE — HISTORY OF PRESENT ILLNESS
[FreeTextEntry1] : 63-year-old female with hypertension, diabetes mellitus, hypercalcemia related to sarcoidosis on prednisone, gout seen today as a follow-up in outpatient nephrology clinic. Patient reports no health-related adverse event since last clinic visit.  NO ER/Hospitalization/urgent care visit. Denies any cardiac or urinary complaints. No dysuria, gross hematuria, SOB, LUTS. Reports blood sugars and BP has been well controlled.  States last A1c was 6.1. There is no recent change in medications. Denies cramps, tetany, numbness, tingling, focal weakness, recent acute gout flare.

## 2024-01-22 ENCOUNTER — APPOINTMENT (OUTPATIENT)
Dept: PULMONOLOGY | Facility: CLINIC | Age: 64
End: 2024-01-22

## 2024-02-28 ENCOUNTER — APPOINTMENT (OUTPATIENT)
Dept: RHEUMATOLOGY | Facility: CLINIC | Age: 64
End: 2024-02-28
Payer: MEDICAID

## 2024-02-28 VITALS — DIASTOLIC BLOOD PRESSURE: 65 MMHG | OXYGEN SATURATION: 99 % | HEART RATE: 125 BPM | SYSTOLIC BLOOD PRESSURE: 120 MMHG

## 2024-02-28 PROCEDURE — 99214 OFFICE O/P EST MOD 30 MIN: CPT

## 2024-02-28 RX ORDER — PROBENECID 500 MG/1
500 TABLET ORAL
Qty: 280 | Refills: 1 | Status: DISCONTINUED | COMMUNITY
Start: 2023-06-28 | End: 2024-02-28

## 2024-02-29 ENCOUNTER — APPOINTMENT (OUTPATIENT)
Dept: NEPHROLOGY | Facility: CLINIC | Age: 64
End: 2024-02-29

## 2024-03-06 ENCOUNTER — APPOINTMENT (OUTPATIENT)
Dept: ENDOCRINOLOGY | Facility: CLINIC | Age: 64
End: 2024-03-06

## 2024-03-09 ENCOUNTER — EMERGENCY (EMERGENCY)
Facility: HOSPITAL | Age: 64
LOS: 1 days | Discharge: HOME | End: 2024-03-09
Attending: EMERGENCY MEDICINE
Payer: MEDICARE

## 2024-03-09 VITALS
TEMPERATURE: 98 F | RESPIRATION RATE: 20 BRPM | HEART RATE: 124 BPM | OXYGEN SATURATION: 99 % | DIASTOLIC BLOOD PRESSURE: 73 MMHG | SYSTOLIC BLOOD PRESSURE: 144 MMHG | HEIGHT: 62 IN | WEIGHT: 174.61 LBS

## 2024-03-09 VITALS
TEMPERATURE: 98 F | DIASTOLIC BLOOD PRESSURE: 71 MMHG | HEART RATE: 109 BPM | SYSTOLIC BLOOD PRESSURE: 134 MMHG | RESPIRATION RATE: 16 BRPM | OXYGEN SATURATION: 99 %

## 2024-03-09 LAB
ALBUMIN SERPL ELPH-MCNC: 3.7 G/DL — SIGNIFICANT CHANGE UP (ref 3.3–5.2)
ALP SERPL-CCNC: 66 U/L — SIGNIFICANT CHANGE UP (ref 40–120)
ALT FLD-CCNC: 15 U/L — SIGNIFICANT CHANGE UP
ANION GAP SERPL CALC-SCNC: 15 MMOL/L — SIGNIFICANT CHANGE UP (ref 5–17)
APTT BLD: 45.7 SEC — HIGH (ref 24.5–35.6)
AST SERPL-CCNC: 20 U/L — SIGNIFICANT CHANGE UP
BASOPHILS # BLD AUTO: 0.02 K/UL — SIGNIFICANT CHANGE UP (ref 0–0.2)
BASOPHILS NFR BLD AUTO: 0.3 % — SIGNIFICANT CHANGE UP (ref 0–2)
BILIRUB SERPL-MCNC: 0.3 MG/DL — LOW (ref 0.4–2)
BUN SERPL-MCNC: 25.7 MG/DL — HIGH (ref 8–20)
CALCIUM SERPL-MCNC: 9.9 MG/DL — SIGNIFICANT CHANGE UP (ref 8.4–10.5)
CHLORIDE SERPL-SCNC: 98 MMOL/L — SIGNIFICANT CHANGE UP (ref 96–108)
CO2 SERPL-SCNC: 24 MMOL/L — SIGNIFICANT CHANGE UP (ref 22–29)
CREAT SERPL-MCNC: 1.1 MG/DL — SIGNIFICANT CHANGE UP (ref 0.5–1.3)
EGFR: 56 ML/MIN/1.73M2 — LOW
EOSINOPHIL # BLD AUTO: 0.14 K/UL — SIGNIFICANT CHANGE UP (ref 0–0.5)
EOSINOPHIL NFR BLD AUTO: 2.2 % — SIGNIFICANT CHANGE UP (ref 0–6)
GLUCOSE SERPL-MCNC: 172 MG/DL — HIGH (ref 70–99)
HCT VFR BLD CALC: 33.6 % — LOW (ref 34.5–45)
HGB BLD-MCNC: 10.7 G/DL — LOW (ref 11.5–15.5)
IMM GRANULOCYTES NFR BLD AUTO: 0.3 % — SIGNIFICANT CHANGE UP (ref 0–0.9)
INR BLD: 1.81 RATIO — HIGH (ref 0.85–1.18)
LIDOCAIN IGE QN: 37 U/L — SIGNIFICANT CHANGE UP (ref 22–51)
LYMPHOCYTES # BLD AUTO: 1.22 K/UL — SIGNIFICANT CHANGE UP (ref 1–3.3)
LYMPHOCYTES # BLD AUTO: 19.2 % — SIGNIFICANT CHANGE UP (ref 13–44)
MAGNESIUM SERPL-MCNC: 1.7 MG/DL — SIGNIFICANT CHANGE UP (ref 1.6–2.6)
MCHC RBC-ENTMCNC: 24.8 PG — LOW (ref 27–34)
MCHC RBC-ENTMCNC: 31.8 GM/DL — LOW (ref 32–36)
MCV RBC AUTO: 78 FL — LOW (ref 80–100)
MONOCYTES # BLD AUTO: 0.49 K/UL — SIGNIFICANT CHANGE UP (ref 0–0.9)
MONOCYTES NFR BLD AUTO: 7.7 % — SIGNIFICANT CHANGE UP (ref 2–14)
NEUTROPHILS # BLD AUTO: 4.45 K/UL — SIGNIFICANT CHANGE UP (ref 1.8–7.4)
NEUTROPHILS NFR BLD AUTO: 70.3 % — SIGNIFICANT CHANGE UP (ref 43–77)
PLATELET # BLD AUTO: 272 K/UL — SIGNIFICANT CHANGE UP (ref 150–400)
POTASSIUM SERPL-MCNC: 4 MMOL/L — SIGNIFICANT CHANGE UP (ref 3.5–5.3)
POTASSIUM SERPL-SCNC: 4 MMOL/L — SIGNIFICANT CHANGE UP (ref 3.5–5.3)
PROT SERPL-MCNC: 7.5 G/DL — SIGNIFICANT CHANGE UP (ref 6.6–8.7)
PROTHROM AB SERPL-ACNC: 19.7 SEC — HIGH (ref 9.5–13)
RBC # BLD: 4.31 M/UL — SIGNIFICANT CHANGE UP (ref 3.8–5.2)
RBC # FLD: 17.5 % — HIGH (ref 10.3–14.5)
SODIUM SERPL-SCNC: 137 MMOL/L — SIGNIFICANT CHANGE UP (ref 135–145)
TROPONIN T, HIGH SENSITIVITY RESULT: 14 NG/L — SIGNIFICANT CHANGE UP (ref 0–51)
WBC # BLD: 6.34 K/UL — SIGNIFICANT CHANGE UP (ref 3.8–10.5)
WBC # FLD AUTO: 6.34 K/UL — SIGNIFICANT CHANGE UP (ref 3.8–10.5)

## 2024-03-09 PROCEDURE — 83690 ASSAY OF LIPASE: CPT

## 2024-03-09 PROCEDURE — 70450 CT HEAD/BRAIN W/O DYE: CPT | Mod: MC

## 2024-03-09 PROCEDURE — 80053 COMPREHEN METABOLIC PANEL: CPT

## 2024-03-09 PROCEDURE — 99285 EMERGENCY DEPT VISIT HI MDM: CPT | Mod: 25

## 2024-03-09 PROCEDURE — 36415 COLL VENOUS BLD VENIPUNCTURE: CPT

## 2024-03-09 PROCEDURE — 70450 CT HEAD/BRAIN W/O DYE: CPT | Mod: 26,MC

## 2024-03-09 PROCEDURE — 71045 X-RAY EXAM CHEST 1 VIEW: CPT | Mod: 26

## 2024-03-09 PROCEDURE — 85610 PROTHROMBIN TIME: CPT

## 2024-03-09 PROCEDURE — 85025 COMPLETE CBC W/AUTO DIFF WBC: CPT

## 2024-03-09 PROCEDURE — 93010 ELECTROCARDIOGRAM REPORT: CPT

## 2024-03-09 PROCEDURE — 71045 X-RAY EXAM CHEST 1 VIEW: CPT

## 2024-03-09 PROCEDURE — 83735 ASSAY OF MAGNESIUM: CPT

## 2024-03-09 PROCEDURE — 84484 ASSAY OF TROPONIN QUANT: CPT

## 2024-03-09 PROCEDURE — 93005 ELECTROCARDIOGRAM TRACING: CPT

## 2024-03-09 PROCEDURE — 99285 EMERGENCY DEPT VISIT HI MDM: CPT | Mod: GC

## 2024-03-09 PROCEDURE — 85730 THROMBOPLASTIN TIME PARTIAL: CPT

## 2024-03-09 PROCEDURE — 82962 GLUCOSE BLOOD TEST: CPT

## 2024-03-09 NOTE — ED ADULT NURSE REASSESSMENT NOTE - NS ED NURSE REASSESS COMMENT FT1
Patient a&ox3, no acute distress, resp nonlabored, resting comfortably in bed.  Denies headache, dizziness, chest pain, palpitations, SOB, abd pain, n/v/d, urinary symptoms, fevers, chills, weakness at this time.. Remains on continuous cardiac monitoring nsr,  wdl on RA. Updated on plan of care. Safety maintained.

## 2024-03-09 NOTE — ED PROVIDER NOTE - CLINICAL SUMMARY MEDICAL DECISION MAKING FREE TEXT BOX
Will check CT head, US for L eye. Will check cbc, cmp, coags, trop T, lipase, mg, cxr, UA. ECG. Unlikely stroke, Likely ophthalmology issues. Symptom management and reassess.

## 2024-03-09 NOTE — ED PROVIDER NOTE - ATTENDING CONTRIBUTION TO CARE
I, Pete Louis, performed a face to face bedside interview with this patient regarding history of present illness, and completed an independent physical examination. I personally made/approved the management plan and take responsibility for the patient management. I have communicated the patient’s plan of care and disposition with the resident.  64-year-old female past med history of hypertension, hyperlipidemia, DVT on Eliquis, glaucoma presents with floaters.  Patient reports that 1 hour prior to arrival she began to have wavy black lines in her vision from her left eye.  These occurred just in the left eye, and in all fields of vision.  There is no slurred speech, facial droop, eye pain or redness  Gen: NAD, well appearing  eye:  cornea clear, PERRLA, extraocular muscles intact, intraocular pressure 20,  visual acuity 20/40 on the left and 20/25 on the right  CV: RRR  Pul: CTA b/l  Abd: Soft, non-distended, non-tender  Neuro: no focal deficits   the symptoms are monocular, and all field of vision's and not affecting any singular specific visual field.  There is no other neurologic deficits, and currently her NIH score is 0.  This is most likely consistent with an ophthalmologic process, and not neurologic.  I personally called sight MD, which the patient has a prior relationship with.  They will see the patient in the  [place] office which she usually goes to at 11 AM on Monday

## 2024-03-09 NOTE — ED ADULT NURSE NOTE - CAS EDN DISCHARGE INTERVENTIONS
Niacinamide Counseling: I recommended taking niacin or niacinamide, also know as vitamin B3, twice daily. Recent evidence suggests that taking vitamin B3 (500 mg twice daily) can reduce the risk of actinic keratoses and non-melanoma skin cancers. Side effects of vitamin B3 include flushing and headache. IV discontinued, cath removed intact

## 2024-03-09 NOTE — ED PROVIDER NOTE - PATIENT PORTAL LINK FT
You can access the FollowMyHealth Patient Portal offered by [facility] by registering at the following website: [email or web address]. By joining [facility]’s FollowMyHealth portal, you will also be able to view your health information using other applications (apps) compatible with our system.

## 2024-03-09 NOTE — ED ADULT NURSE NOTE - NSFALLHARMRISKINTERV_ED_ALL_ED

## 2024-03-09 NOTE — ED ADULT NURSE NOTE - OBJECTIVE STATEMENT
Pt received in critical care.  Pt with "black lines" in her vision of her left eye at 1600. Code stroke initiated.   No other sx.  NIH 0.  NAD noted, respirations even and unlabored.  Safety precautions in place.  Plan of care explained, pt verbalized understanding. MD Louis at bedside for eval.

## 2024-03-09 NOTE — ED ADULT TRIAGE NOTE - CHIEF COMPLAINT QUOTE
" I have a black fluid in my eye that started 30 minutes ago, I see lack lines in my left eye" pt also co chest pressure. Denies unsteady gait, dizziness, weakness.

## 2024-03-09 NOTE — ED PROVIDER NOTE - PHYSICAL EXAMINATION
VITALS: reviewed  GEN: No apparent distress, A & O x 4  HEAD/EYES: NC/AT, PERRL, EOMI, anicteric sclerae, no conjunctival pallor  ENT: mucus membranes moist, trachea midline, no JVD, neck is supple  RESP: lungs CTA with equal breath sounds bilaterally, chest wall nontender and atraumatic  CV: heart with reg rhythm S1, S2, no murmur; distal pulses intact and symmetric bilaterally  ABDOMEN: normoactive bowel sounds, soft, nondistended, nontender, no palpable masses  : no CVAT  MSK: extremities atraumatic and nontender, no edema, no asymmetry. Neck/ back are without midline or lateral tenderness, there is no spinal deformity or stepoff and the back is ranged painlessly.   SKIN: warm, dry, no rash, no bruising, no cyanosis.  NEURO: alert, mentating appropriately, no facial asymmetry. gross sensation, motor, coordination are intact  PSYCH: Affect appropriate

## 2024-03-09 NOTE — ED PROVIDER NOTE - OBJECTIVE STATEMENT
A 65 yo F with pmh of HTN, HLD, DVT in LE on Eliquis, DM, presents to the ED for floating vision on L eye. Pt reports seeing black line in L eye. Reports also chest pressure. Denies HA, N/V, SOB, facial numbness, neck pain, back pain, fevers, chills, palpitations, cough, nausea, vomiting, diarrhea, hematuria, dysuria, dark stools, any focal neurologic symptoms.

## 2024-03-09 NOTE — ED PROVIDER NOTE - CARE PLAN
Principal Discharge DX:	Distortion of visual image of left eye   1 Principal Discharge DX:	Floaters, left

## 2024-03-26 LAB
ALBUMIN SERPL ELPH-MCNC: 4 G/DL
ALP BLD-CCNC: 55 U/L
ALT SERPL-CCNC: 10 U/L
ANION GAP SERPL CALC-SCNC: 12 MMOL/L
APPEARANCE: CLEAR
AST SERPL-CCNC: 13 U/L
BILIRUB SERPL-MCNC: 0.2 MG/DL
BILIRUBIN URINE: NEGATIVE
BLOOD URINE: NEGATIVE
BUN SERPL-MCNC: 26 MG/DL
CALCIUM SERPL-MCNC: 10.2 MG/DL
CHLORIDE SERPL-SCNC: 102 MMOL/L
CO2 SERPL-SCNC: 26 MMOL/L
COLOR: YELLOW
CREAT SERPL-MCNC: 1.13 MG/DL
CREAT SPEC-SCNC: 69 MG/DL
EGFR: 54 ML/MIN/1.73M2
GLUCOSE QUALITATIVE U: NEGATIVE MG/DL
GLUCOSE SERPL-MCNC: 89 MG/DL
KETONES URINE: NEGATIVE MG/DL
LEUKOCYTE ESTERASE URINE: NEGATIVE
MICROALBUMIN 24H UR DL<=1MG/L-MCNC: <1.2 MG/DL
MICROALBUMIN/CREAT 24H UR-RTO: NORMAL MG/G
NITRITE URINE: NEGATIVE
PH URINE: 6
POTASSIUM SERPL-SCNC: 4.8 MMOL/L
PROT SERPL-MCNC: 7 G/DL
PROTEIN URINE: NEGATIVE MG/DL
SODIUM SERPL-SCNC: 140 MMOL/L
SPECIFIC GRAVITY URINE: 1.02
URATE SERPL-MCNC: 9.7 MG/DL
UROBILINOGEN URINE: 0.2 MG/DL

## 2024-04-07 ENCOUNTER — EMERGENCY (EMERGENCY)
Facility: HOSPITAL | Age: 64
LOS: 1 days | Discharge: HOME | End: 2024-04-07
Attending: EMERGENCY MEDICINE
Payer: MEDICARE

## 2024-04-07 VITALS
TEMPERATURE: 98 F | RESPIRATION RATE: 18 BRPM | HEIGHT: 62 IN | DIASTOLIC BLOOD PRESSURE: 75 MMHG | HEART RATE: 95 BPM | SYSTOLIC BLOOD PRESSURE: 136 MMHG | OXYGEN SATURATION: 100 % | WEIGHT: 173.5 LBS

## 2024-04-07 PROCEDURE — 93010 ELECTROCARDIOGRAM REPORT: CPT

## 2024-04-07 PROCEDURE — 99285 EMERGENCY DEPT VISIT HI MDM: CPT | Mod: GC

## 2024-04-07 NOTE — ED ADULT TRIAGE NOTE - CHIEF COMPLAINT QUOTE
Pt c/o chest pain that radiates to her L arm. Also states she was unable to sleep tonight due to congestion.

## 2024-04-08 LAB
ALBUMIN SERPL ELPH-MCNC: 3.7 G/DL — SIGNIFICANT CHANGE UP (ref 3.3–5.2)
ALP SERPL-CCNC: 52 U/L — SIGNIFICANT CHANGE UP (ref 40–120)
ALT FLD-CCNC: 12 U/L — SIGNIFICANT CHANGE UP
ANION GAP SERPL CALC-SCNC: 11 MMOL/L — SIGNIFICANT CHANGE UP (ref 5–17)
AST SERPL-CCNC: 21 U/L — SIGNIFICANT CHANGE UP
BASOPHILS # BLD AUTO: 0.01 K/UL — SIGNIFICANT CHANGE UP (ref 0–0.2)
BASOPHILS NFR BLD AUTO: 0.2 % — SIGNIFICANT CHANGE UP (ref 0–2)
BILIRUB SERPL-MCNC: 0.2 MG/DL — LOW (ref 0.4–2)
BUN SERPL-MCNC: 26.3 MG/DL — HIGH (ref 8–20)
CALCIUM SERPL-MCNC: 9.9 MG/DL — SIGNIFICANT CHANGE UP (ref 8.4–10.5)
CHLORIDE SERPL-SCNC: 103 MMOL/L — SIGNIFICANT CHANGE UP (ref 96–108)
CO2 SERPL-SCNC: 25 MMOL/L — SIGNIFICANT CHANGE UP (ref 22–29)
CREAT SERPL-MCNC: 0.87 MG/DL — SIGNIFICANT CHANGE UP (ref 0.5–1.3)
EGFR: 74 ML/MIN/1.73M2 — SIGNIFICANT CHANGE UP
EOSINOPHIL # BLD AUTO: 0.08 K/UL — SIGNIFICANT CHANGE UP (ref 0–0.5)
EOSINOPHIL NFR BLD AUTO: 1.6 % — SIGNIFICANT CHANGE UP (ref 0–6)
GLUCOSE SERPL-MCNC: 97 MG/DL — SIGNIFICANT CHANGE UP (ref 70–99)
HCT VFR BLD CALC: 30.4 % — LOW (ref 34.5–45)
HGB BLD-MCNC: 9.8 G/DL — LOW (ref 11.5–15.5)
IMM GRANULOCYTES NFR BLD AUTO: 0.4 % — SIGNIFICANT CHANGE UP (ref 0–0.9)
LYMPHOCYTES # BLD AUTO: 0.9 K/UL — LOW (ref 1–3.3)
LYMPHOCYTES # BLD AUTO: 17.6 % — SIGNIFICANT CHANGE UP (ref 13–44)
MCHC RBC-ENTMCNC: 25.1 PG — LOW (ref 27–34)
MCHC RBC-ENTMCNC: 32.2 GM/DL — SIGNIFICANT CHANGE UP (ref 32–36)
MCV RBC AUTO: 77.7 FL — LOW (ref 80–100)
MONOCYTES # BLD AUTO: 0.4 K/UL — SIGNIFICANT CHANGE UP (ref 0–0.9)
MONOCYTES NFR BLD AUTO: 7.8 % — SIGNIFICANT CHANGE UP (ref 2–14)
NEUTROPHILS # BLD AUTO: 3.69 K/UL — SIGNIFICANT CHANGE UP (ref 1.8–7.4)
NEUTROPHILS NFR BLD AUTO: 72.4 % — SIGNIFICANT CHANGE UP (ref 43–77)
PLATELET # BLD AUTO: 245 K/UL — SIGNIFICANT CHANGE UP (ref 150–400)
POTASSIUM SERPL-MCNC: 4.4 MMOL/L — SIGNIFICANT CHANGE UP (ref 3.5–5.3)
POTASSIUM SERPL-SCNC: 4.4 MMOL/L — SIGNIFICANT CHANGE UP (ref 3.5–5.3)
PROT SERPL-MCNC: 7.1 G/DL — SIGNIFICANT CHANGE UP (ref 6.6–8.7)
RBC # BLD: 3.91 M/UL — SIGNIFICANT CHANGE UP (ref 3.8–5.2)
RBC # FLD: 16.5 % — HIGH (ref 10.3–14.5)
SODIUM SERPL-SCNC: 139 MMOL/L — SIGNIFICANT CHANGE UP (ref 135–145)
TROPONIN T, HIGH SENSITIVITY RESULT: 14 NG/L — SIGNIFICANT CHANGE UP (ref 0–51)
TROPONIN T, HIGH SENSITIVITY RESULT: 14 NG/L — SIGNIFICANT CHANGE UP (ref 0–51)
WBC # BLD: 5.1 K/UL — SIGNIFICANT CHANGE UP (ref 3.8–10.5)
WBC # FLD AUTO: 5.1 K/UL — SIGNIFICANT CHANGE UP (ref 3.8–10.5)

## 2024-04-08 PROCEDURE — 85025 COMPLETE CBC W/AUTO DIFF WBC: CPT

## 2024-04-08 PROCEDURE — 80053 COMPREHEN METABOLIC PANEL: CPT

## 2024-04-08 PROCEDURE — 84484 ASSAY OF TROPONIN QUANT: CPT

## 2024-04-08 PROCEDURE — 71045 X-RAY EXAM CHEST 1 VIEW: CPT

## 2024-04-08 PROCEDURE — 99285 EMERGENCY DEPT VISIT HI MDM: CPT | Mod: 25

## 2024-04-08 PROCEDURE — 71045 X-RAY EXAM CHEST 1 VIEW: CPT | Mod: 26

## 2024-04-08 PROCEDURE — 93005 ELECTROCARDIOGRAM TRACING: CPT

## 2024-04-08 PROCEDURE — 36415 COLL VENOUS BLD VENIPUNCTURE: CPT

## 2024-04-08 NOTE — ED PROVIDER NOTE - CLINICAL SUMMARY MEDICAL DECISION MAKING FREE TEXT BOX
Pt is a 65yo female with PMH of HTN, HLD, DVT on eliquis, DM, sarcoidosis presenting with chest pain. Pt reports that she has had chest pain starting yesterday, from L axilla to sternal  and then to R axilla. Pt reports that she also had congestion tonight preventing her from sleeping, which brings her into the ED. Pt denies fever, chills, SOB, abdominal pain, N/V/D.    EKG NSR unremarkable. Vitals stable, exam unremarkable.    ACS workup. Pt is a 65yo female with PMH of HTN, HLD, DVT on eliquis, DM, sarcoidosis presenting with chest pain. Pt reports that she has had intermittent chest pain starting yesterday, from L axilla to sternal  and then to R axilla. Pt reports that she also had congestion tonight preventing her from sleeping, which brings her into the ED. Pt denies fever, chills, SOB, abdominal pain, N/V/D. EKG NSR unremarkable. Vitals stable, exam unremarkable. HEART score 3.  reproducible left costochondral tenderness palpation.  Delta high-sensitivity troponin negative.  Patient has cardiologist to follow-up with.  Feeling better in the ED.  Chest x-ray within normal limits.  Ready for discharge, return precautions given.

## 2024-04-08 NOTE — ED PROVIDER NOTE - PHYSICAL EXAMINATION
General: NAD, well appearing  HEENT: Normocephalic, atraumatic  Neck: No apparent stiffness or JVD  Pulm: Chest wall symmetric and nontender, lungs clear to ascultation   Cardiac: Regular rate and regular rhythm  Abdomen: Nontender and nondistended  Skin: Skin is warm, dry and intact without rashes or lesions.  Neuro: AO4, No motor or sensory deficits above reported baseline  MSK: No deformity or tenderness above reported baseline

## 2024-04-08 NOTE — ED PROVIDER NOTE - PATIENT PORTAL LINK FT
You can access the FollowMyHealth Patient Portal offered by [facility] by registering at the following website: [email or web address]. By joining [facility]’s FollowMyHealth portal, you will also be able to view your health information using other applications (apps) compatible with our system.

## 2024-04-08 NOTE — ED ADULT NURSE NOTE - OBJECTIVE STATEMENT
Pt presents to ED for chest pain that started yesterday. States that chest pain worsens when she is outside. Pt reports she is also congested which started tonight preventing her to sleep. Pt NSR on cardiac monitor. Denies SOB and N/V/D.

## 2024-04-08 NOTE — ED PROVIDER NOTE - OBJECTIVE STATEMENT
Pt is a 63yo female with PMH of HTN, HLD, DVT on eliquis, DM, sarcoidosis presenting with chest pain. Pt reports that she has had chest pain starting yesterday, from L axilla to sternal  and then to R axilla. Pt reports that she also had congestion tonight preventing her from sleeping, which brings her into the ED. Pt denies fever, chills, SOB, abdominal pain, N/V/D. Pt is a 65yo female with PMH of HTN, HLD, DVT on eliquis, DM, anemia, sarcoidosis presenting with chest pain. Pt reports that she has had chest pain starting yesterday, from L axilla to sternal  and then to R axilla. Pt reports that she also had congestion tonight preventing her from sleeping, which brings her into the ED.  Pt went to see her hematologist to follow up anemia recently, pt was found to have elevated calcium and an elevated "lung enzyme". Pt denies fever, chills, SOB, abdominal pain, N/V/D.

## 2024-04-08 NOTE — ED PROVIDER NOTE - NSFOLLOWUPCLINICS_GEN_ALL_ED_FT
Cardiology at [place] ([facility])  Cardiology  [address], [address]  [place] [address]  Phone: [phone]  Fax:

## 2024-04-08 NOTE — ED ADULT NURSE NOTE - HISTORY OF COVID-19 VACCINATION
September 1, 2021      Andre Kennedy  [address]  [facility] [address]        Dear Parent or Guardian of Andre Kennedy    We are writing to inform you of your child's test results.    Resulted Orders   TSH with free T4 reflex   Result Value Ref Range    TSH 3.13 0.30 - 5.00 uIU/mL   CBC with platelets   Result Value Ref Range    WBC Count 9.2 5.0 - 14.5 10e3/uL    RBC Count 4.72 3.70 - 5.30 10e6/uL    Hemoglobin 12.1 10.5 - 14.0 g/dL    Hematocrit 37.8 31.5 - 43.0 %    MCV 80 70 - 100 fL    MCH 25.6 (L) 26.5 - 33.0 pg    MCHC 32.0 31.5 - 36.5 g/dL    RDW 12.7 10.0 - 15.0 %    Platelet Count 290 150 - 450 10e3/uL   Comprehensive metabolic panel (BMP + Alb, Alk Phos, ALT, AST, Total. Bili, TP)   Result Value Ref Range    Sodium 138 136 - 145 mmol/L    Potassium 3.9 3.5 - 5.0 mmol/L    Chloride 104 98 - 107 mmol/L    Carbon Dioxide (CO2) 22 22 - 31 mmol/L    Anion Gap 12 5 - 18 mmol/L    Urea Nitrogen 8 (L) 9 - 18 mg/dL    Creatinine 0.56 0.20 - 0.70 mg/dL    Calcium 9.9 9.0 - 10.4 mg/dL    Glucose 89 84 - 110 mg/dL    Alkaline Phosphatase 184 50 - 477 U/L    AST 21 0 - 40 U/L    ALT <9 0 - 45 U/L    Protein Total 7.8 6.6 - 8.3 g/dL    Albumin 4.0 3.5 - 5.2 g/dL    Bilirubin Total 0.2 0.0 - 1.0 mg/dL    GFR Estimate        Comment:      GFR not calculated, patient <18 years old.  As of July 11, 2021, eGFR is calculated by the CKD-EPI creatinine equation, without race adjustment. eGFR can be influenced by muscle mass, exercise, and diet. The reported eGFR is an estimation only and is only applicable if the renal function is stable.   Magnesium   Result Value Ref Range    Magnesium 2.2 1.8 - 2.6 mg/dL   Prealbumin   Result Value Ref Range    Prealbumin 18 12 - 33 mg/dL     Labs were all normal.  Please also schedule a weight check nurse visit in 3 months due to his low weight/BMI. We should continue to monitor this closely and ensure his weight is at least stable.       If you have any  questions or concerns, please call the clinic at the number listed above.       Sincerely,        Carmina Okeefe, DO                 Vaccine status unknown

## 2024-04-08 NOTE — ED PROVIDER NOTE - ATTENDING CONTRIBUTION TO CARE
Amy: I performed a face to face bedside interview with patient regarding history of present illness, review of symptoms and past medical history. I completed an independent physical exam.  I have discussed patient's plan of care with resident.   I agree with note as stated above including HISTORY OF PRESENT ILLNESS, HIV, PAST MEDICAL/SURGICAL/FAMILY/SOCIAL HISTORY, ALLERGIES AND HOME MEDICATIONS, REVIEW OF SYSTEMS, PHYSICAL EXAM, MEDICAL DECISION MAKING and any PROGRESS NOTES during the time I functioned as the attending physician for this patient unless otherwise noted. My brief assessment is as follows: Pt is a 65yo female with PMH of HTN, HLD, DVT on eliquis, DM, sarcoidosis presenting with chest pain. Pt reports that she has had intermittent chest pain starting yesterday, from L axilla to sternal  and then to R axilla. Pt reports that she also had congestion tonight preventing her from sleeping, which brings her into the ED. Pt denies fever, chills, SOB, abdominal pain, N/V/D. EKG NSR unremarkable. Vitals stable, exam unremarkable. HEART score 3.  reproducible left costochondral tenderness palpation.  Delta high-sensitivity troponin negative.  Patient has cardiologist to follow-up with.  Feeling better in the ED.  Chest x-ray within normal limits.  Ready for discharge, return precautions given.

## 2024-04-08 NOTE — ED ADULT NURSE NOTE - NS_NURSE_DISC_ED_ALL_ED_PROVIDEDBY
Azelaic Acid Pregnancy And Lactation Text: This medication is considered safe during pregnancy and breast feeding. ED MD

## 2024-04-10 ENCOUNTER — APPOINTMENT (OUTPATIENT)
Dept: ORTHOPEDIC SURGERY | Facility: CLINIC | Age: 64
End: 2024-04-10
Payer: MEDICAID

## 2024-04-10 VITALS
BODY MASS INDEX: 32.1 KG/M2 | DIASTOLIC BLOOD PRESSURE: 70 MMHG | WEIGHT: 170 LBS | SYSTOLIC BLOOD PRESSURE: 106 MMHG | HEART RATE: 106 BPM | HEIGHT: 61 IN

## 2024-04-10 DIAGNOSIS — M17.11 UNILATERAL PRIMARY OSTEOARTHRITIS, RIGHT KNEE: ICD-10-CM

## 2024-04-10 PROCEDURE — 99213 OFFICE O/P EST LOW 20 MIN: CPT

## 2024-04-10 NOTE — DISCUSSION/SUMMARY
[Medication Risks Reviewed] : Medication risks reviewed [de-identified] : patient is a 64-year-old female with severe right knee patellofemoral arthritis presenting today for follow-up.  She has had good response to conservative treatment.  She will continue with that at this time.  A new prescription for physical therapy was provided today.  She will continue with low impact activity and exercise.  Continue take Tylenol and NSAIDs as needed for the pain.  She will follow-up on an as-needed basis possible repeat injection in the future.  All questions were asked and answered

## 2024-04-10 NOTE — PHYSICAL EXAM
[de-identified] : GENERAL APPEARANCE: Well nourished and hydrated, pleasant, alert, and oriented x 3. Appears their stated age.  HEENT: Normocephalic, extraocular eye motion intact. Nasal septum midline. Oral cavity clear. External auditory canal clear.  RESPIRATORY: Breath sounds clear and audible in all lobes. No wheezing, No accessory muscle use. CARDIOVASCULAR: No apparent abnormalities. No lower leg edema. No varicosities. Pedal pulses are palpable. NEUROLOGIC: Sensation is normal, no muscle weakness in the upper or lower extremities. DERMATOLOGIC: No apparent skin lesions, moist, warm, no rash. SPINE: Cervical spine appears normal and moves freely; thoracic spine appears normal and moves freely; lumbosacral spine appears normal and moves freely, normal, nontender. MUSCULOSKELETAL: Hands, wrists, and elbows are normal and move freely, shoulders are normal and move freely.  PSYCHIATRIC: Oriented to person, place, and time, insight and judgement were intact and the affect was normal. [de-identified] : Right knee exam shows moderate effusion, ROM is 0-100 degrees, no instability, pain with [place], medial and lateral joint line tenderness.  5/5 motor strength in bilateral lower extremities. Sensory: Intact in bilateral lower extremities. DTRs: Biceps, brachioradialis, triceps, patellar, ankle and plantar 2+ and symmetric bilaterally. Pulses: dorsalis pedis, posterior tibial, femoral, popliteal, and radial 2+ and symmetric bilaterally.

## 2024-04-10 NOTE — HISTORY OF PRESENT ILLNESS
[de-identified] : Patient is a 64-year-old female here today for follow-up of her right knee osteoarthritis.  She had good response to cortisone injection she received her last visit.  She has been in physical therapy.  She states this helped very much.  She is happy with her progress

## 2024-04-29 ENCOUNTER — NON-APPOINTMENT (OUTPATIENT)
Age: 64
End: 2024-04-29

## 2024-04-29 LAB
25(OH)D3 SERPL-MCNC: 20.5 NG/ML
ALBUMIN SERPL ELPH-MCNC: 4.1 G/DL
ALP BLD-CCNC: 53 U/L
ALT SERPL-CCNC: 15 U/L
ANION GAP SERPL CALC-SCNC: 13 MMOL/L
AST SERPL-CCNC: 17 U/L
BASOPHILS # BLD AUTO: 0.02 K/UL
BASOPHILS NFR BLD AUTO: 0.4 %
BILIRUB SERPL-MCNC: 0.3 MG/DL
BUN SERPL-MCNC: 23 MG/DL
CALCIUM SERPL-MCNC: 10.6 MG/DL
CHLORIDE SERPL-SCNC: 104 MMOL/L
CO2 SERPL-SCNC: 24 MMOL/L
CREAT SERPL-MCNC: 1.17 MG/DL
CRP SERPL-MCNC: 19 MG/L
EGFR: 52 ML/MIN/1.73M2
EOSINOPHIL # BLD AUTO: 0.1 K/UL
EOSINOPHIL NFR BLD AUTO: 2.1 %
ERYTHROCYTE [SEDIMENTATION RATE] IN BLOOD BY WESTERGREN METHOD: 118 MM/HR
GLUCOSE SERPL-MCNC: 85 MG/DL
HCT VFR BLD CALC: 31.7 %
HGB BLD-MCNC: 9.7 G/DL
IMM GRANULOCYTES NFR BLD AUTO: 0.4 %
LYMPHOCYTES # BLD AUTO: 1.13 K/UL
LYMPHOCYTES NFR BLD AUTO: 23.7 %
MAN DIFF?: NORMAL
MCHC RBC-ENTMCNC: 24.5 PG
MCHC RBC-ENTMCNC: 30.6 GM/DL
MCV RBC AUTO: 80.1 FL
MONOCYTES # BLD AUTO: 0.42 K/UL
MONOCYTES NFR BLD AUTO: 8.8 %
NEUTROPHILS # BLD AUTO: 3.08 K/UL
NEUTROPHILS NFR BLD AUTO: 64.6 %
PLATELET # BLD AUTO: 309 K/UL
POTASSIUM SERPL-SCNC: 5 MMOL/L
PROT SERPL-MCNC: 7.1 G/DL
RBC # BLD: 3.96 M/UL
RBC # FLD: 15.9 %
SODIUM SERPL-SCNC: 141 MMOL/L
URATE SERPL-MCNC: 9.6 MG/DL
WBC # FLD AUTO: 4.77 K/UL

## 2024-04-30 ENCOUNTER — OUTPATIENT (OUTPATIENT)
Dept: OUTPATIENT SERVICES | Facility: HOSPITAL | Age: 64
LOS: 1 days | End: 2024-04-30
Payer: MEDICAID

## 2024-04-30 ENCOUNTER — APPOINTMENT (OUTPATIENT)
Dept: RADIOLOGY | Facility: CLINIC | Age: 64
End: 2024-04-30
Payer: MEDICAID

## 2024-04-30 DIAGNOSIS — E78.5 HYPERLIPIDEMIA, UNSPECIFIED: ICD-10-CM

## 2024-04-30 DIAGNOSIS — E11.22 TYPE 2 DIABETES MELLITUS WITH DIABETIC CHRONIC KIDNEY DISEASE: ICD-10-CM

## 2024-04-30 DIAGNOSIS — E55.9 VITAMIN D DEFICIENCY, UNSPECIFIED: ICD-10-CM

## 2024-04-30 DIAGNOSIS — E83.52 HYPERCALCEMIA: ICD-10-CM

## 2024-04-30 DIAGNOSIS — I10 ESSENTIAL (PRIMARY) HYPERTENSION: ICD-10-CM

## 2024-04-30 LAB
24R-OH-CALCIDIOL SERPL-MCNC: 33.6 PG/ML
ACE BLD-CCNC: 27 U/L

## 2024-04-30 PROCEDURE — 77080 DXA BONE DENSITY AXIAL: CPT

## 2024-04-30 PROCEDURE — 77080 DXA BONE DENSITY AXIAL: CPT | Mod: 26

## 2024-05-01 ENCOUNTER — APPOINTMENT (OUTPATIENT)
Dept: RHEUMATOLOGY | Facility: CLINIC | Age: 64
End: 2024-05-01
Payer: MEDICAID

## 2024-05-01 VITALS
RESPIRATION RATE: 17 BRPM | HEIGHT: 61 IN | DIASTOLIC BLOOD PRESSURE: 82 MMHG | TEMPERATURE: 97.2 F | HEART RATE: 88 BPM | OXYGEN SATURATION: 98 % | SYSTOLIC BLOOD PRESSURE: 124 MMHG

## 2024-05-01 DIAGNOSIS — E11.22 TYPE 2 DIABETES MELLITUS WITH DIABETIC CHRONIC KIDNEY DISEASE: ICD-10-CM

## 2024-05-01 DIAGNOSIS — I82.4Z2 ACUTE EMBOLISM AND THROMBOSIS OF UNSPECIFIED DEEP VEINS OF LEFT DISTAL LOWER EXTREMITY: ICD-10-CM

## 2024-05-01 DIAGNOSIS — M10.072 IDIOPATHIC GOUT, LEFT ANKLE AND FOOT: ICD-10-CM

## 2024-05-01 DIAGNOSIS — M25.50 PAIN IN UNSPECIFIED JOINT: ICD-10-CM

## 2024-05-01 DIAGNOSIS — M10.9 GOUT, UNSPECIFIED: ICD-10-CM

## 2024-05-01 DIAGNOSIS — M17.0 BILATERAL PRIMARY OSTEOARTHRITIS OF KNEE: ICD-10-CM

## 2024-05-01 PROCEDURE — 99215 OFFICE O/P EST HI 40 MIN: CPT

## 2024-05-01 PROCEDURE — G2211 COMPLEX E/M VISIT ADD ON: CPT | Mod: NC,1L

## 2024-05-02 PROBLEM — M17.0 PRIMARY OSTEOARTHRITIS OF BOTH KNEES: Status: ACTIVE | Noted: 2017-10-25

## 2024-05-02 PROBLEM — I82.4Z2 ACUTE DEEP VEIN THROMBOSIS (DVT) OF DISTAL VEIN OF LEFT LOWER EXTREMITY: Status: RESOLVED | Noted: 2023-06-28 | Resolved: 2024-05-02

## 2024-05-02 PROBLEM — E11.22 CONTROLLED TYPE 2 DIABETES MELLITUS WITH CHRONIC KIDNEY DISEASE, WITHOUT LONG-TERM CURRENT USE OF INSULIN, UNSPECIFIED CKD STAGE: Status: ACTIVE | Noted: 2021-07-14

## 2024-05-02 PROBLEM — M25.50 POLYARTHRALGIA: Status: ACTIVE | Noted: 2023-06-28

## 2024-05-02 NOTE — PHYSICAL EXAM
[General Appearance - Alert] : alert [General Appearance - In No Acute Distress] : in no acute distress [General Appearance - Well Nourished] : well nourished [General Appearance - Well Developed] : well developed [General Appearance - Well-Appearing] : healthy appearing [Sclera] : the sclera and conjunctiva were normal [Extraocular Movements] : extraocular movements were intact [Outer Ear] : the ears and nose were normal in appearance [Hearing Threshold Finger Rub Not [place]] : hearing was normal [Examination Of The Oral Cavity] : the lips and gums were normal [Nasal Cavity] : the nasal mucosa and septum were normal [Oropharynx] : the oropharynx was normal [Neck Appearance] : the appearance of the neck was normal [Neck Cervical Mass (___cm)] : no neck mass was observed [Jugular Venous Distention Increased] : there was no jugular-venous distention [Thyroid Diffuse Enlargement] : the thyroid was not enlarged [Thyroid Nodule] : there were no palpable thyroid nodules [Respiration, Rhythm And Depth] : normal respiratory rhythm and effort [Auscultation Breath Sounds / Voice Sounds] : lungs were clear to auscultation bilaterally [Heart Rate And Rhythm] : heart rate was normal and rhythm regular [Heart Sounds] : normal S1 and S2 [Heart Sounds Gallop] : no gallops [Murmurs] : no murmurs [Heart Sounds Pericardial Friction Rub] : no pericardial rub [Full Pulse] : the pedal pulses are present [Edema] : there was no peripheral edema [Veins - Varicosity Changes] : there were no varicosital changes [Bowel Sounds] : normal bowel sounds [Abdomen Soft] : soft [Abdomen Tenderness] : non-tender [Abdomen Mass (___ Cm)] : no abdominal mass palpated [Cervical Lymph Nodes Enlarged Posterior Bilaterally] : posterior cervical [Cervical Lymph Nodes Enlarged Anterior Bilaterally] : anterior cervical [Supraclavicular Lymph Nodes Enlarged Bilaterally] : supraclavicular [No CVA Tenderness] : no ~M costovertebral angle tenderness [No Spinal Tenderness] : no spinal tenderness [Skin Color & Pigmentation] : normal skin color and pigmentation [Skin Turgor] : normal skin turgor [] : no rash [Skin Lesions] : no skin lesions [No Focal Deficits] : no focal deficits [Oriented To Time, Place, And Person] : oriented to person, place, and time [Impaired Insight] : insight and judgment were intact [Affect] : the affect was normal [Mood] : the mood was normal [Abnormal Walk] : normal gait [Nail Clubbing] : no clubbing  or cyanosis of the fingernails [Musculoskeletal - Swelling] : no joint swelling seen [Motor Tone] : muscle strength and tone were normal [FreeTextEntry1] : B/L patellofemoral crepitus in knees, no effusions, L knee w/ minimal tenderness and no effusion.  ROM all joints intact, no synovitis or tenderness. synovial proliferation hypertrophy of R #2 MCP

## 2024-05-02 NOTE — ASSESSMENT
[FreeTextEntry1] : 1. Gout - h/o R 1st MTP, reaction to allopurinol in past with facial rash/hypertensive. No current gouty arthritis activity since first occurrence. No treatment needed at this time for urate lowering therapy. If Uric acid levels > 12 or in setting of CKD or with repeat occurrences >2x/year will consider. Uric acid last was 11.0, usually above this level I would recommend ULT, will recheck labs today for evaluation. However, patient had reaction to allopurinol, so we cannot re-attempt use. Furthermore, Uloric (febuxostat) now will be getting blackbox warnings of increased MI, so it is also not an option. If her Cr is stable, I will want to start Lesinurad to lower her uric acid. She may also benefit from a weekly 0.6mg of colchicine for gout attack ppx while on a ULT, which should be continued for at least 3 months, with the attack risk lowering by 6 months after start of ULT Will plan to start Uloric- reviewed CAD/CVA risks, LighTouch low, recommended low dose. Was started on probenecid but stopped 2/2 renal function Now renal function improved, restarted on Probenecid 5 months ago w/ good response in joint pain and stiffness, no gout flares. Last UA 6.9, will recheck and increase dose if UA >6.0.  Acute gout flare on L ankle 5/1/2024 lasting a few days- rx steroid course, plan to change ULT from probencid to Uloric due to Cr changes. Will begin in 6 weeks after flare resides and stable due to increased risk for rpt flare. 2. Sarcoidosis - mostly pulmonary involvement with SOB that has improved, received no treatment. CT chest with mediastinal and hilar LAD, confirmed with LN bx. Episode of nephrolithiasis 2/2 calcium stone in past, unknown if hypercalcemic at this time. No evidence of sarcoid arthropathy. Stable disease on recent CT imaging, followed by pulmonary with normal PFTs and no changes. Now evidence hypercalcemia, but no other stigmata of sarcoid w/ stable pulmonary disease - no E nodosum, rash, inflammatory arthritis (other than L knee crystalline arthritis), or uveitis. 3. Psoriasis - generalized psoriatic plaques now localized to b/l shins and knees with little erythema, uses topicals if needed and moisturizes/emollients. No evidence of psoriatic arthropathy. 4. B/L knee OA - XR with mod tricomp narrowing on XR. Stable disease without symptoms currently. 5. CKD - was taking ibuprofen frequently, now stopped due to CKD. She should remain off NSAIDs. Last renal function: GFR~60s with normal Cr. Continued lab monitoring with hemeonc as well.  6. Hypercalcemia- was elevated to 14- had to go to [facility]. Stable currently and almost normalized. Continues to follow w/ renal. Last labs Ca 10.3, decreased from last.  7. L knee pain - s/p injection w/ ortho, likely OA related, however history of gout but preceding sxs appear more chronically progressive with slight subacute onset.  8. acute L ankle/tendon gout flare- Achilles, heel, ankle involved w/ tenosynovitis dorsal foot a/w redness, warmth, swelling, elevated ESR/CRP and UA. ACE and other sarcoid levels stable.  - Previous labs reviewed - UA not at goal, restarting new ULT w/ Uloric in 6 weeks, holding due to acute flare to start tx- may consider low-dose colchicine ppx when starting Uloric 40mg - steroid taper as ordered - c/w all home medications as prescribed  RTO 6 weeks for starting Uloric and ?colchicine ppx.

## 2024-05-02 NOTE — HISTORY OF PRESENT ILLNESS
[___ Month(s) Ago] : [unfilled] month(s) ago [FreeTextEntry1] : - labs reviewed- stable Cr/GFR and other labs - has been of probenecid, has been helpful but ?renal Cr alterations, so remains off - discussed other tx options w/ allergy to allopurinol and prior probenecid use, CKD off ARB, recommend Uloric due to paucity of other treatment options. Not severe enough for Krystexxa IV tx - reviewed Uloric effects and renal dosing, beginning at febuxostat 40mg daily in 6 weeks at next visit - acute flare x 7 days of L Achilles tendon, ankle a/w redness, warmth, swelling. Went to pods and said it wasn't fx, but to me is obviously gout - recommended steroid course- pt agrees - reviewed R/B/A of Uloric to begin in a few weeks, pt agrees - reports intermittent SOB, due for CT chest in next month- will f/u with pulm and me. Denies other respiratory changes, SOB, increased LE edema, nephrolithiasis, uveitis, rash, E nodosum, etc. - ROS remains unchanged otherwise.

## 2024-05-09 ENCOUNTER — APPOINTMENT (OUTPATIENT)
Dept: PULMONOLOGY | Facility: CLINIC | Age: 64
End: 2024-05-09
Payer: MEDICAID

## 2024-05-09 VITALS — WEIGHT: 170 LBS | BODY MASS INDEX: 31.69 KG/M2 | HEIGHT: 61.5 IN

## 2024-05-09 VITALS
HEART RATE: 86 BPM | RESPIRATION RATE: 17 BRPM | SYSTOLIC BLOOD PRESSURE: 128 MMHG | OXYGEN SATURATION: 98 % | DIASTOLIC BLOOD PRESSURE: 64 MMHG

## 2024-05-09 DIAGNOSIS — G47.33 OBSTRUCTIVE SLEEP APNEA (ADULT) (PEDIATRIC): ICD-10-CM

## 2024-05-09 DIAGNOSIS — R09.82 POSTNASAL DRIP: ICD-10-CM

## 2024-05-09 DIAGNOSIS — R91.8 OTHER NONSPECIFIC ABNORMAL FINDING OF LUNG FIELD: ICD-10-CM

## 2024-05-09 DIAGNOSIS — E66.9 OBESITY, UNSPECIFIED: ICD-10-CM

## 2024-05-09 DIAGNOSIS — R06.02 SHORTNESS OF BREATH: ICD-10-CM

## 2024-05-09 DIAGNOSIS — Z86.16 PERSONAL HISTORY OF COVID-19: ICD-10-CM

## 2024-05-09 DIAGNOSIS — R59.1 GENERALIZED ENLARGED LYMPH NODES: ICD-10-CM

## 2024-05-09 PROCEDURE — 94729 DIFFUSING CAPACITY: CPT

## 2024-05-09 PROCEDURE — 99214 OFFICE O/P EST MOD 30 MIN: CPT | Mod: 25

## 2024-05-09 PROCEDURE — 94727 GAS DIL/WSHOT DETER LNG VOL: CPT

## 2024-05-09 PROCEDURE — 94010 BREATHING CAPACITY TEST: CPT

## 2024-05-09 PROCEDURE — 85018 HEMOGLOBIN: CPT | Mod: QW

## 2024-05-09 NOTE — HISTORY OF PRESENT ILLNESS
[Excess Weight] : excess weight [Dyspnea] : dyspnea [Joint Pain] : joint pain [Low Calorie Diet] : low calorie diet [Fair Compliance] : fair compliance with treatment [Fair Tolerance] : fair tolerance of treatment [Diabetes] : diabetes [Hypertension] : hypertension [High] : high [Low Calorie] : low calorie [Well Balanced Diet] : well balanced meals [Infrequently] : exercises infrequently [Walking] : walking [Follow-Up - Routine Clinic] : a routine clinic follow-up of [Excessive Daytime Sleepiness] : excessive daytime sleepiness [Snoring] : snoring [Unrefreshing Sleep] : unrefreshing sleep [Sleepy When Sedentary] : sleepy when sedentary [Currently Experiencing] : The patient is currently experiencing symptoms. [None] : No associated symptoms are reported [CPAP] : CPAP [Poor Compliance] : poor compliance with treatment [Poor Tolerance] : poor tolerance of treatment [Poor Symptom Control] : poor symptom control [On ___] : performed on [unfilled] [Patient] : the patient [To Assess ___] : to assess [unfilled] [TextBox_4] : The patient recently suffered an ankle injury and was on crutches and a wheelchair but now is walking without support.\par  She is followed by hematology for anemia.\par  She has been on prednisone per nephrology for hypercalcemia.\par  Pt dx'd with Covid 19 on 1/8/22 when she developed h/a, congested cough, nasal congestion. She did not require therapy and improved. \par  Pt s/p Covid infection again on 12/15/22 with mild throat soreness but without other complaints. Pt did not require therapy.\par  Dx'd with DVT 6/24/23 on Eliquis but CTA x 2 was neg for PE; to see heme\par   [Witnessed Apnea During Sleep] : no witnessed apnea during sleep [Witnessed Gasping During Sleep] : no witnessed gasping during sleep [de-identified] : at 6 cm of water [FreeTextEntry9] : Chest CT [FreeTextEntry8] : increasing mediastinal and hilar HI [TextEntry] : Chest CT from 10/13/17 revealed improved HI but with new small nodules ? related to h/o sarcoid.  Chest CT from 5/2/18 revealed stable nodules and HI  Chest CT from 7/10/19 revealed some progression of disease. Chest CT from 11/22/19 revealed improvement in HI and nodule  Chest CT from 12/5/20 revealed multiple b/l nodules, mostly on R which may have increased in size and may be more conspicuous with persistent HI  Chest CT from 6/15/21 revealed stable changes Chest CT from 12/26/22 revealed stable changes Chest CTA from 6/24/23 and 7/1/23 revealed stable changes and no PE

## 2024-05-09 NOTE — RESULTS/DATA
[TextEntry] : Chest CT imaging as above.  Lymph node biopsy from 6/12/15 revealed non-caseasting granulomas c/w sarcoidosis PSG from 11/16/16 revealed moderate to severe WALTER with an AHI of 29.9 CPAP titration study from 1/22/17 revealed an optimal pressure of 6 cm of water.

## 2024-05-09 NOTE — END OF VISIT
[Time Spent: ___ minutes] : I have spent [unfilled] minutes of time on the encounter. [TextEntry] : Discussed with pt at length regarding WALTER, obesity, SOBOE, sarcoidosis, abnormal CT, nodules; reviewed prior w/u with pt as above.

## 2024-05-09 NOTE — DISCUSSION/SUMMARY
[FreeTextEntry1] : #1. CPAP at 6 cm of water to treat moderate to severe WALTER; encouraged compliance in the past though pt is not compliant; Consider dental evaluation for an oral appliance as alternative therapy for treatment of WALTER though no one takes her insurance so have referred pt to the Dr. Access line in the past; The patient understands the risks of untreated WALTER including heart disease, strokes, hypertension, pulmonary hypertension, and motor vehicle accidents as well as the need for treatment and weight loss; previously ordered Dreamwear nasal mask to improve compliance and now gave pt Brevida nasal mask to try. #2. Lung function testing have been normal; will repeat annually #3. Diet and exercise for weight loss #4. Repeat chest CT to f/u sarcoidosis as above; prednisone for hypercalcemia likely from sarcoidosis as needed per renal; repeat CT revealed stable changes in 7/2023; consider repeat CT in 1-2 years given continued stability. #5. F/u in 6 months with compliance. #6. Gyn f/u for pelvic HI. #7. SOBOE is likely related to weight or deconditioning given normal PFTs. #8. The patient does not appear to require BD therapy at this time. #9. Follow ACE and Ca levels per rheumatology; prednisone as needed for hypercalcemia thought to be due to her sarcoidosis per nephrology. #10. S/p obesity medicine eval. #11. Rheumatology and renal f/u. #12. Consider repeat PSG/HST with weight loss given non-compliance with therapy for mod-sev WALTER but pt unwilling for now. #13. Heme f/u for anemia and prior DVT; on Eliquis.  The patient expressed understanding and agreement with the above recommendations/plan and accepts responsibility to be compliant with recommended testing, therapies, and f/u visits. All relevant questions and concerns were addressed.

## 2024-05-09 NOTE — CONSULT LETTER
[Dear  ___] : Dear  [unfilled], [Consult Letter:] : I had the pleasure of evaluating your patient, [unfilled]. [Please see my note below.] : Please see my note below. [Consult Closing:] : Thank you very much for allowing me to participate in the care of this patient.  If you have any questions, please do not hesitate to contact me. [Sincerely,] : Sincerely, [DrYasmani  ___] : Dr. GASPAR [DrYasmani ___] : Dr. GASPAR [___] : [unfilled] [FreeTextEntry3] : Leland Sims MD, FCCP, D. ABSM\par  Pulmonary and Sleep Medicine\par  [facility] at [place]\par  \par

## 2024-05-09 NOTE — PROCEDURE
[FreeTextEntry1] : PFTs performed previously were normal. Spirometry subsequently was normal. PFTs 9/18/18 - normal PFTs 9/17/19 - normal and without significant change PFTs 8/20/20 - normal and without significant change PFTs 3/23/22 - normal PFTs 10/7/22 - normal PFTs 7/10/23 - normal. PFTs 5/9/24 - normal though lung volumes could not be obtained.

## 2024-06-01 ENCOUNTER — NON-APPOINTMENT (OUTPATIENT)
Age: 64
End: 2024-06-01

## 2024-06-06 ENCOUNTER — APPOINTMENT (OUTPATIENT)
Dept: ENDOCRINOLOGY | Facility: CLINIC | Age: 64
End: 2024-06-06
Payer: MEDICAID

## 2024-06-06 VITALS
SYSTOLIC BLOOD PRESSURE: 110 MMHG | HEIGHT: 61.5 IN | BODY MASS INDEX: 32.99 KG/M2 | DIASTOLIC BLOOD PRESSURE: 70 MMHG | HEART RATE: 96 BPM | OXYGEN SATURATION: 98 % | WEIGHT: 177 LBS

## 2024-06-06 DIAGNOSIS — E78.5 HYPERLIPIDEMIA, UNSPECIFIED: ICD-10-CM

## 2024-06-06 DIAGNOSIS — E04.2 NONTOXIC MULTINODULAR GOITER: ICD-10-CM

## 2024-06-06 DIAGNOSIS — E83.52 HYPERCALCEMIA: ICD-10-CM

## 2024-06-06 DIAGNOSIS — Z98.890 OTHER SPECIFIED POSTPROCEDURAL STATES: ICD-10-CM

## 2024-06-06 DIAGNOSIS — I10 ESSENTIAL (PRIMARY) HYPERTENSION: ICD-10-CM

## 2024-06-06 PROCEDURE — G2211 COMPLEX E/M VISIT ADD ON: CPT | Mod: NC

## 2024-06-06 PROCEDURE — 99215 OFFICE O/P EST HI 40 MIN: CPT

## 2024-06-06 NOTE — ASSESSMENT
[FreeTextEntry1] : 64 year old female with hypercalcemia secondary to sarcoidosis, also with T2DM, MNG, hypertension, and hyperlipidemia, gout.   1. Hypercalcemia- 1,25-vitamin D mediated secondary to sarcoidosis. -Continue Prednisone 2.5 mg daily. -Repeat calcium with PTH and 1,25.-vitamin D now.  2. T2DM-  -Continue Metformin 500 mg daily. -Repeat A1c now -Reviewed importance of lifestyle modifications- diet, exercise, and weight loss- to maintain glycemic control. -Recommend 30 minutes 3-4x per week of moderate exercise   3. Hypertension- controlled. Reports history of L adrenalectomy for HTN. Continue current regimen. -Check CT abdomen as ordered at last visit.   4. Hyperlipidemia -Continue statin. -Repeat lipids now.  5. MNG- B/L scattered subcentimeter cysts on sonogram May 2023. -Repeat thyroid sonogram - declines [id number] sx   6. Health maintenance- chronic steroids, history of hypercalcemia. DEXA scan: normal BMD

## 2024-06-06 NOTE — PHYSICAL EXAM
[Alert] : alert [Well Nourished] : well nourished [No Acute Distress] : no acute distress [Well Developed] : well developed [Normal Sclera/Conjunctiva] : normal sclera/conjunctiva [EOMI] : extra ocular movement intact [No Proptosis] : no proptosis [Normal Oropharynx] : the oropharynx was normal [Thyroid Not Enlarged] : the thyroid was not enlarged [No Thyroid Nodules] : no palpable thyroid nodules [No Respiratory Distress] : no respiratory distress [No Accessory Muscle Use] : no accessory muscle use [Clear to Auscultation] : lungs were clear to auscultation bilaterally [Normal S1, S2] : normal S1 and S2 [Normal Rate] : heart rate was normal [Regular Rhythm] : with a regular rhythm [No Edema] : no peripheral edema [Pedal Pulses Normal] : the pedal pulses are present [Normal Bowel Sounds] : normal bowel sounds [Not Tender] : non-tender [Not Distended] : not distended [Soft] : abdomen soft [Normal Gait] : normal gait [Normal Strength/Tone] : muscle strength and tone were normal [No Rash] : no rash [No Tremors] : no tremors [Oriented x3] : oriented to person, place, and time [Acanthosis Nigricans] : no acanthosis nigricans

## 2024-06-13 LAB
25(OH)D3 SERPL-MCNC: 20.8 NG/ML
ALBUMIN SERPL ELPH-MCNC: 4.1 G/DL
ALP BLD-CCNC: 54 U/L
ALT SERPL-CCNC: 15 U/L
ANION GAP SERPL CALC-SCNC: 11 MMOL/L
AST SERPL-CCNC: 16 U/L
BASOPHILS # BLD AUTO: 0.03 K/UL
BASOPHILS NFR BLD AUTO: 0.6 %
BILIRUB SERPL-MCNC: 0.3 MG/DL
BUN SERPL-MCNC: 25 MG/DL
CALCIUM SERPL-MCNC: 9.9 MG/DL
CHLORIDE SERPL-SCNC: 103 MMOL/L
CO2 SERPL-SCNC: 27 MMOL/L
CREAT SERPL-MCNC: 1.14 MG/DL
CRP SERPL-MCNC: 20 MG/L
EGFR: 53 ML/MIN/1.73M2
EOSINOPHIL # BLD AUTO: 0.11 K/UL
EOSINOPHIL NFR BLD AUTO: 2.3 %
ERYTHROCYTE [SEDIMENTATION RATE] IN BLOOD BY WESTERGREN METHOD: 24 MM/HR
GLUCOSE SERPL-MCNC: 91 MG/DL
HCT VFR BLD CALC: 32.4 %
HGB BLD-MCNC: 10 G/DL
IMM GRANULOCYTES NFR BLD AUTO: 0.6 %
LYMPHOCYTES # BLD AUTO: 1.01 K/UL
LYMPHOCYTES NFR BLD AUTO: 20.8 %
MAN DIFF?: NORMAL
MCHC RBC-ENTMCNC: 25 PG
MCHC RBC-ENTMCNC: 30.9 GM/DL
MCV RBC AUTO: 81 FL
MONOCYTES # BLD AUTO: 0.38 K/UL
MONOCYTES NFR BLD AUTO: 7.8 %
NEUTROPHILS # BLD AUTO: 3.3 K/UL
NEUTROPHILS NFR BLD AUTO: 67.9 %
PLATELET # BLD AUTO: 237 K/UL
POTASSIUM SERPL-SCNC: 5.1 MMOL/L
PROT SERPL-MCNC: 6.8 G/DL
RBC # BLD: 4 M/UL
RBC # FLD: 16.7 %
SODIUM SERPL-SCNC: 141 MMOL/L
URATE SERPL-MCNC: 10.2 MG/DL
WBC # FLD AUTO: 4.86 K/UL

## 2024-06-14 LAB
24R-OH-CALCIDIOL SERPL-MCNC: 27.6 PG/ML
ACE BLD-CCNC: 47 U/L

## 2024-06-17 LAB
ALBUMIN MFR SERPL ELPH: 53.7 %
ALBUMIN SERPL-MCNC: 3.7 G/DL
ALBUMIN/GLOB SERPL: 1.2 RATIO
ALPHA1 GLOB MFR SERPL ELPH: 5.2 %
ALPHA1 GLOB SERPL ELPH-MCNC: 0.4 G/DL
ALPHA2 GLOB MFR SERPL ELPH: 12.2 %
ALPHA2 GLOB SERPL ELPH-MCNC: 0.8 G/DL
B-GLOBULIN MFR SERPL ELPH: 15.1 %
B-GLOBULIN SERPL ELPH-MCNC: 1 G/DL
DEPRECATED KAPPA LC FREE/LAMBDA SER: 1.35 RATIO
GAMMA GLOB FLD ELPH-MCNC: 0.9 G/DL
GAMMA GLOB MFR SERPL ELPH: 13.8 %
IGA SER QL IEP: 286 MG/DL
IGG SER QL IEP: 988 MG/DL
IGM SER QL IEP: 25 MG/DL
INTERPRETATION SERPL IEP-IMP: NORMAL
KAPPA LC CSF-MCNC: 2.86 MG/DL
KAPPA LC SERPL-MCNC: 3.86 MG/DL
M PROTEIN SPEC IFE-MCNC: NORMAL
PROT SERPL-MCNC: 6.8 G/DL
PROT SERPL-MCNC: 6.8 G/DL

## 2024-06-18 ENCOUNTER — APPOINTMENT (OUTPATIENT)
Dept: NEPHROLOGY | Facility: CLINIC | Age: 64
End: 2024-06-18
Payer: MEDICARE

## 2024-06-18 VITALS
HEIGHT: 61.5 IN | WEIGHT: 179 LBS | BODY MASS INDEX: 33.36 KG/M2 | OXYGEN SATURATION: 97 % | HEART RATE: 105 BPM | DIASTOLIC BLOOD PRESSURE: 76 MMHG | SYSTOLIC BLOOD PRESSURE: 136 MMHG

## 2024-06-18 PROCEDURE — 99213 OFFICE O/P EST LOW 20 MIN: CPT

## 2024-06-18 NOTE — ASSESSMENT
[FreeTextEntry1] : 64-year-old female with hypertension, diabetes mellitus, hypercalcemia related to sarcoidosis on prednisone, gout seen today as a follow-up in outpatient nephrology clinic.  1. Chronic Kidney Disease Stage II/IIIa -ETIO: likely multifactorial 2/2 diabetic nephropathy, hypertensive nephrosclerosis and chronic NSAID use. -SCr: Has ranged between 1.1-1.4 in the past most recent labs with GFR at 53 -Proteinuria: no significant proteinuria, Rechecking UACR today -Renal US: Not available, CT in the past significant for small bilateral nonobstructive calculi -Discussed goal HTN < 140/90, LDL <100, A1c <7.  COMPLICATIONS OF CKD: -BMD w/ CKD: none -Anemia w/ CKD: Follows hematology, getting IV iron -Edema: Stable on Lasix 20 mg daily -Hyperuricemia: As above high uric acid, currently on Uloric.  Going to discuss new medication with rheumatology tomorrow  2. HTN: controlled on current medications. 3. Hypercalcemia related to sarcoidosis: Controlled, last labs with a serum calcium of 9.9 4. DM: controlled on current medications.   5.  Sarcoidosis: Management pulmonology and rheumatology, on prednisone, continue follow-up.

## 2024-06-18 NOTE — HISTORY OF PRESENT ILLNESS
[FreeTextEntry1] : 64-year-old female with hypertension, diabetes mellitus, hypercalcemia related to sarcoidosis on prednisone, gout seen today as a follow-up in outpatient nephrology clinic.  Denies any cardiac or urinary complaints. No dysuria, gross hematuria, SOB, LUTS. Reports blood sugars and BP has been well controlled.  There is no recent change in medications. Denies cramps, tetany, numbness, tingling, focal weakness, recent acute gout flare. Uric acid remains high, patient on Uloric.  Will be going tomorrow to rheumatology office to discuss" new medication" Reports, she is receiving IV iron for anemia of iron deficiency

## 2024-06-19 ENCOUNTER — APPOINTMENT (OUTPATIENT)
Dept: RHEUMATOLOGY | Facility: CLINIC | Age: 64
End: 2024-06-19
Payer: MEDICARE

## 2024-06-19 VITALS
HEART RATE: 105 BPM | SYSTOLIC BLOOD PRESSURE: 128 MMHG | TEMPERATURE: 98.1 F | OXYGEN SATURATION: 98 % | DIASTOLIC BLOOD PRESSURE: 80 MMHG | HEIGHT: 61.5 IN | BODY MASS INDEX: 33.36 KG/M2 | WEIGHT: 179 LBS

## 2024-06-19 DIAGNOSIS — Z79.899 OTHER LONG TERM (CURRENT) DRUG THERAPY: ICD-10-CM

## 2024-06-19 DIAGNOSIS — D86.9 SARCOIDOSIS, UNSPECIFIED: ICD-10-CM

## 2024-06-19 DIAGNOSIS — E79.0 HYPERURICEMIA W/OUT SIGNS OF INFLAMMATORY ARTHRITIS AND TOPHACEOUS DISEASE: ICD-10-CM

## 2024-06-19 DIAGNOSIS — N18.30 CHRONIC KIDNEY DISEASE, STAGE 3 UNSPECIFIED: ICD-10-CM

## 2024-06-19 DIAGNOSIS — M10.9 GOUT, UNSPECIFIED: ICD-10-CM

## 2024-06-19 LAB
APPEARANCE: CLEAR
BACTERIA: NEGATIVE /HPF
BILIRUBIN URINE: NEGATIVE
BLOOD URINE: NEGATIVE
CAST: 0 /LPF
COLOR: YELLOW
CREAT SPEC-SCNC: 60 MG/DL
EPITHELIAL CELLS: 1 /HPF
GLUCOSE QUALITATIVE U: NEGATIVE MG/DL
KETONES URINE: NEGATIVE MG/DL
LEUKOCYTE ESTERASE URINE: ABNORMAL
MICROALBUMIN 24H UR DL<=1MG/L-MCNC: <1.2 MG/DL
MICROALBUMIN/CREAT 24H UR-RTO: NORMAL MG/G
MICROSCOPIC-UA: NORMAL
NITRITE URINE: NEGATIVE
PH URINE: 6
PROTEIN URINE: NEGATIVE MG/DL
RED BLOOD CELLS URINE: 0 /HPF
SPECIFIC GRAVITY URINE: 1.02
UROBILINOGEN URINE: 0.2 MG/DL
WHITE BLOOD CELLS URINE: 5 /HPF

## 2024-06-19 PROCEDURE — 99204 OFFICE O/P NEW MOD 45 MIN: CPT

## 2024-06-19 PROCEDURE — G2211 COMPLEX E/M VISIT ADD ON: CPT

## 2024-06-19 RX ORDER — PREDNISONE 5 MG/1
5 TABLET ORAL
Qty: 32 | Refills: 0 | Status: DISCONTINUED | COMMUNITY
Start: 2024-05-01 | End: 2024-06-19

## 2024-06-19 RX ORDER — METFORMIN HYDROCHLORIDE 625 MG/1
TABLET ORAL
Refills: 0 | Status: ACTIVE | COMMUNITY

## 2024-06-20 RX ORDER — FEBUXOSTAT 40 MG/1
40 TABLET ORAL DAILY
Qty: 90 | Refills: 1 | Status: ACTIVE | COMMUNITY
Start: 2024-06-19

## 2024-06-25 PROBLEM — N18.30 CKD (CHRONIC KIDNEY DISEASE), STAGE III: Status: ACTIVE | Noted: 2018-05-16

## 2024-06-25 PROBLEM — Z79.899 HIGH RISK MEDICATION USE: Status: ACTIVE | Noted: 2023-09-21

## 2024-07-06 ENCOUNTER — APPOINTMENT (OUTPATIENT)
Dept: CT IMAGING | Facility: CLINIC | Age: 64
End: 2024-07-06

## 2024-07-24 ENCOUNTER — OFFICE (OUTPATIENT)
Dept: URBAN - METROPOLITAN AREA CLINIC 88 | Facility: CLINIC | Age: 64
Setting detail: OPHTHALMOLOGY
End: 2024-07-24
Payer: MEDICARE

## 2024-07-24 DIAGNOSIS — H43.12: ICD-10-CM

## 2024-07-24 DIAGNOSIS — H31.092: ICD-10-CM

## 2024-07-24 DIAGNOSIS — H43.813: ICD-10-CM

## 2024-07-24 DIAGNOSIS — E11.9: ICD-10-CM

## 2024-07-24 PROCEDURE — 99213 OFFICE O/P EST LOW 20 MIN: CPT | Performed by: OPHTHALMOLOGY

## 2024-07-24 PROCEDURE — 92134 CPTRZ OPH DX IMG PST SGM RTA: CPT | Performed by: OPHTHALMOLOGY

## 2024-07-24 ASSESSMENT — CONFRONTATIONAL VISUAL FIELD TEST (CVF)
OD_FINDINGS: FULL
OS_FINDINGS: FULL

## 2024-08-05 ENCOUNTER — EMERGENCY (EMERGENCY)
Facility: HOSPITAL | Age: 64
LOS: 1 days | Discharge: HOME | End: 2024-08-05
Attending: EMERGENCY MEDICINE
Payer: MEDICARE

## 2024-08-05 VITALS
RESPIRATION RATE: 18 BRPM | DIASTOLIC BLOOD PRESSURE: 88 MMHG | SYSTOLIC BLOOD PRESSURE: 145 MMHG | OXYGEN SATURATION: 96 % | TEMPERATURE: 98 F | HEART RATE: 99 BPM

## 2024-08-05 LAB
ALBUMIN SERPL ELPH-MCNC: 3.7 G/DL — SIGNIFICANT CHANGE UP (ref 3.3–5.2)
ALP SERPL-CCNC: 49 U/L — SIGNIFICANT CHANGE UP (ref 40–120)
ALT FLD-CCNC: 13 U/L — SIGNIFICANT CHANGE UP
ANION GAP SERPL CALC-SCNC: 15 MMOL/L — SIGNIFICANT CHANGE UP (ref 5–17)
APTT BLD: 42.5 SEC — HIGH (ref 24.5–35.6)
AST SERPL-CCNC: 19 U/L — SIGNIFICANT CHANGE UP
BASOPHILS # BLD AUTO: 0.03 K/UL — SIGNIFICANT CHANGE UP (ref 0–0.2)
BASOPHILS NFR BLD AUTO: 0.6 % — SIGNIFICANT CHANGE UP (ref 0–2)
BILIRUB SERPL-MCNC: 0.4 MG/DL — SIGNIFICANT CHANGE UP (ref 0.4–2)
BUN SERPL-MCNC: 26 MG/DL — HIGH (ref 8–20)
CALCIUM SERPL-MCNC: 10.5 MG/DL — SIGNIFICANT CHANGE UP (ref 8.4–10.5)
CHLORIDE SERPL-SCNC: 100 MMOL/L — SIGNIFICANT CHANGE UP (ref 96–108)
CO2 SERPL-SCNC: 22 MMOL/L — SIGNIFICANT CHANGE UP (ref 22–29)
CREAT SERPL-MCNC: 1.06 MG/DL — SIGNIFICANT CHANGE UP (ref 0.5–1.3)
EGFR: 58 ML/MIN/1.73M2 — LOW
EOSINOPHIL # BLD AUTO: 0.1 K/UL — SIGNIFICANT CHANGE UP (ref 0–0.5)
EOSINOPHIL NFR BLD AUTO: 2 % — SIGNIFICANT CHANGE UP (ref 0–6)
GAS PNL BLDV: SIGNIFICANT CHANGE UP
GLUCOSE BLDC GLUCOMTR-MCNC: 95 MG/DL — SIGNIFICANT CHANGE UP (ref 70–99)
GLUCOSE SERPL-MCNC: 95 MG/DL — SIGNIFICANT CHANGE UP (ref 70–99)
HCT VFR BLD CALC: 40.8 % — SIGNIFICANT CHANGE UP (ref 34.5–45)
HGB BLD-MCNC: 11.9 G/DL — SIGNIFICANT CHANGE UP (ref 11.5–15.5)
IMM GRANULOCYTES NFR BLD AUTO: 0.2 % — SIGNIFICANT CHANGE UP (ref 0–0.9)
INR BLD: 1.49 RATIO — HIGH (ref 0.85–1.18)
LYMPHOCYTES # BLD AUTO: 0.8 K/UL — LOW (ref 1–3.3)
LYMPHOCYTES # BLD AUTO: 16 % — SIGNIFICANT CHANGE UP (ref 13–44)
MCHC RBC-ENTMCNC: 24.9 PG — LOW (ref 27–34)
MCHC RBC-ENTMCNC: 29.2 GM/DL — LOW (ref 32–36)
MCV RBC AUTO: 85.4 FL — SIGNIFICANT CHANGE UP (ref 80–100)
MONOCYTES # BLD AUTO: 0.44 K/UL — SIGNIFICANT CHANGE UP (ref 0–0.9)
MONOCYTES NFR BLD AUTO: 8.8 % — SIGNIFICANT CHANGE UP (ref 2–14)
NEUTROPHILS # BLD AUTO: 3.63 K/UL — SIGNIFICANT CHANGE UP (ref 1.8–7.4)
NEUTROPHILS NFR BLD AUTO: 72.4 % — SIGNIFICANT CHANGE UP (ref 43–77)
PLATELET # BLD AUTO: 185 K/UL — SIGNIFICANT CHANGE UP (ref 150–400)
POTASSIUM SERPL-MCNC: 4.6 MMOL/L — SIGNIFICANT CHANGE UP (ref 3.5–5.3)
POTASSIUM SERPL-SCNC: 4.6 MMOL/L — SIGNIFICANT CHANGE UP (ref 3.5–5.3)
PROT SERPL-MCNC: 7.9 G/DL — SIGNIFICANT CHANGE UP (ref 6.6–8.7)
PROTHROM AB SERPL-ACNC: 16.3 SEC — HIGH (ref 9.5–13)
RBC # BLD: 4.78 M/UL — SIGNIFICANT CHANGE UP (ref 3.8–5.2)
RBC # FLD: 16.1 % — HIGH (ref 10.3–14.5)
SODIUM SERPL-SCNC: 137 MMOL/L — SIGNIFICANT CHANGE UP (ref 135–145)
WBC # BLD: 5.01 K/UL — SIGNIFICANT CHANGE UP (ref 3.8–10.5)
WBC # FLD AUTO: 5.01 K/UL — SIGNIFICANT CHANGE UP (ref 3.8–10.5)

## 2024-08-05 PROCEDURE — 73080 X-RAY EXAM OF ELBOW: CPT | Mod: 26,LT

## 2024-08-05 PROCEDURE — 99223 1ST HOSP IP/OBS HIGH 75: CPT | Mod: FS

## 2024-08-05 RX ORDER — PREDNISONE 10 MG/1
2.5 TABLET ORAL AT BEDTIME
Refills: 0 | Status: DISCONTINUED | OUTPATIENT
Start: 2024-08-05 | End: 2024-08-12

## 2024-08-05 RX ORDER — DEXTROSE MONOHYDRATE, SODIUM CHLORIDE, SODIUM LACTATE, CALCIUM CHLORIDE, MAGNESIUM CHLORIDE 1.5; 538; 448; 18.4; 5.08 G/100ML; MG/100ML; MG/100ML; MG/100ML; MG/100ML
1000 SOLUTION INTRAPERITONEAL
Refills: 0 | Status: DISCONTINUED | OUTPATIENT
Start: 2024-08-05 | End: 2024-08-12

## 2024-08-05 RX ORDER — DEXTROSE 4 G
25 TABLET,CHEWABLE ORAL ONCE
Refills: 0 | Status: DISCONTINUED | OUTPATIENT
Start: 2024-08-05 | End: 2024-08-12

## 2024-08-05 RX ORDER — OMEPRAZOLE 100 %
1 POWDER (GRAM) MISCELLANEOUS
Refills: 0 | DISCHARGE

## 2024-08-05 RX ORDER — ENALAPRIL MALEATE 10 MG/1
10 TABLET ORAL DAILY
Refills: 0 | Status: DISCONTINUED | OUTPATIENT
Start: 2024-08-05 | End: 2024-08-12

## 2024-08-05 RX ORDER — APIXABAN 5 MG/1
5 TABLET, FILM COATED ORAL EVERY 12 HOURS
Refills: 0 | Status: DISCONTINUED | OUTPATIENT
Start: 2024-08-05 | End: 2024-08-12

## 2024-08-05 RX ORDER — ACETAMINOPHEN 500 MG
650 TABLET ORAL EVERY 6 HOURS
Refills: 0 | Status: DISCONTINUED | OUTPATIENT
Start: 2024-08-05 | End: 2024-08-12

## 2024-08-05 RX ORDER — PANTOPRAZOLE SODIUM 20 MG/1
40 TABLET, DELAYED RELEASE ORAL
Refills: 0 | Status: DISCONTINUED | OUTPATIENT
Start: 2024-08-05 | End: 2024-08-12

## 2024-08-05 RX ORDER — GLUCAGON INJECTION, SOLUTION 0.5 MG/.1ML
1 INJECTION, SOLUTION SUBCUTANEOUS ONCE
Refills: 0 | Status: DISCONTINUED | OUTPATIENT
Start: 2024-08-05 | End: 2024-08-12

## 2024-08-05 RX ORDER — PREDNISONE 10 MG/1
1 TABLET ORAL
Refills: 0 | DISCHARGE

## 2024-08-05 RX ORDER — DOXYCYCLINE 100 MG/1
CAPSULE ORAL
Refills: 0 | Status: DISCONTINUED | OUTPATIENT
Start: 2024-08-05 | End: 2024-08-12

## 2024-08-05 RX ORDER — DOXYCYCLINE 100 MG/1
100 CAPSULE ORAL EVERY 12 HOURS
Refills: 0 | Status: DISCONTINUED | OUTPATIENT
Start: 2024-08-06 | End: 2024-08-12

## 2024-08-05 RX ORDER — DOXYCYCLINE 100 MG/1
100 CAPSULE ORAL ONCE
Refills: 0 | Status: COMPLETED | OUTPATIENT
Start: 2024-08-05 | End: 2024-08-05

## 2024-08-05 RX ORDER — ENALAPRIL MALEATE 10 MG/1
1 TABLET ORAL
Refills: 0 | DISCHARGE

## 2024-08-05 RX ORDER — METOPROLOL TARTRATE 100 MG
25 TABLET ORAL DAILY
Refills: 0 | Status: DISCONTINUED | OUTPATIENT
Start: 2024-08-05 | End: 2024-08-12

## 2024-08-05 RX ORDER — DEXTROSE 4 G
12.5 TABLET,CHEWABLE ORAL ONCE
Refills: 0 | Status: DISCONTINUED | OUTPATIENT
Start: 2024-08-05 | End: 2024-08-12

## 2024-08-05 RX ORDER — INSULIN LISPRO 100/ML
VIAL (ML) SUBCUTANEOUS
Refills: 0 | Status: DISCONTINUED | OUTPATIENT
Start: 2024-08-05 | End: 2024-08-12

## 2024-08-05 RX ORDER — METOPROLOL TARTRATE 100 MG
1 TABLET ORAL
Refills: 0 | DISCHARGE

## 2024-08-05 RX ORDER — DEXTROSE 4 G
15 TABLET,CHEWABLE ORAL ONCE
Refills: 0 | Status: DISCONTINUED | OUTPATIENT
Start: 2024-08-05 | End: 2024-08-12

## 2024-08-05 RX ADMIN — PREDNISONE 2.5 MILLIGRAM(S): 10 TABLET ORAL at 21:57

## 2024-08-05 RX ADMIN — DOXYCYCLINE 100 MILLIGRAM(S): 100 CAPSULE ORAL at 14:47

## 2024-08-05 RX ADMIN — Medication 40 MILLIGRAM(S): at 21:57

## 2024-08-05 NOTE — ED CDU PROVIDER INITIAL DAY NOTE - OBJECTIVE STATEMENT
64-year-old female past medical history of sarcoidosis on prednisone 2.5 mg daily, DVT on Eliquis 5 mg twice daily, hypertension, hyperlipidemia, anemia presents with left elbow redness and swelling.  Patient states on Thursday she bumped her elbow on a chair and on Sunday noticed redness and swelling to the left elbow.  Tmax of 100.3 at home.  Was seen at urgent care today and was sent to the emergency department.  Patient states symptoms actually improving.

## 2024-08-05 NOTE — ED CDU PROVIDER INITIAL DAY NOTE - CLINICAL SUMMARY MEDICAL DECISION MAKING FREE TEXT BOX
65 yo F with hx HTN HLD, sarcoidosis, DVT on eliquis presents with left elbow redness/swelling after hitting elbow on a chair 4 days ago. Labs unremarkable. XR negative. Seen by ortho. Area of erythema outlined. Will continue IV abx overnight, reassess. 63 yo F with hx HTN HLD, sarcoidosis, DVT on eliquis presents with left elbow redness/swelling after hitting elbow on a chair 4 days ago. Labs unremarkable. XR negative. Seen by ortho. Area of erythema outlined. Will continue IV abx overnight, reassess.  No leukocytosis.  X-ray unremarkable.  Patient seen by Ortho.  Patient placed in observation for left elbow cellulitis.  Will treat with IV antibiotics.

## 2024-08-05 NOTE — ED STATDOCS - CLINICAL SUMMARY MEDICAL DECISION MAKING FREE TEXT BOX
History and physical as noted.  Clinical picture not consistent with a septic joint given full range of motion of the elbow.  High clinical suspicion for cellulitis.  Will likely defer arthrocentesis at this time given overlying cellulitis and active anticoagulant use.  Will check x-ray given preceding trauma, labs (ESR and CRP unlikely to be useful given patient's comorbidities), will get blood culture and give empiric antibiotics.  Will yolis area, consider observation

## 2024-08-05 NOTE — CONSULT NOTE ADULT - SUBJECTIVE AND OBJECTIVE BOX
Pt Name: RADHA HALL    MRN: [id number]      Patient is a 64y Female presenting to the emergency department with a chief complaint of Left elbow pain. Patient reports hitting her elbow last Thursday on a chair.  Since then she noticed redness to the left forearm and elbow.  Patient mentions she had a fever saturday evening, took tylenol and it resolved.  Today she was seen be her primary care MD and suggested to come to [facility] for IVABX.   Patient reports improvement with antibiotics.       REVIEW OF SYSTEMS    General: Alert, responsive, in NAD    Skin/Breast: mild cellulitis left forearm  	  Ophthalmologic: No visual changes. No redness.   	  ENMT:	No discharge. No swelling.    Respiratory and Thorax: No difficulty breathing. No cough.  	   Cardiovascular:	No chest pain. No palpitations.    Gastrointestinal:	 No abdominal pain. No diarrhea.     Genitourinary: No dysuria. No bleeding.    Musculoskeletal: SEE HPI.    Neurological: No sensory or motor changes.     Psychiatric: No anxiety or depression.    Hematology/Lymphatics: No swelling.    Endocrine: No Hx of diabetes.    ROS is otherwise negative.    PAST MEDICAL & SURGICAL HISTORY:  PAST MEDICAL & SURGICAL HISTORY:  Psoriasis      HTN (hypertension)      DM (diabetes mellitus)      Sarcoidosis      HLD (hyperlipidemia)      Arthritis      DVT, lower extremity          Allergies: penicillins (Swelling)  Aspirin Adult Low Strength (Swelling)  allopurinol (Flushing; Fever)      Medications: doxycycline IVPB          FAMILY HISTORY:  : non-contributory    Social History:     Ambulation: Walking independently                            11.9   5.01  )-----------( 185      ( 05 Aug 2024 14:49 )             40.8       08-05    137  |  100  |  26.0<H>  ----------------------------<  95  4.6   |  22.0  |  1.06    Ca    10.5      05 Aug 2024 14:49    TPro  7.9  /  Alb  3.7  /  TBili  0.4  /  DBili  x   /  AST  19  /  ALT  13  /  AlkPhos  49  08-05      Vital Signs Last 24 Hrs  T(C): 36.8 (05 Aug 2024 11:56), Max: 36.8 (05 Aug 2024 11:56)  T(F): 98.3 (05 Aug 2024 11:56), Max: 98.3 (05 Aug 2024 11:56)  HR: 99 (05 Aug 2024 11:56) (99 - 99)  BP: 145/88 (05 Aug 2024 11:56) (145/88 - 145/88)  BP(mean): --  RR: 18 (05 Aug 2024 11:56) (18 - 18)  SpO2: 96% (05 Aug 2024 11:56) (96% - 96%)    Parameters below as of 05 Aug 2024 11:56  Patient On (Oxygen Delivery Method): room air        Daily     Daily       PHYSICAL EXAM:      Appearance: Alert, responsive, in no acute distress.    Neurological: Sensation is grossly intact to light touch. 5/5 motor function of all extremities. No focal deficits or weaknesses found.    Skin: Mild cellulitis left forearm and elbow. Marked by ED staff  Vascular: 2+ distal pulses. Cap refill < 2 sec. No signs of venous insufficiency or stasis. No extremity ulcerations. No cyanosis.    Musculoskeletal:         Left Upper Extremity:   Mild cellulitis noted to Left forearm and elbow.  Non tender to palpation. No fluctuance noted at olecranon. No breaks in skin noted. ROM; Full painless extension, flexion limited by 10 degrees.    IV site adjacent to cellulitis/ suggest changing IV locations to right upper extremity   FUll Painless rom of left wrist.   Motor and sensory remain intact 5/5      Imaging Studies:  < from: Xray Elbow AP + Lateral + Oblique, Left (08.05.24 @ 14:36) >  ACC: [id number] EXAM:  XR ELBOW COMP MIN 3V LT   ORDERED BY: JIN MITCHELL     PROCEDURE DATE:  08/05/2024          INTERPRETATION:  Radiographs of the LEFT elbow    CLINICAL INFORMATION: Cellulitis/status post trauma..    TECHNIQUE:  Frontal, oblique and lateral views of the elbow.    FINDINGS:   No prior examinations are available for review.  Elbow joint effusion with elevation of the anterior fat pad.  The osseous structures of the elbow are intact, without fracture or   dislocation.  Mild posterior olecranon subcutaneous soft tissue swelling.  No subcutaneous air or radiopaque foreign body.    IMPRESSION: Joint effusion.  No acute radiographic osseous pathology..  If pain persists despite conservative therapy and soft tissue internal   derangement or occult fracture is clinically suspected follow-up MRI   recommended.    --- End of Report ---    < end of copied text >    A/P:  Pt is a  64y Female with Left elbow / forearm cellulitis     Patient receiving Doxycycline  PLAN:   * Continue IV ABX  Change location of IV site  Monitor for improvement  Discussed with Dr Bruno

## 2024-08-05 NOTE — ED STATDOCS - OBJECTIVE STATEMENT
Is This A New Presentation, Or A Follow-Up?: Skin Lesions 64-year-old female past medical history of sarcoidosis on prednisone 2.5 mg daily, DVT on Eliquis 5 mg twice daily, hypertension, hyperlipidemia, anemia presents with left elbow redness and swelling.  Patient states on Thursday she bumped her elbow on a chair and on Sunday noticed redness and swelling to the left elbow.  Tmax of 100.3 at home.  Was seen at urgent care today and was sent to the emergency department.  Patient states symptoms actually improving. How Severe Is Your Skin Lesion?: mild Have Your Skin Lesions Been Treated?: not been treated

## 2024-08-05 NOTE — ED STATDOCS - PHYSICAL EXAMINATION
PHYSICAL EXAM:   General: well-appearing, appears stated age, not in extremis   HEENT: NC/AT, airway patent  Cardiovascular: regular rate and rhythm, + S1/S2, no murmurs, rubs, gallops appreciated  Respiratory: clear to auscultation bilaterally, good aeration bilaterally, nonlabored respirations  Abdominal: soft, nontender, nondistended, no rebound, guarding or rigidity  Extremities: L elbow with full ROM, minimal edema, +erythema tracking distally to forearm and proximally up posterior arm, no fluctuance palpated, compartments soft, no crepitus, +radial pulse  Neuro: Alert and oriented x3. Moving all extremities. no clear lateralizing deficits  Psychiatric: appropriate mood and affect.   Skin: as noted  -Cecilia Hyman MD Attending Physician PHYSICAL EXAM:   General: well-appearing, appears stated age, not in extremis   HEENT: NC/AT, airway patent  Cardiovascular: regular rate and rhythm, + S1/S2, no murmurs, rubs, gallops appreciated  Respiratory: clear to auscultation bilaterally, good aeration bilaterally, nonlabored respirations  Abdominal: soft, nontender, nondistended, no rebound, guarding or rigidity  Extremities: L elbow with full ROM, minimal edema, +erythema tracking distally to forearm, no fluctuance palpated, compartments soft, no crepitus, +radial pulse  Neuro: Alert and oriented x3. Moving all extremities. no clear lateralizing deficits  Psychiatric: appropriate mood and affect.   Skin: as noted  -Cecilia Hyman MD Attending Physician

## 2024-08-05 NOTE — ED STATDOCS - ATTENDING APP SHARED VISIT CONTRIBUTION OF CARE
I, Cecilia Hyman, performed the initial face to face bedside interview with this patient regarding history of present illness, review of symptoms and relevant past medical, social and/or family history.  I completed an independent physical examination and MDM and completed the note.  I was the initial provider who evaluated this patient. I have signed out the follow up and disposition of any pending tests (i.e. labs, radiological studies) to the ACP.  I have communicated the patient’s plan of care and disposition with the ACP.

## 2024-08-05 NOTE — ED STATDOCS - PRO INTERPRETER NEED 2
English
1) Follow-up with your Primary Medical Doctor. Call today / next business day for prompt follow-up.  2) Return to Emergency room for any worsening or persistent pain, weakness, fever, vomiting, unable to eat / drink,  or any other concerning symptoms.  3) See attached instruction sheets for additional information, including information regarding signs and symptoms to look out for, reasons to seek immediate care and other important instructions.  4) Plenty of fluids

## 2024-08-05 NOTE — ED CDU PROVIDER INITIAL DAY NOTE - ATTENDING APP SHARED VISIT CONTRIBUTION OF CARE
64-year-old female history of sarcoidosis, DVT, hypertension, dyslipidemia, anemia presents with left elbow redness and swelling consistent with cellulitis.  Tmax of 100.3 at home.

## 2024-08-05 NOTE — ED ADULT NURSE NOTE - CHIEF COMPLAINT QUOTE
PT presents to ED for left elbow pain and swelling since Thrusday. Sent by PCP to R/O cellulitis. Fever Saturday

## 2024-08-05 NOTE — ED ADULT NURSE NOTE - OBJECTIVE STATEMENT
Assumed care of pt at 1418 in . Pt A&Ox4 c/o left elbow pain, Pt left elbow appears light red in color, warm to touch. Pt endorses to having fever at home but not as of today. Pt denies CP and SOB RR even and unlabored,

## 2024-08-05 NOTE — ED STATDOCS - PROGRESS NOTE DETAILS
Labs reviewed. No leukocytosis. No lactate.   Xray left elbow: No acute radiographic osseous pathology. If pain persists despite conservative therapy and soft tissue internal derangement or occult fracture is clinically suspected follow-up MRI recommended.    Given doxy in the ED  Ortho consulted, recs appreciated.   Will place in ED Observation for few rounds of IV abx and dispo pending reassessment.

## 2024-08-05 NOTE — ED CDU PROVIDER INITIAL DAY NOTE - PHYSICAL EXAMINATION
PHYSICAL EXAM:   General: well-appearing, appears stated age, not in extremis   HEENT: NC/AT, airway patent  Cardiovascular: regular rate and rhythm, + S1/S2, no murmurs, rubs, gallops appreciated  Respiratory: clear to auscultation bilaterally, good aeration bilaterally, nonlabored respirations  Abdominal: soft, nontender, nondistended, no rebound, guarding or rigidity  Extremities: L elbow with full ROM, minimal edema, +erythema tracking distally to forearm, no fluctuance palpated, compartments soft, no crepitus, +radial pulse  Neuro: Alert and oriented x3. Moving all extremities. no clear lateralizing deficits  Psychiatric: appropriate mood and affect.   Skin: as noted  -Cecilia Hyman MD Attending Physician

## 2024-08-06 VITALS
HEART RATE: 86 BPM | RESPIRATION RATE: 16 BRPM | TEMPERATURE: 98 F | SYSTOLIC BLOOD PRESSURE: 114 MMHG | DIASTOLIC BLOOD PRESSURE: 73 MMHG | OXYGEN SATURATION: 99 %

## 2024-08-06 LAB
A1C WITH ESTIMATED AVERAGE GLUCOSE RESULT: 5.9 % — HIGH (ref 4–5.6)
ANION GAP SERPL CALC-SCNC: 12 MMOL/L — SIGNIFICANT CHANGE UP (ref 5–17)
BUN SERPL-MCNC: 22.4 MG/DL — HIGH (ref 8–20)
CALCIUM SERPL-MCNC: 10.5 MG/DL — SIGNIFICANT CHANGE UP (ref 8.4–10.5)
CHLORIDE SERPL-SCNC: 100 MMOL/L — SIGNIFICANT CHANGE UP (ref 96–108)
CO2 SERPL-SCNC: 28 MMOL/L — SIGNIFICANT CHANGE UP (ref 22–29)
CREAT SERPL-MCNC: 0.93 MG/DL — SIGNIFICANT CHANGE UP (ref 0.5–1.3)
EGFR: 68 ML/MIN/1.73M2 — SIGNIFICANT CHANGE UP
ESTIMATED AVERAGE GLUCOSE: 123 MG/DL — HIGH (ref 68–114)
GLUCOSE BLDC GLUCOMTR-MCNC: 98 MG/DL — SIGNIFICANT CHANGE UP (ref 70–99)
GLUCOSE SERPL-MCNC: 101 MG/DL — HIGH (ref 70–99)
HCT VFR BLD CALC: 37.8 % — SIGNIFICANT CHANGE UP (ref 34.5–45)
HGB BLD-MCNC: 12 G/DL — SIGNIFICANT CHANGE UP (ref 11.5–15.5)
MCHC RBC-ENTMCNC: 25.9 PG — LOW (ref 27–34)
MCHC RBC-ENTMCNC: 31.7 GM/DL — LOW (ref 32–36)
MCV RBC AUTO: 81.6 FL — SIGNIFICANT CHANGE UP (ref 80–100)
PLATELET # BLD AUTO: 220 K/UL — SIGNIFICANT CHANGE UP (ref 150–400)
POTASSIUM SERPL-MCNC: 5.2 MMOL/L — SIGNIFICANT CHANGE UP (ref 3.5–5.3)
POTASSIUM SERPL-SCNC: 5.2 MMOL/L — SIGNIFICANT CHANGE UP (ref 3.5–5.3)
RBC # BLD: 4.63 M/UL — SIGNIFICANT CHANGE UP (ref 3.8–5.2)
RBC # FLD: 15.9 % — HIGH (ref 10.3–14.5)
SODIUM SERPL-SCNC: 140 MMOL/L — SIGNIFICANT CHANGE UP (ref 135–145)
WBC # BLD: 4.78 K/UL — SIGNIFICANT CHANGE UP (ref 3.8–10.5)
WBC # FLD AUTO: 4.78 K/UL — SIGNIFICANT CHANGE UP (ref 3.8–10.5)

## 2024-08-06 PROCEDURE — 85018 HEMOGLOBIN: CPT

## 2024-08-06 PROCEDURE — 85025 COMPLETE CBC W/AUTO DIFF WBC: CPT

## 2024-08-06 PROCEDURE — 82947 ASSAY GLUCOSE BLOOD QUANT: CPT

## 2024-08-06 PROCEDURE — T1013: CPT

## 2024-08-06 PROCEDURE — 85014 HEMATOCRIT: CPT

## 2024-08-06 PROCEDURE — G0378: CPT

## 2024-08-06 PROCEDURE — 85027 COMPLETE CBC AUTOMATED: CPT

## 2024-08-06 PROCEDURE — 96365 THER/PROPH/DIAG IV INF INIT: CPT

## 2024-08-06 PROCEDURE — 80053 COMPREHEN METABOLIC PANEL: CPT

## 2024-08-06 PROCEDURE — 87040 BLOOD CULTURE FOR BACTERIA: CPT

## 2024-08-06 PROCEDURE — 84295 ASSAY OF SERUM SODIUM: CPT

## 2024-08-06 PROCEDURE — 73080 X-RAY EXAM OF ELBOW: CPT

## 2024-08-06 PROCEDURE — 84132 ASSAY OF SERUM POTASSIUM: CPT

## 2024-08-06 PROCEDURE — 96376 TX/PRO/DX INJ SAME DRUG ADON: CPT

## 2024-08-06 PROCEDURE — 99284 EMERGENCY DEPT VISIT MOD MDM: CPT | Mod: 25

## 2024-08-06 PROCEDURE — 85730 THROMBOPLASTIN TIME PARTIAL: CPT

## 2024-08-06 PROCEDURE — 83036 HEMOGLOBIN GLYCOSYLATED A1C: CPT

## 2024-08-06 PROCEDURE — 82435 ASSAY OF BLOOD CHLORIDE: CPT

## 2024-08-06 PROCEDURE — 82962 GLUCOSE BLOOD TEST: CPT

## 2024-08-06 PROCEDURE — 80048 BASIC METABOLIC PNL TOTAL CA: CPT

## 2024-08-06 PROCEDURE — 36415 COLL VENOUS BLD VENIPUNCTURE: CPT

## 2024-08-06 PROCEDURE — 83605 ASSAY OF LACTIC ACID: CPT

## 2024-08-06 PROCEDURE — 99239 HOSP IP/OBS DSCHRG MGMT >30: CPT

## 2024-08-06 PROCEDURE — 85610 PROTHROMBIN TIME: CPT

## 2024-08-06 PROCEDURE — 82330 ASSAY OF CALCIUM: CPT

## 2024-08-06 PROCEDURE — 82803 BLOOD GASES ANY COMBINATION: CPT

## 2024-08-06 RX ORDER — DOXYCYCLINE 100 MG/1
1 CAPSULE ORAL
Qty: 14 | Refills: 0
Start: 2024-08-06 | End: 2024-08-12

## 2024-08-06 RX ORDER — FAMOTIDINE 40 MG/1
1 TABLET, FILM COATED ORAL
Qty: 14 | Refills: 0
Start: 2024-08-06 | End: 2024-08-19

## 2024-08-06 RX ADMIN — DOXYCYCLINE 100 MILLIGRAM(S): 100 CAPSULE ORAL at 07:04

## 2024-08-06 RX ADMIN — ENALAPRIL MALEATE 10 MILLIGRAM(S): 10 TABLET ORAL at 06:08

## 2024-08-06 RX ADMIN — APIXABAN 5 MILLIGRAM(S): 5 TABLET, FILM COATED ORAL at 06:09

## 2024-08-06 RX ADMIN — DOXYCYCLINE 100 MILLIGRAM(S): 100 CAPSULE ORAL at 06:12

## 2024-08-06 RX ADMIN — Medication 25 MILLIGRAM(S): at 06:09

## 2024-08-06 NOTE — ED ADULT NURSE REASSESSMENT NOTE - NS ED NURSE REASSESS COMMENT FT1
Assumed care of pt at this time. Pt awake, respirations even and unlabored. Denies complaints
Assumed care of pt from previous RN. Pt A&Ox4, NAD. Breathing even and unlabored. Offering no complaints at this time.
Resting comfortably in bed. No acute distress noted. Patient has no complaints at this time. Patient to receive ABX. Scheduled for AM labs.
Resting comfortably in bed. No acute distress noted. Patient has no complaints at this time. Patient to receive ABX. Scheduled for AM labs.
Assumed care from RN . Patient is Alert and oriented x3. Resting comfortably in bed. No acute distress noted. Patient has no complaints at this time. Patient has outlined redness due to cellulitis in left elbow. Left IV access DC'd. 20G IV inserted in right forearm. Patient to receive ABX. Scheduled for AM labs.

## 2024-08-06 NOTE — ED CDU PROVIDER SUBSEQUENT DAY NOTE - CLINICAL SUMMARY MEDICAL DECISION MAKING FREE TEXT BOX
63 yo F with hx HTN HLD, sarcoidosis, DVT on eliquis presents with left elbow redness/swelling after hitting elbow on a chair 4 days ago. Labs unremarkable. XR negative. Seen by ortho. Area of erythema outlined. Will continue IV abx overnight, reassess.  No leukocytosis.  X-ray unremarkable.  Patient seen by Ortho.  Patient placed in observation for left elbow cellulitis.  Will treat with IV antibiotics.

## 2024-08-06 NOTE — ED CDU PROVIDER DISPOSITION NOTE - CLINICAL COURSE
63yo F PMHx HTN HLD, sarcoidosis, DVT on eliquis presented to ED with left elbow redness/swelling after hitting elbow on a chair 4 days ago. Labs unremarkable. XR negative. Evaluated by ortho, agreed with IV abx. Area of erythema borders marked. Patient placed in observation for left elbow cellulitis. Treated with IV doxycycline while in observation with significant improvement in erythema. Full ROM LUE and NV intact. no concern for septic arthritis. 63yo F PMHx HTN HLD, sarcoidosis, DVT on eliquis presented to ED with left elbow redness/swelling after hitting elbow on a chair 4 days ago. Labs unremarkable. XR negative. Evaluated by ortho, agreed with IV abx. Area of erythema borders marked. Patient placed in observation for left elbow cellulitis. Treated with IV doxycycline while in observation with significant improvement in erythema. Full ROM LUE and NV intact. no concern for septic arthritis. Cellulitis significantly improved on re-assessment. Cleared by orthopedics, recommending PO abx and f/u in 2 weeks. all results and plan of care d/w pt. provided copy of all results and rx for abx. return precautions discussed.

## 2024-08-06 NOTE — ED CDU PROVIDER DISPOSITION NOTE - CARE PROVIDER_API CALL
Fracisco Bruno  Orthopaedic Surgery  [address] [place] [id number]  Phone: [phone]  Fax: [phone]  Follow Up Time:

## 2024-08-06 NOTE — ED CDU PROVIDER DISPOSITION NOTE - NSFOLLOWUPINSTRUCTIONS_ED_ALL_ED_FT
- Return to the ED for any new or worsening symptoms.   - Please complete course of antibiotics as directed  - Follow-up with orthopedics for further evaluation if symptoms persist    Cellulitis    Cellulitis is a skin infection caused by bacteria. This condition occurs most often in the arms and lower legs but can occur anywhere over the body. Symptoms include redness, swelling, warm skin, tenderness, and chills/fever. If you were prescribed an antibiotic medicine, take it as told by your health care provider. Do not stop taking the antibiotic even if you start to feel better.    SEEK IMMEDIATE MEDICAL CARE IF YOU HAVE ANY OF THE FOLLOWING SYMPTOMS: worsening fever, red streaks coming from affected area, vomiting or diarrhea, or dizziness/lightheadedness.     It was a pleasure to participate in your medical care during today's emergency department visit. Please be aware that you will be receiving a survey in the mail in a few weeks regarding your experience today. We would greatly appreciate your cooperation in completing and returning this survey. Your insight and experience are very helpful to us as we continue to grow and work toward providing the best healthcare possible. Thank you. - Return to the ED for any new or worsening symptoms.   - Please complete course of antibiotics as directed  - Limit sun exposure or use sunscreen while on antibiotics  - Take pepcid while on antibiotics  - Follow-up with orthopedics in 2 weeks for further evaluation if symptoms persist    Cellulitis    Cellulitis is a skin infection caused by bacteria. This condition occurs most often in the arms and lower legs but can occur anywhere over the body. Symptoms include redness, swelling, warm skin, tenderness, and chills/fever. If you were prescribed an antibiotic medicine, take it as told by your health care provider. Do not stop taking the antibiotic even if you start to feel better.    SEEK IMMEDIATE MEDICAL CARE IF YOU HAVE ANY OF THE FOLLOWING SYMPTOMS: worsening fever, red streaks coming from affected area, vomiting or diarrhea, or dizziness/lightheadedness.     It was a pleasure to participate in your medical care during today's emergency department visit. Please be aware that you will be receiving a survey in the mail in a few weeks regarding your experience today. We would greatly appreciate your cooperation in completing and returning this survey. Your insight and experience are very helpful to us as we continue to grow and work toward providing the best healthcare possible. Thank you.    - Regrese al servicio de urgencias ante cualquier síntoma nuevo o que empeore.   - Complete el ciclo de antibióticos según las indicaciones.  - Limite la exposición al sol o use protector solar mientras esté tomando antibióticos.  - Crivitz pepcid mientras osmin antibióticos.  - Seguimiento con ortopedia en 2 semanas para evaluación adicional si los síntomas persisten    Celulitis    La celulitis es ana m infección de la piel causada por bacterias. Esta afección ocurre con mayor frecuencia en los brazos y la parte inferior de las piernas, noemi puede ocurrir en cualquier parte del cuerpo. Los síntomas incluyen enrojecimiento, hinchazón, piel caliente, sensibilidad y escalofríos/fiebre. Si le recetaron un antibiótico, tómelo según las indicaciones de belhcer proveedor de atención médica. No deje de rafal el antibiótico incluso si comienza a sentirse mejor.    BUSQUE ATENCIÓN MÉDICA INMEDIATA SI TIENE ALGUNO DE LOS SIGUIENTES SÍNTOMAS: fiebre que empeora, yovanny ramos que salen del área afectada, vómitos o diarrea, o mareos/aturdimiento.     Fue un placer participar en belcher atención médica kathy la visita de claudia al departamento de emergencias. Tenga en cuenta que recibirá ana m encuesta por correo en unas semanas sobre belcher experiencia actual. Apreciaríamos mucho belcher cooperación para completar y enviar esta encuesta. Belcher conocimiento y experiencia son muy útiles para nosotros a medida que continuamos creciendo y trabajando para brindar la mejor atención médica posible. Elayne.

## 2024-08-06 NOTE — PROGRESS NOTE ADULT - SUBJECTIVE AND OBJECTIVE BOX
Pt Name: RADHA HALL  MRN: [id number]      Patient is a 64y Female being followed for left elbow cellulitis. Patient seen and examined at bedside. Patient is doing well. Reports improved symptoms since yesterday. Pain is controlled with the prescribed medication. Denies CP, SOB, N/V, numbness/tingling. No other orthopedic complaints.        Vital Signs Last 24 Hrs  T(C): 36.6 (06 Aug 2024 08:29), Max: 36.8 (05 Aug 2024 11:56)  T(F): 97.8 (06 Aug 2024 08:29), Max: 98.3 (05 Aug 2024 11:56)  HR: 93 (06 Aug 2024 08:29) (71 - 99)  BP: 130/79 (06 Aug 2024 08:29) (104/68 - 145/88)  BP(mean): --  RR: 18 (06 Aug 2024 08:29) (16 - 18)  SpO2: 99% (06 Aug 2024 08:29) (96% - 99%)    Parameters below as of 06 Aug 2024 08:29  Patient On (Oxygen Delivery Method): room air      Daily     Daily                           11.9   5.01  )-----------( 185      ( 05 Aug 2024 14:49 )             40.8     08-05    137  |  100  |  26.0<H>  ----------------------------<  95  4.6   |  22.0  |  1.06    Ca    10.5      05 Aug 2024 14:49    TPro  7.9  /  Alb  3.7  /  TBili  0.4  /  DBili  x   /  AST  19  /  ALT  13  /  AlkPhos  49  08-05      PHYSICAL EXAM:    Appearance: Alert, responsive, in no acute distress.    Musculoskeletal:       Left Upper Extremity: Erythema resolving, visible marker demarcating extent of erythema. Skin is clean, dry and intact. Minimal tenderness lateral to olecranon. Some pain with pronation/supination. No abrasions, ecchymosis or erythema. No bleeding. Sensation is grossly intact to light touch. Radial pulses 2+. Cap refill < 2 seconds. No cyanosis.        A/P:  Pt is a  64y Female with left elbow cellulitiss, improving    PLAN:   * Continue IV ABX  * pain control  * elevation  * final plan pending discussion with Dr. Bruno Pt Name: RADHA HALL  MRN: [id number]      Patient is a 64y Female being followed for left elbow cellulitis. Patient seen and examined at bedside. Patient is doing well. Reports improved symptoms since yesterday. Pain is controlled with the prescribed medication. Denies CP, SOB, N/V, numbness/tingling. No other orthopedic complaints.        Vital Signs Last 24 Hrs  T(C): 36.6 (06 Aug 2024 08:29), Max: 36.8 (05 Aug 2024 11:56)  T(F): 97.8 (06 Aug 2024 08:29), Max: 98.3 (05 Aug 2024 11:56)  HR: 93 (06 Aug 2024 08:29) (71 - 99)  BP: 130/79 (06 Aug 2024 08:29) (104/68 - 145/88)  BP(mean): --  RR: 18 (06 Aug 2024 08:29) (16 - 18)  SpO2: 99% (06 Aug 2024 08:29) (96% - 99%)    Parameters below as of 06 Aug 2024 08:29  Patient On (Oxygen Delivery Method): room air      Daily     Daily                           11.9   5.01  )-----------( 185      ( 05 Aug 2024 14:49 )             40.8     08-05    137  |  100  |  26.0<H>  ----------------------------<  95  4.6   |  22.0  |  1.06    Ca    10.5      05 Aug 2024 14:49    TPro  7.9  /  Alb  3.7  /  TBili  0.4  /  DBili  x   /  AST  19  /  ALT  13  /  AlkPhos  49  08-05      PHYSICAL EXAM:    Appearance: Alert, responsive, in no acute distress.    Musculoskeletal:       Left Upper Extremity: Erythema resolving, visible marker demarcating extent of erythema. Skin is clean, dry and intact. Minimal tenderness lateral to olecranon. Some pain with pronation/supination. No abrasions, ecchymosis or erythema. No bleeding. Sensation is grossly intact to light touch. Radial pulses 2+. Cap refill < 2 seconds. No cyanosis.        A/P:  Pt is a  64y Female with left elbow cellulitiss, improving    PLAN:   * Continue IV ABX  * pain control  * elevation  * final plan pending discussion with Dr. Bruno        ADDENDUM 1220    * Plan discussed with Dr. Bruno  * patient can be discharged on PO ABX  * follow up outpatient in 2 weeks  * pain control  * elevation  * return precautions

## 2024-08-06 NOTE — ED CDU PROVIDER DISPOSITION NOTE - PATIENT PORTAL LINK FT
You can access the FollowMyHealth Patient Portal offered by [facility] by registering at the following website: [email or web address]. By joining [facility]’s FollowMyHealth portal, you will also be able to view your health information using other applications (apps) compatible with our system.

## 2024-08-07 ENCOUNTER — OUTPATIENT (OUTPATIENT)
Dept: OUTPATIENT SERVICES | Facility: HOSPITAL | Age: 64
LOS: 1 days | End: 2024-08-07

## 2024-08-07 DIAGNOSIS — Z00.8 ENCOUNTER FOR OTHER GENERAL EXAMINATION: ICD-10-CM

## 2024-08-08 ENCOUNTER — APPOINTMENT (OUTPATIENT)
Dept: ULTRASOUND IMAGING | Facility: CLINIC | Age: 64
End: 2024-08-08

## 2024-08-08 ENCOUNTER — OUTPATIENT (OUTPATIENT)
Dept: OUTPATIENT SERVICES | Facility: HOSPITAL | Age: 64
LOS: 1 days | End: 2024-08-08
Payer: MEDICARE

## 2024-08-08 PROCEDURE — 76775 US EXAM ABDO BACK WALL LIM: CPT

## 2024-08-08 PROCEDURE — 76775 US EXAM ABDO BACK WALL LIM: CPT | Mod: 26

## 2024-08-10 LAB
CULTURE RESULTS: SIGNIFICANT CHANGE UP
CULTURE RESULTS: SIGNIFICANT CHANGE UP
SPECIMEN SOURCE: SIGNIFICANT CHANGE UP
SPECIMEN SOURCE: SIGNIFICANT CHANGE UP

## 2024-08-14 ENCOUNTER — APPOINTMENT (OUTPATIENT)
Dept: RHEUMATOLOGY | Facility: CLINIC | Age: 64
End: 2024-08-14

## 2024-08-14 VITALS
SYSTOLIC BLOOD PRESSURE: 122 MMHG | OXYGEN SATURATION: 98 % | HEART RATE: 119 BPM | DIASTOLIC BLOOD PRESSURE: 76 MMHG | RESPIRATION RATE: 17 BRPM

## 2024-08-14 DIAGNOSIS — M10.9 GOUT, UNSPECIFIED: ICD-10-CM

## 2024-08-14 DIAGNOSIS — M65.9 SYNOVITIS AND TENOSYNOVITIS, UNSPECIFIED: ICD-10-CM

## 2024-08-14 DIAGNOSIS — L40.9 PSORIASIS, UNSPECIFIED: ICD-10-CM

## 2024-08-14 DIAGNOSIS — N18.30 CHRONIC KIDNEY DISEASE, STAGE 3 UNSPECIFIED: ICD-10-CM

## 2024-08-14 DIAGNOSIS — D86.9 SARCOIDOSIS, UNSPECIFIED: ICD-10-CM

## 2024-08-14 DIAGNOSIS — Z79.899 OTHER LONG TERM (CURRENT) DRUG THERAPY: ICD-10-CM

## 2024-08-14 PROCEDURE — G2211 COMPLEX E/M VISIT ADD ON: CPT

## 2024-08-14 PROCEDURE — 99215 OFFICE O/P EST HI 40 MIN: CPT

## 2024-08-14 RX ORDER — PREDNISONE 5 MG/1
5 TABLET ORAL
Qty: 30 | Refills: 0 | Status: ACTIVE | COMMUNITY
Start: 2024-08-14 | End: 1900-01-01

## 2024-09-14 ENCOUNTER — APPOINTMENT (OUTPATIENT)
Dept: ULTRASOUND IMAGING | Facility: CLINIC | Age: 64
End: 2024-09-14

## 2024-09-14 ENCOUNTER — OUTPATIENT (OUTPATIENT)
Dept: OUTPATIENT SERVICES | Facility: HOSPITAL | Age: 64
LOS: 1 days | End: 2024-09-14
Payer: MEDICARE

## 2024-09-14 DIAGNOSIS — E04.2 NONTOXIC MULTINODULAR GOITER: ICD-10-CM

## 2024-09-14 DIAGNOSIS — Z98.890 OTHER SPECIFIED POSTPROCEDURAL STATES: ICD-10-CM

## 2024-09-14 PROCEDURE — 76536 US EXAM OF HEAD AND NECK: CPT | Mod: 26

## 2024-09-14 PROCEDURE — 76536 US EXAM OF HEAD AND NECK: CPT

## 2024-09-26 ENCOUNTER — APPOINTMENT (OUTPATIENT)
Dept: ORTHOPEDIC SURGERY | Facility: CLINIC | Age: 64
End: 2024-09-26
Payer: MEDICARE

## 2024-09-26 VITALS
HEART RATE: 77 BPM | HEIGHT: 61.5 IN | WEIGHT: 179 LBS | SYSTOLIC BLOOD PRESSURE: 136 MMHG | BODY MASS INDEX: 33.36 KG/M2 | DIASTOLIC BLOOD PRESSURE: 77 MMHG

## 2024-09-26 DIAGNOSIS — M25.522 PAIN IN LEFT ELBOW: ICD-10-CM

## 2024-09-26 PROCEDURE — 99203 OFFICE O/P NEW LOW 30 MIN: CPT

## 2024-09-26 NOTE — DISCUSSION/SUMMARY
[de-identified] : Assessment: 64-year-old female with resolved left elbow septic bursitis  Plan: I had a long discussion with the patient today regarding the nature of their diagnosis and treatment plan. We discussed the risks and benefits of no treatment as well as nonoperative and operative treatments.  I reviewed the patient's x-rays which are negative for any acute injury.  On examination today the patient has good range of motion and strength without any pain or swelling.  She has no evidence of instability.  There is minimal to no swelling about the posterior aspect of the elbow of the olecranon bursa.  She denies any redness, fevers, chills, or any other systemic signs or symptoms of infection.  At this point she is cleared to return to her normal activities as tolerated without restriction.  She may follow-up on an as-needed basis.  The patient verbalizes their understanding and agrees with the plan.  All questions were answered to their satisfaction.

## 2024-09-26 NOTE — HISTORY OF PRESENT ILLNESS
[de-identified] : 9/26/2024: Nikko is a pleasant 64-year-old right-hand-dominant female who presents the office today for a new problem with her left elbow.  We previously treated for a nondisplaced radial head fracture last year which healed uneventfully.  However, about 1 month ago she started noticing pain and swelling about the back of her left elbow.  She was diagnosed with an infection and was treated at [facility] with antibiotics.  The infection is generally cleared and she feels much better but she was concerned about having a little bit of residual swelling and wanted to get it checked out again.  She denies any fevers, chills, sweats, or pain elsewhere.

## 2024-09-26 NOTE — PHYSICAL EXAM
[de-identified] : General: Awake, alert, no acute distress, Patient was cooperative and appropriate during the examination.  The patient's weight is normal for height and age.  Walks without an antalgic gait.   Left elbow Exam: Physical exam of the elbow demonstrates normal skin without signs of skin changes or abnormalities. No erythema, warmth, or joint effusion appreciated.  Minimal swelling is noted about the olecranon bursa.  Sensation intact light touch C5-T1 Palpable radial pulse Radial/ulnar/median/axillary/musculocutaneous/AIN/PIN nerves grossly intact  Range of motion: Flexion-Extension 0-145 degrees Pronation-Supination 85-85 degrees  Palpation: Not tender to palpation over the lateral epicondyle Not tender palpation over the medial epicondyle Not tender to palpation over the radial head Not tender to palpation over the olecranon Not tender to palpation over the distal biceps insertion Not tender to palpation over the distal triceps insertion Not tender to palpation over the cubital tunnel  Strength testing: Elbow Flexion 5/5 Elbow Extension 5/5 Pronation 5/5 Supination 5/5  Special Tests: No varus/valgus laxity at 0 and 30 degrees of elbow flexion No pain with resisted wrist/finger extension and forearm supination No pain with resisted wrist/finger flexion and forearm pronation Negative hook test Negative Tinel's over the carpal and cubital tunnels  [de-identified] : X-rays including 3 views of the left elbow from [facility] in August 2024 were reviewed.  No acute fracture or dislocation.  No arthritis.

## 2024-10-02 ENCOUNTER — APPOINTMENT (OUTPATIENT)
Dept: RHEUMATOLOGY | Facility: CLINIC | Age: 64
End: 2024-10-02

## 2024-10-02 VITALS
HEIGHT: 61.5 IN | OXYGEN SATURATION: 98 % | HEART RATE: 117 BPM | DIASTOLIC BLOOD PRESSURE: 90 MMHG | SYSTOLIC BLOOD PRESSURE: 184 MMHG | TEMPERATURE: 98.1 F

## 2024-10-02 VITALS — SYSTOLIC BLOOD PRESSURE: 132 MMHG | HEART RATE: 97 BPM | DIASTOLIC BLOOD PRESSURE: 82 MMHG

## 2024-10-02 DIAGNOSIS — M10.9 GOUT, UNSPECIFIED: ICD-10-CM

## 2024-10-02 DIAGNOSIS — M10.072 IDIOPATHIC GOUT, LEFT ANKLE AND FOOT: ICD-10-CM

## 2024-10-02 DIAGNOSIS — E79.0 HYPERURICEMIA W/OUT SIGNS OF INFLAMMATORY ARTHRITIS AND TOPHACEOUS DISEASE: ICD-10-CM

## 2024-10-02 PROCEDURE — 99214 OFFICE O/P EST MOD 30 MIN: CPT

## 2024-10-02 PROCEDURE — G2211 COMPLEX E/M VISIT ADD ON: CPT

## 2024-10-29 ENCOUNTER — APPOINTMENT (OUTPATIENT)
Dept: ORTHOPEDIC SURGERY | Facility: CLINIC | Age: 64
End: 2024-10-29

## 2024-10-29 ENCOUNTER — EMERGENCY (EMERGENCY)
Facility: HOSPITAL | Age: 64
LOS: 1 days | Discharge: HOME | End: 2024-10-29
Attending: EMERGENCY MEDICINE
Payer: MEDICARE

## 2024-10-29 VITALS
WEIGHT: 169.09 LBS | HEIGHT: 62 IN | RESPIRATION RATE: 20 BRPM | TEMPERATURE: 98 F | SYSTOLIC BLOOD PRESSURE: 133 MMHG | DIASTOLIC BLOOD PRESSURE: 74 MMHG | HEART RATE: 81 BPM | OXYGEN SATURATION: 99 %

## 2024-10-29 PROCEDURE — 93971 EXTREMITY STUDY: CPT | Mod: 26,LT

## 2024-10-29 PROCEDURE — 99284 EMERGENCY DEPT VISIT MOD MDM: CPT | Mod: FS

## 2024-10-29 PROCEDURE — 73630 X-RAY EXAM OF FOOT: CPT | Mod: 26,LT

## 2024-10-29 RX ORDER — OXYCODONE HYDROCHLORIDE 30 MG/1
5 TABLET ORAL ONCE
Refills: 0 | Status: DISCONTINUED | OUTPATIENT
Start: 2024-10-29 | End: 2024-10-29

## 2024-10-29 RX ORDER — ACETAMINOPHEN 500 MG
650 TABLET ORAL ONCE
Refills: 0 | Status: COMPLETED | OUTPATIENT
Start: 2024-10-29 | End: 2024-10-29

## 2024-10-29 RX ADMIN — Medication 650 MILLIGRAM(S): at 19:43

## 2024-10-29 NOTE — ED ADULT NURSE NOTE - OBJECTIVE STATEMENT
Pt c/o L foot pain & swelling, able to ambulate independently. Denies injury or trauma. RR even and unlabored on room air, no apparent distress noted at this time. Pt safety maintained.

## 2024-10-29 NOTE — ED PROVIDER NOTE - PATIENT PORTAL LINK FT
You can access the FollowMyHealth Patient Portal offered by [facility] by registering at the following website: [email or web address]. By joining [employer]’s FollowMyHealth portal, you will also be able to view your health information using other applications (apps) compatible with our system.

## 2024-10-29 NOTE — ED PROVIDER NOTE - PHYSICAL EXAMINATION
Gen: No acute distress, non toxic  HEENT: Mucous membranes moist, pink conjunctivae, EOMI  CV: RRR, nl s1/s2.  Resp: CTAB, normal rate and effort  GI: Abdomen soft, NT, ND. No rebound, no guarding  Neuro: A&O x 3, moving all 4 extremities  MSK: Left lateral foot TTP, with swelling. No calf tenderness.   Skin: No rashes. intact and perfused.

## 2024-10-29 NOTE — ED PROVIDER NOTE - OBJECTIVE STATEMENT
64 year old female past medical history of sarcoidosis on prednisone 2.5 mg daily, DVT on Eliquis 5 mg twice daily, hypertension, hyperlipidemia, anemia. presents to ED for left foot pain. Pt reports she had same atraumatic foot pain, 3 weeks ago was seen by rheumatologist, and podiatry. pt reports pain resolved after medrol does pack prescribed. Was told to get better sneakers and has not yet.  Pt states 3 days of pain left lateral foot. Denies trauma, leg swelling, HA, dizziness, SOB, CP, abd pain.

## 2024-10-29 NOTE — ED ADULT NURSE NOTE - NSFALLHARMRISKINTERV_ED_ALL_ED

## 2024-10-29 NOTE — ED PROVIDER NOTE - CLINICAL SUMMARY MEDICAL DECISION MAKING FREE TEXT BOX
64 year old female past medical history of sarcoidosis on prednisone 2.5 mg daily, DVT on Eliquis 5 mg twice daily, hypertension, hyperlipidemia, anemia. presents to ED for left foot pain. Pt reports she had same atraumatic foot pain, 3 weeks ago was seen by rheumatologist, and podiatry. pt reports pain resolved after medrol does pack prescribed. Was told to get better sneakers and has not yet.  Pt states 3 days of pain left lateral foot. Denies trauma, leg swelling, HA, dizziness, SOB, CP, abd pain.   Xray, US, re-assess 64 year old female past medical history of sarcoidosis on prednisone 2.5 mg daily, DVT on Eliquis 5 mg twice daily, hypertension, hyperlipidemia, anemia. presents to ED for left foot pain. Pt reports she had same atraumatic foot pain, 3 weeks ago was seen by rheumatologist, and podiatry. pt reports pain resolved after medrol does pack prescribed. Was told to get better sneakers and has not yet.  Pt states 3 days of pain left lateral foot. Denies trauma, leg swelling, HA, dizziness, SOB, CP, abd pain.   Xray, US, re-assess  Xray no acute fx, US no acute DVT  pt resting comfortably. will provide medrol dosepack and pt to follow up with rheum/podiatry    Pt reassessed, pt feeling better at this time, vss, pt able to walk, talk and vocalized plan of action. Discussed in depth and explained to pt in depth the next steps that need to be taken including proper follow up with PCP or specialists. All incidental findings were discussed with pt as well. Pt verbalized their concerns and all questions were answered. Pt understands dispo and wants discharge. Given good instructions when to return to ED, strict return precautions and importance of f/u.

## 2024-10-29 NOTE — ED PROVIDER NOTE - ATTENDING APP SHARED VISIT CONTRIBUTION OF CARE
64 year old female past medical history of sarcoidosis on prednisone 2.5 mg daily, DVT on Eliquis 5 mg twice daily, hypertension, hyperlipidemia, anemia. presents to ED for left foot pain. Pt reports she had same atraumatic foot pain, 3 weeks ago was seen by rheumatologist, and podiatry. pt reports pain resolved after medrol does pack prescribed. Was told to get better sneakers and has not yet.  Pt states 3 days of pain left lateral foot. Denies trauma, leg swelling, HA, dizziness, SOB, CP, abd pain.    Symptoms likely rheumatic in nature however given age and risk factors will obtain x-ray and  DVT sono, restart steroids, encourage outpatient follow-up with podiatry and rheum  if workup is unremarkable.

## 2024-10-30 RX ORDER — METHYLPREDNISOLONE ACETATE 80 MG/ML
1 INJECTION, SUSPENSION INTRALESIONAL; INTRAMUSCULAR; INTRASYNOVIAL; SOFT TISSUE
Qty: 1 | Refills: 0
Start: 2024-10-30

## 2024-11-07 ENCOUNTER — APPOINTMENT (OUTPATIENT)
Dept: PULMONOLOGY | Facility: CLINIC | Age: 64
End: 2024-11-07
Payer: MEDICAID

## 2024-11-07 VITALS
DIASTOLIC BLOOD PRESSURE: 70 MMHG | HEART RATE: 98 BPM | RESPIRATION RATE: 16 BRPM | SYSTOLIC BLOOD PRESSURE: 120 MMHG | OXYGEN SATURATION: 98 %

## 2024-11-07 DIAGNOSIS — R09.82 POSTNASAL DRIP: ICD-10-CM

## 2024-11-07 DIAGNOSIS — R06.02 SHORTNESS OF BREATH: ICD-10-CM

## 2024-11-07 DIAGNOSIS — Z86.16 PERSONAL HISTORY OF COVID-19: ICD-10-CM

## 2024-11-07 DIAGNOSIS — R91.8 OTHER NONSPECIFIC ABNORMAL FINDING OF LUNG FIELD: ICD-10-CM

## 2024-11-07 DIAGNOSIS — G47.33 OBSTRUCTIVE SLEEP APNEA (ADULT) (PEDIATRIC): ICD-10-CM

## 2024-11-07 DIAGNOSIS — D86.9 SARCOIDOSIS, UNSPECIFIED: ICD-10-CM

## 2024-11-07 DIAGNOSIS — R59.1 GENERALIZED ENLARGED LYMPH NODES: ICD-10-CM

## 2024-11-07 PROCEDURE — 99214 OFFICE O/P EST MOD 30 MIN: CPT

## 2024-11-07 PROCEDURE — G2211 COMPLEX E/M VISIT ADD ON: CPT | Mod: NC

## 2024-11-12 ENCOUNTER — NON-APPOINTMENT (OUTPATIENT)
Age: 64
End: 2024-11-12

## 2024-11-12 ENCOUNTER — APPOINTMENT (OUTPATIENT)
Dept: ENDOCRINOLOGY | Facility: CLINIC | Age: 64
End: 2024-11-12
Payer: MEDICARE

## 2024-11-12 VITALS
HEART RATE: 103 BPM | OXYGEN SATURATION: 98 % | HEIGHT: 61.5 IN | WEIGHT: 178 LBS | SYSTOLIC BLOOD PRESSURE: 108 MMHG | BODY MASS INDEX: 33.18 KG/M2 | DIASTOLIC BLOOD PRESSURE: 70 MMHG

## 2024-11-12 DIAGNOSIS — I10 ESSENTIAL (PRIMARY) HYPERTENSION: ICD-10-CM

## 2024-11-12 DIAGNOSIS — E83.52 HYPERCALCEMIA: ICD-10-CM

## 2024-11-12 DIAGNOSIS — E11.22 TYPE 2 DIABETES MELLITUS WITH DIABETIC CHRONIC KIDNEY DISEASE: ICD-10-CM

## 2024-11-12 DIAGNOSIS — N18.30 CHRONIC KIDNEY DISEASE, STAGE 3 UNSPECIFIED: ICD-10-CM

## 2024-11-12 DIAGNOSIS — E66.9 OBESITY, UNSPECIFIED: ICD-10-CM

## 2024-11-12 DIAGNOSIS — Z86.39 PERSONAL HISTORY OF OTHER ENDOCRINE, NUTRITIONAL AND METABOLIC DISEASE: ICD-10-CM

## 2024-11-12 DIAGNOSIS — E55.9 VITAMIN D DEFICIENCY, UNSPECIFIED: ICD-10-CM

## 2024-11-12 LAB — GLUCOSE BLDC GLUCOMTR-MCNC: 139

## 2024-11-12 PROCEDURE — 82962 GLUCOSE BLOOD TEST: CPT

## 2024-11-12 PROCEDURE — G2211 COMPLEX E/M VISIT ADD ON: CPT

## 2024-11-12 PROCEDURE — 99214 OFFICE O/P EST MOD 30 MIN: CPT

## 2024-11-12 RX ORDER — METHYLPREDNISOLONE 4 MG/1
4 TABLET ORAL
Qty: 21 | Refills: 0 | Status: ACTIVE | COMMUNITY
Start: 2024-10-30

## 2024-11-14 LAB
25(OH)D3 SERPL-MCNC: 16.5 NG/ML
ALBUMIN SERPL ELPH-MCNC: 3.8 G/DL
ALP BLD-CCNC: 50 U/L
ALT SERPL-CCNC: 9 U/L
ANION GAP SERPL CALC-SCNC: 12 MMOL/L
AST SERPL-CCNC: 12 U/L
BASOPHILS # BLD AUTO: 0.02 K/UL
BASOPHILS NFR BLD AUTO: 0.3 %
BILIRUB SERPL-MCNC: 0.4 MG/DL
BUN SERPL-MCNC: 21 MG/DL
CALCIUM SERPL-MCNC: 9.7 MG/DL
CALCIUM SERPL-MCNC: 9.7 MG/DL
CHLORIDE SERPL-SCNC: 102 MMOL/L
CHOLEST SERPL-MCNC: 177 MG/DL
CO2 SERPL-SCNC: 25 MMOL/L
CREAT SERPL-MCNC: 1.11 MG/DL
EGFR: 55 ML/MIN/1.73M2
EOSINOPHIL # BLD AUTO: 0.11 K/UL
EOSINOPHIL NFR BLD AUTO: 1.4 %
ESTIMATED AVERAGE GLUCOSE: 123 MG/DL
GLUCOSE SERPL-MCNC: 82 MG/DL
HBA1C MFR BLD HPLC: 5.9 %
HCT VFR BLD CALC: 34 %
HDLC SERPL-MCNC: 43 MG/DL
HGB BLD-MCNC: 10.5 G/DL
IMM GRANULOCYTES NFR BLD AUTO: 0.4 %
LDLC SERPL CALC-MCNC: 109 MG/DL
LYMPHOCYTES # BLD AUTO: 1.33 K/UL
LYMPHOCYTES NFR BLD AUTO: 17.3 %
MAN DIFF?: NORMAL
MCHC RBC-ENTMCNC: 26.6 PG
MCHC RBC-ENTMCNC: 30.9 G/DL
MCV RBC AUTO: 86.3 FL
MONOCYTES # BLD AUTO: 0.66 K/UL
MONOCYTES NFR BLD AUTO: 8.6 %
NEUTROPHILS # BLD AUTO: 5.53 K/UL
NEUTROPHILS NFR BLD AUTO: 72 %
NONHDLC SERPL-MCNC: 133 MG/DL
PARATHYROID HORMONE INTACT: 48 PG/ML
PLATELET # BLD AUTO: 232 K/UL
POTASSIUM SERPL-SCNC: 5.3 MMOL/L
PROT SERPL-MCNC: 6.5 G/DL
RBC # BLD: 3.94 M/UL
RBC # FLD: 15 %
SODIUM SERPL-SCNC: 139 MMOL/L
T3FREE SERPL-MCNC: 2.63 PG/ML
T4 FREE SERPL-MCNC: 1.3 NG/DL
TRIGL SERPL-MCNC: 138 MG/DL
TSH SERPL-ACNC: 1.08 UIU/ML
VIT B12 SERPL-MCNC: 547 PG/ML
WBC # FLD AUTO: 7.68 K/UL

## 2024-11-18 LAB — CA-I SERPL-SCNC: 5.4 MG/DL

## 2024-11-20 PROCEDURE — 93971 EXTREMITY STUDY: CPT

## 2024-11-20 PROCEDURE — 99284 EMERGENCY DEPT VISIT MOD MDM: CPT | Mod: 25

## 2024-11-20 PROCEDURE — 73630 X-RAY EXAM OF FOOT: CPT

## 2024-12-03 ENCOUNTER — NON-APPOINTMENT (OUTPATIENT)
Age: 64
End: 2024-12-03

## 2024-12-03 ENCOUNTER — APPOINTMENT (OUTPATIENT)
Dept: NEPHROLOGY | Facility: CLINIC | Age: 64
End: 2024-12-03
Payer: MEDICARE

## 2024-12-03 VITALS
OXYGEN SATURATION: 99 % | BODY MASS INDEX: 31.04 KG/M2 | SYSTOLIC BLOOD PRESSURE: 138 MMHG | HEART RATE: 100 BPM | DIASTOLIC BLOOD PRESSURE: 82 MMHG | WEIGHT: 167 LBS

## 2024-12-03 PROCEDURE — 99213 OFFICE O/P EST LOW 20 MIN: CPT

## 2024-12-05 LAB
APPEARANCE: CLEAR
BACTERIA: NEGATIVE /HPF
BILIRUBIN URINE: NEGATIVE
BLOOD URINE: NEGATIVE
CAST: 0 /LPF
COLOR: YELLOW
CREAT SPEC-SCNC: 68 MG/DL
EPITHELIAL CELLS: 1 /HPF
GLUCOSE QUALITATIVE U: NEGATIVE MG/DL
KETONES URINE: NEGATIVE MG/DL
LEUKOCYTE ESTERASE URINE: NEGATIVE
MICROALBUMIN 24H UR DL<=1MG/L-MCNC: <1.2 MG/DL
MICROALBUMIN/CREAT 24H UR-RTO: NORMAL MG/G
MICROSCOPIC-UA: NORMAL
NITRITE URINE: NEGATIVE
PH URINE: 5.5
PROTEIN URINE: NEGATIVE MG/DL
RED BLOOD CELLS URINE: 0 /HPF
SPECIFIC GRAVITY URINE: 1.01
UROBILINOGEN URINE: 0.2 MG/DL
WHITE BLOOD CELLS URINE: 1 /HPF

## 2024-12-09 ENCOUNTER — APPOINTMENT (OUTPATIENT)
Dept: UROLOGY | Facility: CLINIC | Age: 64
End: 2024-12-09
Payer: MEDICARE

## 2024-12-09 VITALS — DIASTOLIC BLOOD PRESSURE: 80 MMHG | HEART RATE: 65 BPM | SYSTOLIC BLOOD PRESSURE: 125 MMHG

## 2024-12-09 DIAGNOSIS — N20.0 CALCULUS OF KIDNEY: ICD-10-CM

## 2024-12-09 PROCEDURE — 99204 OFFICE O/P NEW MOD 45 MIN: CPT

## 2024-12-11 ENCOUNTER — APPOINTMENT (OUTPATIENT)
Dept: RHEUMATOLOGY | Facility: CLINIC | Age: 64
End: 2024-12-11
Payer: MEDICARE

## 2024-12-11 VITALS
BODY MASS INDEX: 31.13 KG/M2 | SYSTOLIC BLOOD PRESSURE: 110 MMHG | WEIGHT: 167 LBS | OXYGEN SATURATION: 94 % | DIASTOLIC BLOOD PRESSURE: 70 MMHG | TEMPERATURE: 98 F | HEIGHT: 61.5 IN | HEART RATE: 91 BPM

## 2024-12-11 DIAGNOSIS — E79.0 HYPERURICEMIA W/OUT SIGNS OF INFLAMMATORY ARTHRITIS AND TOPHACEOUS DISEASE: ICD-10-CM

## 2024-12-11 DIAGNOSIS — M10.9 GOUT, UNSPECIFIED: ICD-10-CM

## 2024-12-11 DIAGNOSIS — D86.9 SARCOIDOSIS, UNSPECIFIED: ICD-10-CM

## 2024-12-11 DIAGNOSIS — N18.30 CHRONIC KIDNEY DISEASE, STAGE 3 UNSPECIFIED: ICD-10-CM

## 2024-12-11 PROCEDURE — 99214 OFFICE O/P EST MOD 30 MIN: CPT

## 2024-12-11 RX ORDER — FEBUXOSTAT 40 MG/1
40 TABLET ORAL DAILY
Qty: 90 | Refills: 1 | Status: ACTIVE | COMMUNITY
Start: 2024-12-11 | End: 1900-01-01

## 2024-12-12 LAB — BACTERIA UR CULT: NORMAL

## 2024-12-19 ENCOUNTER — NON-APPOINTMENT (OUTPATIENT)
Age: 64
End: 2024-12-19

## 2024-12-19 LAB
KIDNEY STONE SOURCE: NORMAL
NIDUS STONE QN: NORMAL
RESULT COMMENT: NORMAL

## 2024-12-30 ENCOUNTER — EMERGENCY (EMERGENCY)
Facility: HOSPITAL | Age: 64
LOS: 1 days | Discharge: HOME | End: 2024-12-30
Attending: EMERGENCY MEDICINE
Payer: MEDICARE

## 2024-12-30 ENCOUNTER — APPOINTMENT (OUTPATIENT)
Dept: CT IMAGING | Facility: CLINIC | Age: 64
End: 2024-12-30

## 2024-12-30 VITALS
HEART RATE: 116 BPM | HEIGHT: 62 IN | DIASTOLIC BLOOD PRESSURE: 66 MMHG | RESPIRATION RATE: 18 BRPM | OXYGEN SATURATION: 97 % | WEIGHT: 162.26 LBS | TEMPERATURE: 98 F | SYSTOLIC BLOOD PRESSURE: 135 MMHG

## 2024-12-30 LAB
ALBUMIN SERPL ELPH-MCNC: 3.8 G/DL — SIGNIFICANT CHANGE UP (ref 3.3–5.2)
ALP SERPL-CCNC: 55 U/L — SIGNIFICANT CHANGE UP (ref 40–120)
ALT FLD-CCNC: 11 U/L — SIGNIFICANT CHANGE UP
ANION GAP SERPL CALC-SCNC: 19 MMOL/L — HIGH (ref 5–17)
APTT BLD: 46 SEC — HIGH (ref 24.5–35.6)
AST SERPL-CCNC: 15 U/L — SIGNIFICANT CHANGE UP
BASOPHILS # BLD AUTO: 0.02 K/UL — SIGNIFICANT CHANGE UP (ref 0–0.2)
BASOPHILS NFR BLD AUTO: 0.3 % — SIGNIFICANT CHANGE UP (ref 0–2)
BILIRUB SERPL-MCNC: 0.4 MG/DL — SIGNIFICANT CHANGE UP (ref 0.4–2)
BUN SERPL-MCNC: 22.9 MG/DL — HIGH (ref 8–20)
CALCIUM SERPL-MCNC: 10.6 MG/DL — HIGH (ref 8.4–10.5)
CHLORIDE SERPL-SCNC: 104 MMOL/L — SIGNIFICANT CHANGE UP (ref 96–108)
CO2 SERPL-SCNC: 22 MMOL/L — SIGNIFICANT CHANGE UP (ref 22–29)
CREAT SERPL-MCNC: 0.89 MG/DL — SIGNIFICANT CHANGE UP (ref 0.5–1.3)
EGFR: 72 ML/MIN/1.73M2 — SIGNIFICANT CHANGE UP
EOSINOPHIL # BLD AUTO: 0.07 K/UL — SIGNIFICANT CHANGE UP (ref 0–0.5)
EOSINOPHIL NFR BLD AUTO: 1 % — SIGNIFICANT CHANGE UP (ref 0–6)
GLUCOSE SERPL-MCNC: 114 MG/DL — HIGH (ref 70–99)
HCT VFR BLD CALC: 34.2 % — LOW (ref 34.5–45)
HGB BLD-MCNC: 10.6 G/DL — LOW (ref 11.5–15.5)
IMM GRANULOCYTES NFR BLD AUTO: 0.3 % — SIGNIFICANT CHANGE UP (ref 0–0.9)
INR BLD: 1.91 RATIO — HIGH (ref 0.85–1.16)
LYMPHOCYTES # BLD AUTO: 0.81 K/UL — LOW (ref 1–3.3)
LYMPHOCYTES # BLD AUTO: 11.8 % — LOW (ref 13–44)
MCHC RBC-ENTMCNC: 25.2 PG — LOW (ref 27–34)
MCHC RBC-ENTMCNC: 31 G/DL — LOW (ref 32–36)
MCV RBC AUTO: 81.2 FL — SIGNIFICANT CHANGE UP (ref 80–100)
MONOCYTES # BLD AUTO: 0.61 K/UL — SIGNIFICANT CHANGE UP (ref 0–0.9)
MONOCYTES NFR BLD AUTO: 8.9 % — SIGNIFICANT CHANGE UP (ref 2–14)
NEUTROPHILS # BLD AUTO: 5.35 K/UL — SIGNIFICANT CHANGE UP (ref 1.8–7.4)
NEUTROPHILS NFR BLD AUTO: 77.7 % — HIGH (ref 43–77)
PLATELET # BLD AUTO: 303 K/UL — SIGNIFICANT CHANGE UP (ref 150–400)
POTASSIUM SERPL-MCNC: 4.9 MMOL/L — SIGNIFICANT CHANGE UP (ref 3.5–5.3)
POTASSIUM SERPL-SCNC: 4.9 MMOL/L — SIGNIFICANT CHANGE UP (ref 3.5–5.3)
PROT SERPL-MCNC: 8.1 G/DL — SIGNIFICANT CHANGE UP (ref 6.6–8.7)
PROTHROM AB SERPL-ACNC: 21.5 SEC — HIGH (ref 9.9–13.4)
RBC # BLD: 4.21 M/UL — SIGNIFICANT CHANGE UP (ref 3.8–5.2)
RBC # FLD: 14.6 % — HIGH (ref 10.3–14.5)
SODIUM SERPL-SCNC: 145 MMOL/L — SIGNIFICANT CHANGE UP (ref 135–145)
WBC # BLD: 6.88 K/UL — SIGNIFICANT CHANGE UP (ref 3.8–10.5)
WBC # FLD AUTO: 6.88 K/UL — SIGNIFICANT CHANGE UP (ref 3.8–10.5)

## 2024-12-30 PROCEDURE — 99285 EMERGENCY DEPT VISIT HI MDM: CPT | Mod: FS

## 2024-12-30 PROCEDURE — 93010 ELECTROCARDIOGRAM REPORT: CPT

## 2024-12-30 PROCEDURE — 93970 EXTREMITY STUDY: CPT | Mod: 26

## 2024-12-30 PROCEDURE — 73562 X-RAY EXAM OF KNEE 3: CPT | Mod: 26,LT

## 2024-12-30 RX ORDER — SODIUM CHLORIDE 9 MG/ML
1000 INJECTION, SOLUTION INTRAMUSCULAR; INTRAVENOUS; SUBCUTANEOUS ONCE
Refills: 0 | Status: COMPLETED | OUTPATIENT
Start: 2024-12-30 | End: 2024-12-30

## 2024-12-30 RX ORDER — ACETAMINOPHEN 80 MG/.8ML
975 SOLUTION/ DROPS ORAL ONCE
Refills: 0 | Status: COMPLETED | OUTPATIENT
Start: 2024-12-30 | End: 2024-12-30

## 2024-12-30 RX ADMIN — SODIUM CHLORIDE 1000 MILLILITER(S): 9 INJECTION, SOLUTION INTRAMUSCULAR; INTRAVENOUS; SUBCUTANEOUS at 23:19

## 2024-12-30 RX ADMIN — ACETAMINOPHEN 975 MILLIGRAM(S): 80 SOLUTION/ DROPS ORAL at 23:19

## 2024-12-30 NOTE — ED PROVIDER NOTE - OBJECTIVE STATEMENT
63 y/o female with pmhx sarcoidosis, arthritis, gout, kidney dz presents to the ED for left knee pain x 3 days. She follows orthopedic who gives her cortisone injection in the knee. She had a blood clot which she is being treated with Eliquis 5 BID (treated by hematology). Pain began int he medial knee, then to the lateral, now in the posterior area. No redness over the area. No new trauma. Using the crutches to ambulate. 65 y/o female with pmhx sarcoidosis, arthritis, gout, kidney dz presents to the ED for left knee pain x 3 days. She follows orthopedic who gives her cortisone injection in the knee. She had a blood clot which she is still being treated with Eliquis 5 BID (treated by hematology). She declines any non-compliance with her eliquis. Pain began int he medial knee, then to the lateral, now in the posterior area. No redness over the area. No new trauma. Using the crutches to ambulate. Endorses mild CP. No n/v, no abd pain, no headache, no overlying skin changes to leg

## 2024-12-30 NOTE — ED ADULT NURSE NOTE - OBJECTIVE STATEMENT
Pt received A&Ox4 c/o L posterior knee pain since Friday. Pt endorses HX of DVTs. No obvious swelling present. Denies any SOB or chest pain. Respirations even & unlabored. NAD. Pt made aware of plan of care and verbalized understanding.

## 2024-12-30 NOTE — ED PROVIDER NOTE - NSFOLLOWUPINSTRUCTIONS_ED_ALL_ED_FT
KNEE PAIN     Please follow up with Orthopedist within 3 days   Please follow up with hematology and vascular within 5 days     PLEASE keep taking Eliquis as directed     Deep Vein Thrombosis    A deep vein thrombosis (DVT) is a blood clot (thrombus) that usually occurs in a deep, larger vein of the lower leg or the pelvis, or in an upper extremity such as the arm. These are dangerous and can lead to serious and even life-threatening complications if the clot travels to the lungs. Symptoms include swelling of the arm or leg, warmth and redness of the arm or leg, and pain. Treatment may include taking a blood thinning medication; make sure to take anything prescribed as instructed.    SEEK IMMEDIATE MEDICAL CARE IF YOU HAVE ANY OF THE FOLLOWING SYMPTOMS: shortness of breath, chest pain, rapid or irregular heartbeat, lightheadedness/dizziness, coughing up blood, or any bleeding in your vomit, stool, or urine. These symptoms may represent a serious problem that is an emergency. Do not wait to see if the symptoms will go away. Call 911 and do not drive yourself to the hospital.      CTA revealing Aortic calcifications. Coronary artery calcifications. No pulmonary embolus, Postinfectious, small, calcified, mediastinal/hilar lymph nodes, Right adrenal gland AML measuring up   to 2.4 cm in greatest dimension. Splenomegaly. No acute abnormality detected     Please follow up with Primary care provider within 1 week for incidental findings    Please follow up with your orthopedist within 1 week     MARSHAL NAVA  Realice un seguimiento con el ortopedista dentro de los 3 días Realice un seguimiento con hematología y vascular dentro de los 5 días  Siga tomando Eliquis según las indicaciones  Trombosis venosa profunda  Ana M trombosis venosa profunda (TVP) es un coágulo de yvonne (trombo) que generalmente se produce en ana m vena profunda y más maxwell de la parte inferior de la pierna o la pelvis, o en ana m extremidad superior jere el brazo. Estos son peligrosos y pueden provocar complicaciones graves e incluso mortales si el coágulo se desplaza a los pulmones. Los síntomas incluyen hinchazón del brazo o la pierna, calor y enrojecimiento del brazo o la pierna y dolor. El tratamiento puede incluir rafal un medicamento anticoagulante; asegúrese de rafal todo lo que le receten según las instrucciones.  BUSQUE ATENCIÓN MÉDICA INMEDIATA SI TIENE ALGUNO DE LOS SIGUIENTES SÍNTOMAS: dificultad para respirar, dolor en el pecho, latidos cardíacos rápidos o irregulares, mareos, tos con yvonne o cualquier sangrado en el vómito, las heces o la orina. Estos síntomas pueden representar un problema grave que es ana m emergencia. No espere a yovani si los síntomas desaparecen. Llame al 911 y no conduzca hasta el hospital.  Análisis por angiografía computarizada que revela calcificaciones aórticas. Calcificaciones de la arteria coronaria. No hay embolia pulmonar. Ganglios linfáticos mediastínicos/hiliares pequeños, calcificados y posinfecciosos. Leucemia mieloide aguda en la glándula suprarrenal derecha que mide hasta 2,4 cm en stewart mayor dimensión. Esplenomegalia. No se detectó ninguna anomalía aguda.  Realice un seguimiento con stewart médico de atención primaria dentro de 1 semana para obtener hallazgos incidentales.

## 2024-12-30 NOTE — ED PROVIDER NOTE - PATIENT PORTAL LINK FT
You can access the FollowMyHealth Patient Portal offered by [facility] by registering at the following website: [email or web address]. By joining [employer]’s FollowMyHealth portal, you will also be able to view your health information using other applications (apps) compatible with our system.

## 2024-12-30 NOTE — ED PROVIDER NOTE - ATTENDING APP SHARED VISIT CONTRIBUTION OF CARE
64-year-old female history of sarcoidosis, arthritis, gout, kidney disease, previous DVT on Eliquis presents with left knee pain x 3 days.  Patient also report mild chest pain with shortness of breath.  EKG shows sinus tachycardia.  D-dimer elevated at 335, duplex showed chronic posttraumatic changes.  CTA negative for PE.  Troponin 19, repeat 21.  X-ray of the knee showed chondromalacia with osteochondral defect with joint effusion.  This is  likely the cause of patient's pain.  IV fluid Tylenol given.  Recommend to follow-up with orthopedics outpatient.

## 2024-12-30 NOTE — ED PROVIDER NOTE - PHYSICAL EXAMINATION
Gen: No acute distress, non toxic female, speaking in full sentences   HEENT: NCAT, Mucous membranes moist, Oropharynx without exudates, uvula midline  Eyes: pink conjunctivae, EOMI, PERRL  CV: RRR, nl s1/s2 noted   Resp: CTAB, normal rate and effort  GI: Abdomen soft, NT, ND. No rebound, no guarding  Neuro: A&O x 3, sensorimotor intact without deficits   MSK: (+)  TTP to left posterior calf with full ROM. No overlying erythema/warmth, minimal effusion. Right LE unremarkable. Pulses intact. No spine or tenderness to palpation, Full ROM ext x 4  Skin: No rashes. intact and perfused

## 2024-12-30 NOTE — ED PROVIDER NOTE - CARE PROVIDER_API CALL
Jacky Luong  Vascular Surgery  [address]  [place] [address]  Phone: [phone]  Fax: [phone]  Follow Up Time: 4-6 Days

## 2024-12-30 NOTE — ED PROVIDER NOTE - CLINICAL SUMMARY MEDICAL DECISION MAKING FREE TEXT BOX
63 y/o female with pmhx sarcoidosis, arthritis, gout, kidney dz presents to the ED for left knee pain x 3 days. She follows orthopedic who gives her cortisone injection in the knee. She had a blood clot which she is still being treated with Eliquis 5 BID (treated by hematology). Upon exam, well appearing female with tender posterior left knee joint, full ROM, mild effusion, no overlying skin changes.   -Labs revealing no leukocytosis, mild anemia, mild anion gap > fluids given, calcium elevation  -Trop 21 to 19   -No LEFT calf vein thrombosis is detected, Redemonstrated nonobstructive DVT of the left popliteal and gastrocnemius veins, not significantly changed, consistent with chronic thrombotic changes. > recommend continuing Eliquis and follow up with vascular and her hematology   -D-dimer minor elevation > CTA revealing No pulmonary embolism. No acute cardiopulmonary disease detected  Tachycardic- ekg performed. CTA revealing Aortic calcifications. Coronary artery calcifications. No pulmonary embolus, Postinfectious, small, calcified, mediastinal/hilar lymph nodes, Right adrenal gland AML measuring up to 2.4 cm in greatest dimension. Splenomegaly. No acute abnormality detected   Care coordinator contacted for aid with appt. Recommend ortho f/u for further arthritic care of joint  Pt re-assessed at this time, feeling well. All results reviewed with pt, all questions answered. Pt provided copy of all results. Return precautions given. Stable for dc. Results given with  Devan at bedside 65 y/o female with pmhx sarcoidosis, arthritis, gout, kidney dz presents to the ED for left knee pain x 3 days. She follows orthopedic who gives her cortisone injection in the knee. She had a blood clot which she is still being treated with Eliquis 5 BID (treated by hematology). Upon exam, well appearing female with tender posterior left knee joint, full ROM, mild effusion, no overlying skin changes.   -Labs revealing no leukocytosis, mild anemia, mild anion gap > fluids given, calcium elevation  -Trop 21 to 19   -No LEFT calf vein thrombosis is detected, Redemonstrated nonobstructive DVT of the left popliteal and gastrocnemius veins, not significantly changed, consistent with chronic thrombotic changes. > recommend continuing Eliquis and follow up with vascular and her hematology   -D-dimer minor elevation > CTA revealing No pulmonary embolism. No acute cardiopulmonary disease detected  Tachycardic- ekg performed. CTA revealing Aortic calcifications. Coronary artery calcifications. No pulmonary embolus, Postinfectious, small, calcified, mediastinal/hilar lymph nodes, Right adrenal gland AML measuring up to 2.4 cm in greatest dimension. Splenomegaly. No acute abnormality detected   Care coordinator contacted for aid with appt. Recommend ortho f/u for further arthritic care of joint  Recommend to continue Eliquis   Pt re-assessed at this time, feeling well. All results reviewed with pt, all questions answered. Pt provided copy of all results. Return precautions given. Stable for dc. Results given with  Devan at bedside

## 2024-12-30 NOTE — ED ADULT NURSE NOTE - NS ED NURSE DISCH DISPOSITION
Phone call received from Brittny on 8/12/2021 stating that she was supposed to return to work next week but she was diagnosed with COVID-19 yesterday and will need to remain out of work until she is cleared by her PCP.  She is asking that we extend her work accommodations by one month since her return to work date is being pushed out due to the COVID.  Writer will work on a letter and provide to patient and fax to [employer].    Brittny states that she is planning on starting up with [employer] starting 9/14/2021, she is asking out office to write a letter expressing how important her attending these classes are for rebuilding her strength and endurance and for her not to be scheduled to work during the classes.  Writer will work on a letter.    No other needs noted at this time.    Discharged

## 2024-12-31 VITALS
TEMPERATURE: 98 F | SYSTOLIC BLOOD PRESSURE: 129 MMHG | HEART RATE: 86 BPM | RESPIRATION RATE: 19 BRPM | DIASTOLIC BLOOD PRESSURE: 77 MMHG | OXYGEN SATURATION: 98 %

## 2024-12-31 LAB
NT-PROBNP SERPL-SCNC: 195 PG/ML — SIGNIFICANT CHANGE UP (ref 0–300)
TROPONIN T, HIGH SENSITIVITY RESULT: 19 NG/L — SIGNIFICANT CHANGE UP (ref 0–51)
TROPONIN T, HIGH SENSITIVITY RESULT: 21 NG/L — SIGNIFICANT CHANGE UP (ref 0–51)

## 2024-12-31 PROCEDURE — 85730 THROMBOPLASTIN TIME PARTIAL: CPT

## 2024-12-31 PROCEDURE — T1013: CPT

## 2024-12-31 PROCEDURE — 85025 COMPLETE CBC W/AUTO DIFF WBC: CPT

## 2024-12-31 PROCEDURE — 71275 CT ANGIOGRAPHY CHEST: CPT | Mod: 26

## 2024-12-31 PROCEDURE — 73562 X-RAY EXAM OF KNEE 3: CPT

## 2024-12-31 PROCEDURE — 93970 EXTREMITY STUDY: CPT

## 2024-12-31 PROCEDURE — 36415 COLL VENOUS BLD VENIPUNCTURE: CPT

## 2024-12-31 PROCEDURE — 83880 ASSAY OF NATRIURETIC PEPTIDE: CPT

## 2024-12-31 PROCEDURE — 71275 CT ANGIOGRAPHY CHEST: CPT | Mod: MC

## 2024-12-31 PROCEDURE — 84484 ASSAY OF TROPONIN QUANT: CPT

## 2024-12-31 PROCEDURE — 80053 COMPREHEN METABOLIC PANEL: CPT

## 2024-12-31 PROCEDURE — 85610 PROTHROMBIN TIME: CPT

## 2024-12-31 PROCEDURE — 99285 EMERGENCY DEPT VISIT HI MDM: CPT | Mod: 25

## 2024-12-31 PROCEDURE — 85379 FIBRIN DEGRADATION QUANT: CPT

## 2024-12-31 PROCEDURE — 93005 ELECTROCARDIOGRAM TRACING: CPT

## 2025-01-02 ENCOUNTER — APPOINTMENT (OUTPATIENT)
Dept: ORTHOPEDIC SURGERY | Facility: CLINIC | Age: 65
End: 2025-01-02
Payer: MEDICARE

## 2025-01-02 VITALS
HEIGHT: 61.5 IN | SYSTOLIC BLOOD PRESSURE: 124 MMHG | WEIGHT: 167 LBS | DIASTOLIC BLOOD PRESSURE: 77 MMHG | HEART RATE: 103 BPM | BODY MASS INDEX: 31.13 KG/M2

## 2025-01-02 DIAGNOSIS — M17.12 UNILATERAL PRIMARY OSTEOARTHRITIS, LEFT KNEE: ICD-10-CM

## 2025-01-02 PROCEDURE — G2211 COMPLEX E/M VISIT ADD ON: CPT

## 2025-01-02 PROCEDURE — 73564 X-RAY EXAM KNEE 4 OR MORE: CPT | Mod: 26,LT

## 2025-01-02 PROCEDURE — 99213 OFFICE O/P EST LOW 20 MIN: CPT

## 2025-01-06 ENCOUNTER — APPOINTMENT (OUTPATIENT)
Dept: UROLOGY | Facility: CLINIC | Age: 65
End: 2025-01-06

## 2025-01-08 ENCOUNTER — APPOINTMENT (OUTPATIENT)
Dept: VASCULAR SURGERY | Facility: CLINIC | Age: 65
End: 2025-01-08
Payer: MEDICARE

## 2025-01-08 VITALS
WEIGHT: 158 LBS | RESPIRATION RATE: 16 BRPM | HEART RATE: 107 BPM | OXYGEN SATURATION: 98 % | HEIGHT: 61 IN | DIASTOLIC BLOOD PRESSURE: 74 MMHG | BODY MASS INDEX: 29.83 KG/M2 | TEMPERATURE: 97.8 F | SYSTOLIC BLOOD PRESSURE: 130 MMHG

## 2025-01-08 DIAGNOSIS — I82.409 ACUTE EMBOLISM AND THROMBOSIS OF UNSPECIFIED DEEP VEINS OF UNSPECIFIED LOWER EXTREMITY: ICD-10-CM

## 2025-01-08 DIAGNOSIS — I82.532 CHRONIC EMBOLISM AND THROMBOSIS OF LEFT POPLITEAL VEIN: ICD-10-CM

## 2025-01-08 PROCEDURE — 99203 OFFICE O/P NEW LOW 30 MIN: CPT

## 2025-01-23 ENCOUNTER — APPOINTMENT (OUTPATIENT)
Dept: ORTHOPEDIC SURGERY | Facility: CLINIC | Age: 65
End: 2025-01-23
Payer: MEDICARE

## 2025-01-23 VITALS — DIASTOLIC BLOOD PRESSURE: 83 MMHG | SYSTOLIC BLOOD PRESSURE: 124 MMHG | HEART RATE: 88 BPM

## 2025-01-23 VITALS
BODY MASS INDEX: 29.83 KG/M2 | WEIGHT: 158 LBS | SYSTOLIC BLOOD PRESSURE: 159 MMHG | HEART RATE: 125 BPM | HEIGHT: 61 IN | DIASTOLIC BLOOD PRESSURE: 90 MMHG

## 2025-01-23 DIAGNOSIS — M17.12 UNILATERAL PRIMARY OSTEOARTHRITIS, LEFT KNEE: ICD-10-CM

## 2025-01-23 PROCEDURE — 99213 OFFICE O/P EST LOW 20 MIN: CPT

## 2025-01-23 PROCEDURE — G2211 COMPLEX E/M VISIT ADD ON: CPT

## 2025-01-23 RX ORDER — HYALURONATE SODIUM 20 MG/2 ML
20 SYRINGE (ML) INTRAARTICULAR
Qty: 1 | Refills: 0 | Status: ACTIVE | OUTPATIENT
Start: 2025-01-23

## 2025-01-31 RX ORDER — TRAMADOL HYDROCHLORIDE 50 MG/1
50 TABLET, COATED ORAL EVERY 8 HOURS
Qty: 28 | Refills: 0 | Status: ACTIVE | COMMUNITY
Start: 2025-01-31 | End: 1900-01-01

## 2025-02-21 ENCOUNTER — APPOINTMENT (OUTPATIENT)
Dept: MAMMOGRAPHY | Facility: CLINIC | Age: 65
End: 2025-02-21
Payer: MEDICARE

## 2025-02-21 ENCOUNTER — OUTPATIENT (OUTPATIENT)
Dept: OUTPATIENT SERVICES | Facility: HOSPITAL | Age: 65
LOS: 1 days | End: 2025-02-21
Payer: MEDICARE

## 2025-02-21 DIAGNOSIS — Z12.39 ENCOUNTER FOR OTHER SCREENING FOR MALIGNANT NEOPLASM OF BREAST: ICD-10-CM

## 2025-02-21 DIAGNOSIS — Z00.8 ENCOUNTER FOR OTHER GENERAL EXAMINATION: ICD-10-CM

## 2025-02-21 PROCEDURE — 77063 BREAST TOMOSYNTHESIS BI: CPT | Mod: 26

## 2025-02-21 PROCEDURE — 77067 SCR MAMMO BI INCL CAD: CPT | Mod: 26

## 2025-02-21 PROCEDURE — 77063 BREAST TOMOSYNTHESIS BI: CPT

## 2025-02-21 PROCEDURE — 77067 SCR MAMMO BI INCL CAD: CPT

## 2025-03-02 ENCOUNTER — NON-APPOINTMENT (OUTPATIENT)
Age: 65
End: 2025-03-02

## 2025-03-11 NOTE — ED ADULT NURSE NOTE - NSFALLRISKASMTTYPE_ED_ALL_ED
Reina Quan  You14 hours ago (8:13 PM)   Hi,  I started on 10/11/24 @ the [facility]  
Initial (On Arrival)

## 2025-03-12 ENCOUNTER — NON-APPOINTMENT (OUTPATIENT)
Age: 65
End: 2025-03-12

## 2025-03-12 ENCOUNTER — APPOINTMENT (OUTPATIENT)
Dept: RHEUMATOLOGY | Facility: CLINIC | Age: 65
End: 2025-03-12
Payer: MEDICARE

## 2025-03-12 VITALS
HEART RATE: 87 BPM | SYSTOLIC BLOOD PRESSURE: 120 MMHG | DIASTOLIC BLOOD PRESSURE: 70 MMHG | TEMPERATURE: 98.1 F | OXYGEN SATURATION: 97 % | HEIGHT: 61 IN

## 2025-03-12 DIAGNOSIS — N18.30 CHRONIC KIDNEY DISEASE, STAGE 3 UNSPECIFIED: ICD-10-CM

## 2025-03-12 DIAGNOSIS — D86.9 SARCOIDOSIS, UNSPECIFIED: ICD-10-CM

## 2025-03-12 DIAGNOSIS — E79.0 HYPERURICEMIA W/OUT SIGNS OF INFLAMMATORY ARTHRITIS AND TOPHACEOUS DISEASE: ICD-10-CM

## 2025-03-12 DIAGNOSIS — M25.50 PAIN IN UNSPECIFIED JOINT: ICD-10-CM

## 2025-03-12 DIAGNOSIS — Z79.899 OTHER LONG TERM (CURRENT) DRUG THERAPY: ICD-10-CM

## 2025-03-12 DIAGNOSIS — M10.9 GOUT, UNSPECIFIED: ICD-10-CM

## 2025-03-12 PROCEDURE — 99214 OFFICE O/P EST MOD 30 MIN: CPT

## 2025-03-12 PROCEDURE — G2211 COMPLEX E/M VISIT ADD ON: CPT

## 2025-03-14 ENCOUNTER — RESULT REVIEW (OUTPATIENT)
Age: 65
End: 2025-03-14

## 2025-03-15 ENCOUNTER — TRANSCRIPTION ENCOUNTER (OUTPATIENT)
Age: 65
End: 2025-03-15

## 2025-03-17 ENCOUNTER — TRANSCRIPTION ENCOUNTER (OUTPATIENT)
Age: 65
End: 2025-03-17

## 2025-04-15 ENCOUNTER — OFFICE (OUTPATIENT)
Dept: URBAN - METROPOLITAN AREA CLINIC 88 | Facility: CLINIC | Age: 65
Setting detail: OPHTHALMOLOGY
End: 2025-04-15
Payer: MEDICARE

## 2025-04-15 DIAGNOSIS — H43.12: ICD-10-CM

## 2025-04-15 DIAGNOSIS — H31.092: ICD-10-CM

## 2025-04-15 DIAGNOSIS — E11.9: ICD-10-CM

## 2025-04-15 DIAGNOSIS — H43.813: ICD-10-CM

## 2025-04-15 PROCEDURE — 92134 CPTRZ OPH DX IMG PST SGM RTA: CPT | Performed by: OPHTHALMOLOGY

## 2025-04-15 PROCEDURE — 92014 COMPRE OPH EXAM EST PT 1/>: CPT | Performed by: OPHTHALMOLOGY

## 2025-04-15 ASSESSMENT — PACHYMETRY
OS_CT_UM: 540
OS_CT_CORRECTION: 0
OD_CT_CORRECTION: 0
OD_CT_UM: 540

## 2025-04-15 ASSESSMENT — TONOMETRY
OS_IOP_MMHG: 15
OD_IOP_MMHG: 17

## 2025-04-15 ASSESSMENT — REFRACTION_AUTOREFRACTION
OD_SPHERE: +2.50
OS_SPHERE: +2.50
OS_AXIS: 077
OD_AXIS: 109
OD_CYLINDER: -0.75
OS_CYLINDER: -0.75

## 2025-04-15 ASSESSMENT — KERATOMETRY
OS_K1POWER_DIOPTERS: 43.00
OD_K1POWER_DIOPTERS: 43.25
OD_K2POWER_DIOPTERS: 44.00
OS_AXISANGLE_DEGREES: 127
OD_AXISANGLE_DEGREES: 047
OS_K2POWER_DIOPTERS: 43.50

## 2025-04-15 ASSESSMENT — CONFRONTATIONAL VISUAL FIELD TEST (CVF)
OS_FINDINGS: FULL
OD_FINDINGS: FULL

## 2025-04-15 ASSESSMENT — VISUAL ACUITY
OD_BCVA: 20/30
OS_BCVA: 20/25-

## 2025-04-15 ASSESSMENT — SUPERFICIAL PUNCTATE KERATITIS (SPK)
OD_SPK: T
OS_SPK: T

## 2025-04-28 ENCOUNTER — OFFICE (OUTPATIENT)
Dept: URBAN - METROPOLITAN AREA CLINIC 100 | Facility: CLINIC | Age: 65
Setting detail: OPHTHALMOLOGY
End: 2025-04-28
Payer: MEDICARE

## 2025-04-28 DIAGNOSIS — H16.223: ICD-10-CM

## 2025-04-28 DIAGNOSIS — H43.813: ICD-10-CM

## 2025-04-28 DIAGNOSIS — H40.013: ICD-10-CM

## 2025-04-28 DIAGNOSIS — H25.13: ICD-10-CM

## 2025-04-28 DIAGNOSIS — H31.092: ICD-10-CM

## 2025-04-28 DIAGNOSIS — E11.9: ICD-10-CM

## 2025-04-28 PROCEDURE — 92250 FUNDUS PHOTOGRAPHY W/I&R: CPT | Performed by: OPHTHALMOLOGY

## 2025-04-28 PROCEDURE — 92083 EXTENDED VISUAL FIELD XM: CPT | Performed by: OPHTHALMOLOGY

## 2025-04-28 PROCEDURE — 92014 COMPRE OPH EXAM EST PT 1/>: CPT | Performed by: OPHTHALMOLOGY

## 2025-04-28 ASSESSMENT — VISUAL ACUITY
OD_BCVA: 20/30+2
OS_BCVA: 20/25-2

## 2025-04-28 ASSESSMENT — REFRACTION_AUTOREFRACTION
OS_SPHERE: +3.00
OD_AXIS: 102
OS_AXIS: 068
OD_SPHERE: +3.00
OS_CYLINDER: -0.75
OD_CYLINDER: -1.00

## 2025-04-28 ASSESSMENT — KERATOMETRY
OD_K2POWER_DIOPTERS: 43.75
OD_K1POWER_DIOPTERS: 43.00
OS_K2POWER_DIOPTERS: 43.75
OS_AXISANGLE_DEGREES: 125
OD_AXISANGLE_DEGREES: 033
METHOD_AUTO_MANUAL: AUTO
OS_K1POWER_DIOPTERS: 43.00

## 2025-04-28 ASSESSMENT — SUPERFICIAL PUNCTATE KERATITIS (SPK)
OS_SPK: T
OD_SPK: T

## 2025-04-30 ENCOUNTER — OUTPATIENT (OUTPATIENT)
Dept: OUTPATIENT SERVICES | Facility: HOSPITAL | Age: 65
LOS: 1 days | End: 2025-04-30
Payer: MEDICARE

## 2025-04-30 ENCOUNTER — APPOINTMENT (OUTPATIENT)
Dept: CT IMAGING | Facility: CLINIC | Age: 65
End: 2025-04-30
Payer: MEDICARE

## 2025-04-30 DIAGNOSIS — Z00.8 ENCOUNTER FOR OTHER GENERAL EXAMINATION: ICD-10-CM

## 2025-04-30 DIAGNOSIS — N20.0 CALCULUS OF KIDNEY: ICD-10-CM

## 2025-04-30 PROCEDURE — 74176 CT ABD & PELVIS W/O CONTRAST: CPT

## 2025-04-30 PROCEDURE — 74176 CT ABD & PELVIS W/O CONTRAST: CPT | Mod: 26

## 2025-05-05 ENCOUNTER — APPOINTMENT (OUTPATIENT)
Dept: UROLOGY | Facility: CLINIC | Age: 65
End: 2025-05-05

## 2025-05-05 VITALS
OXYGEN SATURATION: 99 % | WEIGHT: 161 LBS | DIASTOLIC BLOOD PRESSURE: 77 MMHG | SYSTOLIC BLOOD PRESSURE: 124 MMHG | BODY MASS INDEX: 29.63 KG/M2 | HEART RATE: 61 BPM | HEIGHT: 62 IN

## 2025-05-05 DIAGNOSIS — N20.0 CALCULUS OF KIDNEY: ICD-10-CM

## 2025-05-05 DIAGNOSIS — E83.50 UNSPECIFIED DISORDER OF CALCIUM METABOLISM: ICD-10-CM

## 2025-05-05 PROCEDURE — 99213 OFFICE O/P EST LOW 20 MIN: CPT

## 2025-05-15 PROBLEM — E83.50 CALCIUM METABOLISM DISORDER: Status: ACTIVE | Noted: 2025-05-15

## 2025-06-06 NOTE — ED ADULT TRIAGE NOTE - HEIGHT IN FEET
5 [FreeTextEntry1] : Ms. ИВАН SHI is a 49 year old female with past medical history of BRCA 1 positive, depression, HLD who presents with newly diagnosed left breast cancer. She is now s/p b/l mastectomy by Dr. Guerrero and PAP flap reconstruction 1/9/25 with Dr. Michael Rodriguez saw patient on 2/12/25 and debrided her right breast mastectomy eschar, She then saw Dr. Guerrero on where she had some additional tissue debrided and was found to have exposed alloderm. She presented to the office on 3/4/35 for removal of exposed Alloderm. She was found to have Pseudomonas and has since completed a course of Cipro with resolution of her infection She started Immunotherapy 3/7/25 and should continue until June She completed radiation on 4/3/25 She presents today for wound check and to discuss revision procedure.

## 2025-06-18 ENCOUNTER — APPOINTMENT (OUTPATIENT)
Dept: RHEUMATOLOGY | Facility: CLINIC | Age: 65
End: 2025-06-18
Payer: MEDICARE

## 2025-06-18 VITALS
SYSTOLIC BLOOD PRESSURE: 128 MMHG | DIASTOLIC BLOOD PRESSURE: 76 MMHG | OXYGEN SATURATION: 98 % | HEIGHT: 62 IN | HEART RATE: 100 BPM | TEMPERATURE: 97.9 F

## 2025-06-18 PROBLEM — N18.2 STAGE 2 CHRONIC KIDNEY DISEASE: Status: RESOLVED | Noted: 2023-07-07 | Resolved: 2025-06-18

## 2025-06-18 PROBLEM — N18.31 STAGE 3A CHRONIC KIDNEY DISEASE: Status: ACTIVE | Noted: 2025-06-18

## 2025-06-18 PROBLEM — M71.121 SEPTIC OLECRANON BURSITIS OF RIGHT ELBOW: Status: RESOLVED | Noted: 2021-08-31 | Resolved: 2025-06-18

## 2025-06-18 PROBLEM — M65.931 SYNOVITIS OF RIGHT WRIST: Status: RESOLVED | Noted: 2024-08-14 | Resolved: 2025-06-18

## 2025-06-18 PROBLEM — M65.931 TENOSYNOVITIS OF RIGHT WRIST: Status: RESOLVED | Noted: 2024-08-14 | Resolved: 2025-06-18

## 2025-06-18 PROCEDURE — G2211 COMPLEX E/M VISIT ADD ON: CPT

## 2025-06-18 PROCEDURE — 99214 OFFICE O/P EST MOD 30 MIN: CPT

## 2025-06-19 ENCOUNTER — APPOINTMENT (OUTPATIENT)
Dept: ENDOCRINOLOGY | Facility: CLINIC | Age: 65
End: 2025-06-19
Payer: MEDICARE

## 2025-06-19 ENCOUNTER — APPOINTMENT (OUTPATIENT)
Dept: NEPHROLOGY | Facility: CLINIC | Age: 65
End: 2025-06-19
Payer: MEDICARE

## 2025-06-19 VITALS
HEART RATE: 90 BPM | DIASTOLIC BLOOD PRESSURE: 74 MMHG | WEIGHT: 161 LBS | SYSTOLIC BLOOD PRESSURE: 120 MMHG | BODY MASS INDEX: 29.45 KG/M2 | OXYGEN SATURATION: 99 %

## 2025-06-19 VITALS
OXYGEN SATURATION: 97 % | WEIGHT: 161 LBS | BODY MASS INDEX: 29.63 KG/M2 | HEART RATE: 81 BPM | HEIGHT: 62 IN | DIASTOLIC BLOOD PRESSURE: 71 MMHG | SYSTOLIC BLOOD PRESSURE: 124 MMHG

## 2025-06-19 PROBLEM — D35.00 ADRENAL ADENOMA: Status: ACTIVE | Noted: 2025-06-19

## 2025-06-19 PROCEDURE — G2211 COMPLEX E/M VISIT ADD ON: CPT

## 2025-06-19 PROCEDURE — 99214 OFFICE O/P EST MOD 30 MIN: CPT

## 2025-06-19 PROCEDURE — 99204 OFFICE O/P NEW MOD 45 MIN: CPT

## 2025-06-19 RX ORDER — ENALAPRIL MALEATE 10 MG/1
10 TABLET ORAL
Refills: 0 | Status: ACTIVE | COMMUNITY

## 2025-06-19 RX ORDER — PREDNISONE 2.5 MG/1
2.5 TABLET ORAL DAILY
Qty: 90 | Refills: 0 | Status: ACTIVE | COMMUNITY
Start: 2025-06-19 | End: 1900-01-01

## 2025-06-19 RX ORDER — PREDNISONE 2.5 MG/1
2.5 TABLET ORAL
Refills: 0 | Status: ACTIVE | COMMUNITY

## 2025-07-28 ENCOUNTER — APPOINTMENT (OUTPATIENT)
Dept: PULMONOLOGY | Facility: CLINIC | Age: 65
End: 2025-07-28
Payer: MEDICARE

## 2025-07-28 ENCOUNTER — APPOINTMENT (OUTPATIENT)
Dept: PULMONOLOGY | Facility: CLINIC | Age: 65
End: 2025-07-28

## 2025-07-28 VITALS
HEART RATE: 73 BPM | RESPIRATION RATE: 16 BRPM | SYSTOLIC BLOOD PRESSURE: 112 MMHG | DIASTOLIC BLOOD PRESSURE: 70 MMHG | OXYGEN SATURATION: 98 %

## 2025-07-28 VITALS — WEIGHT: 157 LBS | HEIGHT: 61.5 IN | BODY MASS INDEX: 29.26 KG/M2

## 2025-07-28 DIAGNOSIS — Z86.16 PERSONAL HISTORY OF COVID-19: ICD-10-CM

## 2025-07-28 DIAGNOSIS — R91.8 OTHER NONSPECIFIC ABNORMAL FINDING OF LUNG FIELD: ICD-10-CM

## 2025-07-28 DIAGNOSIS — R59.1 GENERALIZED ENLARGED LYMPH NODES: ICD-10-CM

## 2025-07-28 DIAGNOSIS — R09.82 POSTNASAL DRIP: ICD-10-CM

## 2025-07-28 DIAGNOSIS — R06.02 SHORTNESS OF BREATH: ICD-10-CM

## 2025-07-28 DIAGNOSIS — E66.9 OBESITY, UNSPECIFIED: ICD-10-CM

## 2025-07-28 DIAGNOSIS — D86.9 SARCOIDOSIS, UNSPECIFIED: ICD-10-CM

## 2025-07-28 DIAGNOSIS — G47.33 OBSTRUCTIVE SLEEP APNEA (ADULT) (PEDIATRIC): ICD-10-CM

## 2025-07-28 PROCEDURE — 94729 DIFFUSING CAPACITY: CPT

## 2025-07-28 PROCEDURE — 99204 OFFICE O/P NEW MOD 45 MIN: CPT | Mod: 25

## 2025-07-28 PROCEDURE — 94727 GAS DIL/WSHOT DETER LNG VOL: CPT

## 2025-07-28 PROCEDURE — 94010 BREATHING CAPACITY TEST: CPT

## 2025-07-28 PROCEDURE — 85018 HEMOGLOBIN: CPT | Mod: QW

## 2025-07-28 RX ORDER — ENALAPRIL MALEATE 10 MG/1
10 TABLET ORAL
Refills: 0 | Status: ACTIVE | COMMUNITY

## (undated) DEVICE — DRSG CURITY GAUZE SPONGE 4 X 4" 12-PLY NON-STERILE

## (undated) DEVICE — CATH IV SAFE BC 22G X 1" (BLUE)

## (undated) DEVICE — GOWN IMPERV XL

## (undated) DEVICE — SOL BAG NS 0.9% 1000ML

## (undated) DEVICE — SYR LUER SLIP TIP 50CC

## (undated) DEVICE — SOL IRR BAG H2O 1000ML

## (undated) DEVICE — SENSOR O2 FINGER ADULT

## (undated) DEVICE — DRSG 2X2

## (undated) DEVICE — STERIS DEFENDO 3-PIECE KIT (AIR/WATER, SUCTION & BIOPSY VALVES)

## (undated) DEVICE — SYR SLIP 10CC

## (undated) DEVICE — FORCEP RADIAL JAW 4 W NDL 2.4MM 2.8MM 240CM ORANGE DISP

## (undated) DEVICE — TUBING ALARIS PUMP MODULE NON-DEHP

## (undated) DEVICE — PACK IV START WITH CHG

## (undated) DEVICE — MASK PROC EAR LOOP

## (undated) DEVICE — UNDERPAD LINEN SAVER 23 X 36"

## (undated) DEVICE — SYR IV FLUSH SALINE 10ML 30/TY

## (undated) DEVICE — TUBING IV EXTENSION MACRO W CLAVE 7"

## (undated) DEVICE — DENTURE CUP PINK